# Patient Record
Sex: MALE | Race: WHITE | NOT HISPANIC OR LATINO | Employment: FULL TIME | URBAN - METROPOLITAN AREA
[De-identification: names, ages, dates, MRNs, and addresses within clinical notes are randomized per-mention and may not be internally consistent; named-entity substitution may affect disease eponyms.]

---

## 2019-08-23 ENCOUNTER — TRANSCRIBE ORDERS (OUTPATIENT)
Dept: ADMINISTRATIVE | Facility: HOSPITAL | Age: 39
End: 2019-08-23

## 2019-08-23 ENCOUNTER — APPOINTMENT (OUTPATIENT)
Dept: LAB | Facility: CLINIC | Age: 39
End: 2019-08-23

## 2019-08-23 DIAGNOSIS — Z00.8 HEALTH EXAMINATION IN POPULATION SURVEY: Primary | ICD-10-CM

## 2019-08-23 DIAGNOSIS — Z00.8 HEALTH EXAMINATION IN POPULATION SURVEY: ICD-10-CM

## 2019-08-23 LAB
CHOLEST SERPL-MCNC: 194 MG/DL (ref 50–200)
EST. AVERAGE GLUCOSE BLD GHB EST-MCNC: 120 MG/DL
HBA1C MFR BLD: 5.8 % (ref 4.2–6.3)
HDLC SERPL-MCNC: 41 MG/DL (ref 40–60)
LDLC SERPL CALC-MCNC: 107 MG/DL (ref 0–100)
NONHDLC SERPL-MCNC: 153 MG/DL
TRIGL SERPL-MCNC: 229 MG/DL

## 2019-08-23 PROCEDURE — 83036 HEMOGLOBIN GLYCOSYLATED A1C: CPT

## 2019-08-23 PROCEDURE — 80061 LIPID PANEL: CPT

## 2019-08-23 PROCEDURE — 36415 COLL VENOUS BLD VENIPUNCTURE: CPT

## 2020-06-17 ENCOUNTER — OFFICE VISIT (OUTPATIENT)
Dept: GASTROENTEROLOGY | Facility: MEDICAL CENTER | Age: 40
End: 2020-06-17
Payer: COMMERCIAL

## 2020-06-17 VITALS
HEART RATE: 80 BPM | DIASTOLIC BLOOD PRESSURE: 116 MMHG | SYSTOLIC BLOOD PRESSURE: 151 MMHG | HEIGHT: 74 IN | BODY MASS INDEX: 33.62 KG/M2 | WEIGHT: 262 LBS | TEMPERATURE: 97.9 F

## 2020-06-17 DIAGNOSIS — R13.19 ESOPHAGEAL DYSPHAGIA: ICD-10-CM

## 2020-06-17 DIAGNOSIS — Z11.59 SPECIAL SCREENING EXAMINATION FOR UNSPECIFIED VIRAL DISEASE: Primary | ICD-10-CM

## 2020-06-17 PROCEDURE — 99203 OFFICE O/P NEW LOW 30 MIN: CPT | Performed by: INTERNAL MEDICINE

## 2020-06-23 DIAGNOSIS — Z11.59 SPECIAL SCREENING EXAMINATION FOR UNSPECIFIED VIRAL DISEASE: ICD-10-CM

## 2020-06-23 PROCEDURE — U0003 INFECTIOUS AGENT DETECTION BY NUCLEIC ACID (DNA OR RNA); SEVERE ACUTE RESPIRATORY SYNDROME CORONAVIRUS 2 (SARS-COV-2) (CORONAVIRUS DISEASE [COVID-19]), AMPLIFIED PROBE TECHNIQUE, MAKING USE OF HIGH THROUGHPUT TECHNOLOGIES AS DESCRIBED BY CMS-2020-01-R: HCPCS

## 2020-06-24 LAB — SARS-COV-2 RNA SPEC QL NAA+PROBE: NOT DETECTED

## 2020-06-26 ENCOUNTER — OFFICE VISIT (OUTPATIENT)
Dept: FAMILY MEDICINE CLINIC | Facility: CLINIC | Age: 40
End: 2020-06-26
Payer: COMMERCIAL

## 2020-06-26 VITALS
SYSTOLIC BLOOD PRESSURE: 132 MMHG | BODY MASS INDEX: 35.08 KG/M2 | RESPIRATION RATE: 17 BRPM | DIASTOLIC BLOOD PRESSURE: 90 MMHG | OXYGEN SATURATION: 97 % | WEIGHT: 259 LBS | TEMPERATURE: 97.4 F | HEIGHT: 72 IN | HEART RATE: 94 BPM

## 2020-06-26 DIAGNOSIS — R13.19 ESOPHAGEAL DYSPHAGIA: ICD-10-CM

## 2020-06-26 DIAGNOSIS — E66.9 OBESITY (BMI 30-39.9): ICD-10-CM

## 2020-06-26 DIAGNOSIS — E78.1 HYPERTRIGLYCERIDEMIA: ICD-10-CM

## 2020-06-26 DIAGNOSIS — Z00.00 HEALTH CARE MAINTENANCE: ICD-10-CM

## 2020-06-26 DIAGNOSIS — R73.03 PREDIABETES: Primary | ICD-10-CM

## 2020-06-26 PROCEDURE — 99395 PREV VISIT EST AGE 18-39: CPT | Performed by: FAMILY MEDICINE

## 2020-06-26 PROCEDURE — 3008F BODY MASS INDEX DOCD: CPT | Performed by: INTERNAL MEDICINE

## 2020-06-29 ENCOUNTER — ANESTHESIA EVENT (OUTPATIENT)
Dept: GASTROENTEROLOGY | Facility: MEDICAL CENTER | Age: 40
End: 2020-06-29

## 2020-06-29 RX ORDER — ONDANSETRON 2 MG/ML
4 INJECTION INTRAMUSCULAR; INTRAVENOUS EVERY 6 HOURS PRN
Status: CANCELLED | OUTPATIENT
Start: 2020-06-29

## 2020-06-30 ENCOUNTER — HOSPITAL ENCOUNTER (OUTPATIENT)
Dept: GASTROENTEROLOGY | Facility: MEDICAL CENTER | Age: 40
Setting detail: OUTPATIENT SURGERY
Discharge: HOME | End: 2020-06-30
Attending: INTERNAL MEDICINE | Admitting: INTERNAL MEDICINE
Payer: COMMERCIAL

## 2020-06-30 ENCOUNTER — ANESTHESIA (OUTPATIENT)
Dept: GASTROENTEROLOGY | Facility: MEDICAL CENTER | Age: 40
End: 2020-06-30

## 2020-06-30 VITALS
TEMPERATURE: 97.7 F | HEART RATE: 82 BPM | RESPIRATION RATE: 15 BRPM | OXYGEN SATURATION: 96 % | WEIGHT: 255 LBS | HEIGHT: 72 IN | SYSTOLIC BLOOD PRESSURE: 143 MMHG | DIASTOLIC BLOOD PRESSURE: 96 MMHG | BODY MASS INDEX: 34.54 KG/M2

## 2020-06-30 DIAGNOSIS — R13.19 ESOPHAGEAL DYSPHAGIA: ICD-10-CM

## 2020-06-30 PROCEDURE — C1726 CATH, BAL DIL, NON-VASCULAR: HCPCS

## 2020-06-30 PROCEDURE — 88305 TISSUE EXAM BY PATHOLOGIST: CPT | Performed by: PATHOLOGY

## 2020-06-30 PROCEDURE — 43239 EGD BIOPSY SINGLE/MULTIPLE: CPT | Performed by: INTERNAL MEDICINE

## 2020-06-30 PROCEDURE — 43249 ESOPH EGD DILATION <30 MM: CPT | Performed by: INTERNAL MEDICINE

## 2020-06-30 RX ORDER — SODIUM CHLORIDE 9 MG/ML
125 INJECTION, SOLUTION INTRAVENOUS CONTINUOUS
Status: CANCELLED | OUTPATIENT
Start: 2020-06-30

## 2020-06-30 RX ORDER — LIDOCAINE HYDROCHLORIDE 20 MG/ML
INJECTION, SOLUTION EPIDURAL; INFILTRATION; INTRACAUDAL; PERINEURAL AS NEEDED
Status: DISCONTINUED | OUTPATIENT
Start: 2020-06-30 | End: 2020-06-30 | Stop reason: SURG

## 2020-06-30 RX ORDER — SODIUM CHLORIDE 9 MG/ML
125 INJECTION, SOLUTION INTRAVENOUS CONTINUOUS
Status: DISCONTINUED | OUTPATIENT
Start: 2020-06-30 | End: 2020-06-30

## 2020-06-30 RX ORDER — PROPOFOL 10 MG/ML
INJECTION, EMULSION INTRAVENOUS AS NEEDED
Status: DISCONTINUED | OUTPATIENT
Start: 2020-06-30 | End: 2020-06-30 | Stop reason: SURG

## 2020-06-30 RX ORDER — PROPOFOL 10 MG/ML
INJECTION, EMULSION INTRAVENOUS CONTINUOUS PRN
Status: DISCONTINUED | OUTPATIENT
Start: 2020-06-30 | End: 2020-06-30 | Stop reason: SURG

## 2020-06-30 RX ADMIN — PROPOFOL 150 MG: 10 INJECTION, EMULSION INTRAVENOUS at 07:23

## 2020-06-30 RX ADMIN — SODIUM CHLORIDE 125 ML/HR: 0.9 INJECTION, SOLUTION INTRAVENOUS at 07:04

## 2020-06-30 RX ADMIN — PROPOFOL 180 MCG/KG/MIN: 10 INJECTION, EMULSION INTRAVENOUS at 07:23

## 2020-06-30 RX ADMIN — LIDOCAINE HYDROCHLORIDE 5 ML: 20 INJECTION, SOLUTION EPIDURAL; INFILTRATION; INTRACAUDAL at 07:23

## 2020-06-30 NOTE — DISCHARGE INSTRUCTIONS
Upper Endoscopy   WHAT YOU NEED TO KNOW:   An upper endoscopy is also called an upper gastrointestinal (GI) endoscopy, or an esophagogastroduodenoscopy (EGD)  You may feel bloated, gassy, or have some abdominal discomfort after your procedure  Your throat may be sore for 24 to 36 hours  You may burp or pass gas from air that is still inside your body  DISCHARGE INSTRUCTIONS:   Call 911 if:   · You have sudden chest pain or trouble breathing  Seek care immediately if:   · You feel dizzy or faint  · You have trouble swallowing  · You have severe throat pain  · Your bowel movements are very dark or black  · Your abdomen is hard and firm and you have severe pain  · You vomit blood  Contact your healthcare provider if:   · You feel full or bloated and cannot burp or pass gas  · You have not had a bowel movement for 3 days after your procedure  · You have neck pain  · You have a fever or chills  · You have nausea or are vomiting  · You have a rash or hives  · You have questions or concerns about your endoscopy  Relieve a sore throat:  Suck on throat lozenges or crushed ice  Gargle with a small amount of warm salt water  Mix 1 teaspoon of salt and 1 cup of warm water to make salt water  Relieve gas and discomfort from bloating:  Lie on your right side with a heating pad on your abdomen  Take short walks to help pass gas  Eat small meals until bloating is relieved  Rest after your procedure:  Do not drive or make important decisions until the day after your procedure  Return to your normal activity as directed  You can usually return to work the day after your procedure  Follow up with your healthcare provider as directed:  Write down your questions so you remember to ask them during your visits  © 2017 [employer]  Information is for End User's use only and may not be sold, redistributed or otherwise used for commercial purposes   All illustrations and images included in nodila 605 are the copyrighted property of [employer]  or Mike Lancaster  The above information is an  only  It is not intended as medical advice for individual conditions or treatments  Talk to your doctor, nurse or pharmacist before following any medical regimen to see if it is safe and effective for you  Esophageal Dilation   WHAT YOU NEED TO KNOW:   Esophageal dilation is a procedure to widen a narrow part of your esophagus  Your healthcare provider will use a dilator (inflatable balloon or another tool that expands) to make the area wider  He may also do an endoscopy before or during your esophageal dilation  During an endoscopy, your healthcare provider will use a scope to see inside your esophagus  DISCHARGE INSTRUCTIONS:   Medicines:   · Medicines  may be given to decrease stomach acid that can irritate your esophagus  · Take your medicine as directed  Contact your healthcare provider if you think your medicine is not helping or if you have side effects  Tell him if you are allergic to any medicine  Keep a list of the medicines, vitamins, and herbs you take  Include the amounts, and when and why you take them  Bring the list or the pill bottles to follow-up visits  Carry your medicine list with you in case of an emergency  Follow up with your healthcare provider as directed:  Write down your questions so you remember to ask them during your visits  Nutrition:  You may eat foods you normally eat  Chew your food well  Eat soft foods if you still have problems swallowing  Soft foods include applesauce, bananas, cooked cereal, cottage cheese, eggs, pudding, and yogurt  Ask for more information on what types of food to eat  Contact your healthcare provider if:   · You have a fever  · You feel very full or bloated  · You have more problems swallowing food  · You have nausea or are vomiting      · You have questions or concerns about your condition or care   Seek care immediately or call 911 if:   · You vomit blood  · You are not able to swallow any food  · You have a fast heartbeat, chest pain, or sudden trouble breathing  · Your abdomen suddenly becomes tender and hard  © 2017 [address] [employer] Information is for End User's use only and may not be sold, redistributed or otherwise used for commercial purposes  All illustrations and images included in CareNotes® are the copyrighted property of [employer] , [facility]  or Mike Lancaster  The above information is an  only  It is not intended as medical advice for individual conditions or treatments  Talk to your doctor, nurse or pharmacist before following any medical regimen to see if it is safe and effective for you

## 2020-07-07 DIAGNOSIS — K20.90 ESOPHAGITIS: Primary | ICD-10-CM

## 2020-07-07 RX ORDER — OMEPRAZOLE 20 MG/1
20 CAPSULE, DELAYED RELEASE ORAL AS NEEDED
Qty: 90 CAPSULE | Refills: 1 | Status: SHIPPED | OUTPATIENT
Start: 2020-07-07 | End: 2021-01-02

## 2020-07-07 RX ORDER — FAMOTIDINE 40 MG/1
40 TABLET, FILM COATED ORAL DAILY
Qty: 90 TABLET | Refills: 2 | Status: SHIPPED | OUTPATIENT
Start: 2020-07-07 | End: 2021-04-04

## 2021-01-02 DIAGNOSIS — K20.90 ESOPHAGITIS: ICD-10-CM

## 2021-01-02 RX ORDER — OMEPRAZOLE 20 MG/1
CAPSULE, DELAYED RELEASE ORAL
Qty: 90 CAPSULE | Refills: 9 | Status: SHIPPED | OUTPATIENT
Start: 2021-01-02 | End: 2021-03-01

## 2021-01-28 DIAGNOSIS — Z23 ENCOUNTER FOR IMMUNIZATION: ICD-10-CM

## 2021-02-22 ENCOUNTER — OFFICE VISIT (OUTPATIENT)
Dept: FAMILY MEDICINE CLINIC | Facility: CLINIC | Age: 41
End: 2021-02-22
Payer: COMMERCIAL

## 2021-02-22 ENCOUNTER — TELEPHONE (OUTPATIENT)
Dept: FAMILY MEDICINE CLINIC | Facility: CLINIC | Age: 41
End: 2021-02-22

## 2021-02-22 ENCOUNTER — OFFICE VISIT (OUTPATIENT)
Dept: OBGYN CLINIC | Facility: CLINIC | Age: 41
End: 2021-02-22

## 2021-02-22 ENCOUNTER — HOSPITAL ENCOUNTER (OUTPATIENT)
Dept: RADIOLOGY | Facility: HOSPITAL | Age: 41
Discharge: HOME | End: 2021-02-22
Payer: COMMERCIAL

## 2021-02-22 ENCOUNTER — HOSPITAL ENCOUNTER (OUTPATIENT)
Dept: CT IMAGING | Facility: HOSPITAL | Age: 41
Discharge: HOME | End: 2021-02-22
Payer: COMMERCIAL

## 2021-02-22 VITALS
SYSTOLIC BLOOD PRESSURE: 156 MMHG | BODY MASS INDEX: 34.4 KG/M2 | HEART RATE: 107 BPM | WEIGHT: 254 LBS | OXYGEN SATURATION: 99 % | HEIGHT: 72 IN | DIASTOLIC BLOOD PRESSURE: 104 MMHG | TEMPERATURE: 96.9 F | RESPIRATION RATE: 16 BRPM

## 2021-02-22 VITALS
HEIGHT: 72 IN | WEIGHT: 254 LBS | SYSTOLIC BLOOD PRESSURE: 143 MMHG | DIASTOLIC BLOOD PRESSURE: 82 MMHG | BODY MASS INDEX: 34.4 KG/M2

## 2021-02-22 DIAGNOSIS — M54.2 NECK PAIN: ICD-10-CM

## 2021-02-22 DIAGNOSIS — S06.0X9A CONCUSSION WITH LOSS OF CONSCIOUSNESS, INITIAL ENCOUNTER: ICD-10-CM

## 2021-02-22 DIAGNOSIS — M25.562 ACUTE PAIN OF LEFT KNEE: ICD-10-CM

## 2021-02-22 DIAGNOSIS — M25.462 EFFUSION OF LEFT KNEE: ICD-10-CM

## 2021-02-22 DIAGNOSIS — R07.81 RIB PAIN ON LEFT SIDE: Primary | ICD-10-CM

## 2021-02-22 DIAGNOSIS — R07.81 RIB PAIN ON LEFT SIDE: ICD-10-CM

## 2021-02-22 DIAGNOSIS — S89.92XA INJURY OF LEFT KNEE, INITIAL ENCOUNTER: Primary | ICD-10-CM

## 2021-02-22 DIAGNOSIS — S06.0X0A CONCUSSION WITHOUT LOSS OF CONSCIOUSNESS, INITIAL ENCOUNTER: ICD-10-CM

## 2021-02-22 PROCEDURE — 73564 X-RAY EXAM KNEE 4 OR MORE: CPT

## 2021-02-22 PROCEDURE — G1004 CDSM NDSC: HCPCS

## 2021-02-22 PROCEDURE — 70450 CT HEAD/BRAIN W/O DYE: CPT

## 2021-02-22 PROCEDURE — 99214 OFFICE O/P EST MOD 30 MIN: CPT | Performed by: FAMILY MEDICINE

## 2021-02-22 PROCEDURE — 72040 X-RAY EXAM NECK SPINE 2-3 VW: CPT

## 2021-02-22 PROCEDURE — 99204 OFFICE O/P NEW MOD 45 MIN: CPT | Performed by: FAMILY MEDICINE

## 2021-02-22 PROCEDURE — 71101 X-RAY EXAM UNILAT RIBS/CHEST: CPT

## 2021-02-22 NOTE — PATIENT INSTRUCTIONS
Here for s/p skiing accident and will need xrays and also consult with orthopedics for left knee pain and injury to left chest/ribs and neck pain  Rec STAT CT brain s/p skiing accident and LOC and concussion likely  May use Tylenol or advil prn pain with food  Call if worse  Rest and do not do anything strenuous   Concussion protocol, rec recheck in 1 week for f-up

## 2021-02-22 NOTE — TELEPHONE ENCOUNTER
Rib x-rays in chart for review with immediate findings, left 4th posterolateral slightly displaced rib fracture

## 2021-02-22 NOTE — PROGRESS NOTES
Assessment/Plan:  Chief Complaint   Patient presents with    Rib Injury     Pt c/o L/Rib pain x1day due to recent ski trip  Pt states he does have troubling taking deep breaths   Knee Pain     Pt /Lknee pain x1 day     Patient Instructions   Here for s/p skiing accident and will need xrays and also consult with orthopedics for left knee pain and injury to left chest/ribs and neck pain  Rec STAT CT brain s/p skiing accident and LOC and concussion likely  May use Tylenol or advil prn pain with food  Call if worse  Rest and do not do anything strenuous   Concussion protocol, rec recheck in 1 week for f-up  No problem-specific Assessment & Plan notes found for this encounter  Diagnoses and all orders for this visit:    Rib pain on left side  -     XR ribs left w pa chest min 3 views; Future  -     Ambulatory referral to Orthopedic Surgery; Future    Acute pain of left knee  -     XR knee 4+ vw left injury; Future  -     Ambulatory referral to Orthopedic Surgery; Future    Neck pain  -     XR spine cervical 2 or 3 vw injury; Future  -     Ambulatory referral to Orthopedic Surgery; Future    Concussion with loss of consciousness, initial encounter  -     CT head wo contrast; Future          Subjective:      Patient ID: Preston Espinoza is a 37 y o  male  Rib Injury (Pt c/o L/Rib pain x1day due to recent ski trip  Pt states he does have troubling taking deep breaths  )  Knee Pain (Pt /Lknee pain x1 day)    Skiing accident with left knee pain and heard something cracking left knee and landed on left side and left upper ribs to back and neck area and left cheek area  Patient can walk but certain movements left knee gives out  Patient not sure if he Lost consciousness  Wearing a helmet  Did not go to hospital  No headaches  Denies abdominal pain and urinating without problems         The following portions of the patient's history were reviewed and updated as appropriate: allergies, current medications, past family history, past medical history, past social history, past surgical history and problem list     Review of Systems   Constitutional: Negative  HENT: Negative  Eyes: Negative  Respiratory: Negative  Cardiovascular: Negative  Gastrointestinal: Negative  Endocrine: Negative  Genitourinary: Negative  Musculoskeletal:        Left knee pain and left rib pain and also cervical neck pain and left cheek pain  Skin: Negative  Allergic/Immunologic: Negative  Neurological: Negative  Hematological: Negative  Psychiatric/Behavioral: Negative  Objective:      BP (!) 156/104 (BP Location: Left arm, Patient Position: Sitting, Cuff Size: Large)   Pulse (!) 107   Temp (!) 96 9 °F (36 1 °C) (Temporal)   Resp 16   Ht 6' (1 829 m)   Wt 115 kg (254 lb)   SpO2 99%   BMI 34 45 kg/m²          Physical Exam  Constitutional:       Appearance: He is well-developed  HENT:      Head: Normocephalic and atraumatic  Right Ear: External ear normal       Left Ear: External ear normal       Nose: Nose normal    Eyes:      Conjunctiva/sclera: Conjunctivae normal       Pupils: Pupils are equal, round, and reactive to light  Neck:      Musculoskeletal: Normal range of motion and neck supple  Cardiovascular:      Rate and Rhythm: Normal rate and regular rhythm  Heart sounds: Normal heart sounds  Pulmonary:      Effort: Pulmonary effort is normal       Breath sounds: Normal breath sounds  Musculoskeletal:         General: Tenderness (neck and left rib area of chest as well as left knee pain when walking and pivoting with weaknessand worse with flexion  ) present  Skin:     General: Skin is warm and dry  Neurological:      General: No focal deficit present  Mental Status: He is alert and oriented to person, place, and time  Deep Tendon Reflexes: Reflexes are normal and symmetric     Psychiatric:         Behavior: Behavior normal

## 2021-02-22 NOTE — PROGRESS NOTES
1  Injury of left knee, initial encounter  Ambulatory referral to Orthopedic Surgery    MRI knee left  wo contrast   2  Rib pain on left side  Ambulatory referral to Orthopedic Surgery    Respiratory Therapy Supplies (Spirometer) KIT   3  Neck pain  Ambulatory referral to Orthopedic Surgery   4  Concussion without loss of consciousness, initial encounter     5  Effusion of left knee  MRI knee left  wo contrast    Brace     Orders Placed This Encounter   Procedures    Brace    MRI knee left  wo contrast        Imaging Studies (I personally reviewed images in PACS and report):  X-ray left knee 02/22/2021: No acute osseous abnormality   x-ray cervical vertebra 02/22/2021:  No acute abnormality  X-ray left rib series 02/22/2021:  No acute abnormality   CT head scan 02/22/2021: No acute intracranial abnormality per report    IMPRESSION:  Left knee injury-likely lateral meniscal tear   Left-sided rib contusion versus occult fracture   Concussion      Repeat X-ray next visit:   none      Return for follow-up after mri  Patient Instructions   Explained the patient has had injury appears to be a mild concussion  Does have nystagmus on his examination today but balance is normal   Explained that I would recommend physical therapy only if he does not significantly improve and next few weeks  I have asked him to avoid high risk activity including skiing until his concussion complete resolved  For patient's left rib pain explained to him that he has either an occult rib fracture or contusion  Explained that on his x-ray reviewed today in office he has no evidence of significantly displaced rib fracture  Therefore recommended conservative treatment to include Tylenol as needed as well as anti-inflammatories  I also prescribed him an incentive spirometer to help him clear his lungs to prevent development of pneumonia      For patient's left knee injury I explained that he has pain lateral aspect of his knee with instability concerning for possible lateral meniscal tear that is obstructive  As such I have ordered MRI of the left knee  I have also asked patient to wear hinged knee brace and to avoid weight-bearing on his left knee as much as possible  Educated risks of mixing NSAIDS ( (non-steroidal anti-inflammatory pills including advil, ibuprofen, motrin, meloxicam, celecoxib, aleve, naproxen, and aspirin containing products) with each other or with steroids (such as prednisone, medrol)  Explained risks of mixing these medications including stomach ulcer, severe internal bleeding, and kidney failure  Instructed not to take NSAIDS if have history of stomach ulcers, kidney issues, or uncontrolled hypertension  Instructed patient to use only one brand as prescribed  For naproxen, a maximum of 500 mg per dose every 12 hours and no more than two doses or 1,000mg per day  For Ibuprofen, a maximum of 800 mg per dose every 6 hours but no more than 3 doses or 2,400 mg per day  Never take these medications together  Never take these medications the same day  For severe pain and only if you have no liver problems, you may add Tylenol (also known as acetaminophen) maximum of 1,000  Mg per dose every 6 hours but no more 3 doses or 3,000 mg per day  Patient expressed understanding and agreed to plan  CHIEF COMPLAINT:  Head injury, rib injury, knee pain    HPI:  Sallye Heimlich is a 37 y o  male  who presents for       Visit 2/22/2021 :  Patient's today for evaluation of multiple injuries sustained after skiing accident occurred yesterday approximately 11:00 a m  In the morning  Patient tells me that while skiing he fell a crackling sensation his left knee and adjusted his stance resulting in patient falling tumbling down the mountain  He believes he may have lost consciousness briefly but this was unwitnessed    Patient today if he had some memory issues and that persisted even after having down the mountain  Today, patient tells me that his greatest source of pain is his left mid ribs  He has difficulty taking a deep breath due to pain as well as laughing  He is not short of breath otherwise  He has no chest pain otherwise  His pain does radiate towards his back around his rib cage  Sharp pain similar to patient's previous boxing injury when he was younger  For patient's head injury he tells me that his memory did return to normal within approximately 5-10 minutes  Today, he has no memory issues  He tells me he does have some anterior neck stiffness and tightness today but denies any posterior neck pain  Denies any pain radiating from his neck down his arms into his hands  Denies any numbness or tingling of the hands  He also when showed concussion she denies any concussion symptoms today including no headaches  For patient's left knee, he points to lateral aspect of his knee as source of pain  Pain exacerbated by flexion  Denies any pain at rest   Describes as dull deep-seated pain  Continues to complain of instability his left knee with recurrent episode of subjective instability today  Patient tells me that when he noticed this crepitus in his left knee he could feel threads whole entire body and here it in his ears  He was seen in the primary care office today by his doctor and had CT scan ordered of the head as well as rib x-ray in cervical spine x-ray  Review of Systems   Constitutional: Negative for chills, fatigue, fever and unexpected weight change  HENT: Negative for ear pain, hearing loss, nosebleeds and sore throat  Eyes: Negative for photophobia, pain, redness and visual disturbance  Respiratory: Negative for cough, shortness of breath and wheezing  Cardiovascular: Negative for chest pain, palpitations and leg swelling  Gastrointestinal: Negative for abdominal distention, nausea and vomiting  Endocrine: Negative for polydipsia and polyuria  Genitourinary: Negative for dysuria and hematuria  Musculoskeletal: Negative for neck pain  Skin: Negative for rash and wound  Neurological: Negative for dizziness, numbness and headaches  Psychiatric/Behavioral: Negative for behavioral problems, confusion, decreased concentration, sleep disturbance and suicidal ideas  The patient is not nervous/anxious  Following history reviewed and update:    History reviewed  No pertinent past medical history  Past Surgical History:   Procedure Laterality Date    TIBIA FRACTURE SURGERY       Social History   Social History     Substance and Sexual Activity   Alcohol Use Yes    Frequency: Monthly or less    Comment: socially     Social History     Substance and Sexual Activity   Drug Use Never     Social History     Tobacco Use   Smoking Status Never Smoker   Smokeless Tobacco Never Used     History reviewed  No pertinent family history  No Known Allergies       Physical Exam  /82   Ht 6' (1 829 m)   Wt 115 kg (254 lb)   BMI 34 45 kg/m²     Constitutional:  see vital signs  Gen: well-developed, normocephalic/atraumatic, well-groomed  Eyes: No inflammation or discharge of conjunctiva or lids; sclera clear   Pharynx: no inflammation, lesion, or mass of lips  Neck: supple, no masses, non-distended  MSK: no inflammation, lesion, mass, or clubbing of nails and digits except for other than mentioned below  SKIN: no visible rashes or skin lesions  Pulmonary/Chest: Effort normal  No respiratory distress     NEURO: cranial nerves grossly intact  PSYCH:  Alert and oriented to person, place, and time; recent and remote memory intact; mood normal, no depression, anxiety, or agitation, judgment and insight good and intact     Ortho Exam    Zepeda Sign: none  Raccoon Eyes: none  Ear Drainage: none  Nose Drainage: none  PERRL  EOMI:  Normal without pain  Smooth Pursuits: +left horizontal nystagmus  Two finger Point Saccades (two finger points eye movement): no worsening  One finger Focus with Head Rotation:  No worsening  Near-Point Convergence (<10cm): normal   No doubling  No convergence insufficiency    Unilateral Monocular Accomodation (<12cm): normal  Finger to nose: Normal  No Dysdiadokinesia  Single Leg Stance Eyes Open: normal  Single Leg Stance Eyes Closed: normal  Tandem Gait Eyes Open: normal  Tandem Gait Eyes Closed: normal     Cervical  ROM: intact  Midline spinous process tenderness: None  Muscular Tenderness: None  Sensation UE Bilateral:  C5: normal  C6: normal  C7: normal  C8: normal  T1: normal  Strength UE: 5/5 elbow, wrist, fingers bilateral    LEFT KNEE:  Erythema: no  Swelling: +3  Increased Warmth: no  Tenderness: none  Flexion: intact  Extension: intact  Lachman's: negative  Drawer: negative  Varus laxity: negative  Valgus laxity: negative  [facility]: +latearl    CHEST WALL EXAM:  +tenderness anterior and mid axillary line T10-12  No crepitus    Procedures

## 2021-02-22 NOTE — PATIENT INSTRUCTIONS
Explained the patient has had injury appears to be a mild concussion  Does have nystagmus on his examination today but balance is normal   Explained that I would recommend physical therapy only if he does not significantly improve and next few weeks  I have asked him to avoid high risk activity including skiing until his concussion complete resolved  For patient's left rib pain explained to him that he has either an occult rib fracture or contusion  Explained that on his x-ray reviewed today in office he has no evidence of significantly displaced rib fracture  Therefore recommended conservative treatment to include Tylenol as needed as well as anti-inflammatories  I also prescribed him an incentive spirometer to help him clear his lungs to prevent development of pneumonia  For patient's left knee injury I explained that he has pain lateral aspect of his knee with instability concerning for possible lateral meniscal tear that is obstructive  As such I have ordered MRI of the left knee  I have also asked patient to wear hinged knee brace and to avoid weight-bearing on his left knee as much as possible  Educated risks of mixing NSAIDS ( (non-steroidal anti-inflammatory pills including advil, ibuprofen, motrin, meloxicam, celecoxib, aleve, naproxen, and aspirin containing products) with each other or with steroids (such as prednisone, medrol)  Explained risks of mixing these medications including stomach ulcer, severe internal bleeding, and kidney failure  Instructed not to take NSAIDS if have history of stomach ulcers, kidney issues, or uncontrolled hypertension  Instructed patient to use only one brand as prescribed  For naproxen, a maximum of 500 mg per dose every 12 hours and no more than two doses or 1,000mg per day  For Ibuprofen, a maximum of 800 mg per dose every 6 hours but no more than 3 doses or 2,400 mg per day  Never take these medications together   Never take these medications the same day  For severe pain and only if you have no liver problems, you may add Tylenol (also known as acetaminophen) maximum of 1,000  Mg per dose every 6 hours but no more 3 doses or 3,000 mg per day  Patient expressed understanding and agreed to plan

## 2021-02-23 ENCOUNTER — TELEPHONE (OUTPATIENT)
Dept: FAMILY MEDICINE CLINIC | Facility: CLINIC | Age: 41
End: 2021-02-23

## 2021-02-23 DIAGNOSIS — M62.838 MUSCLE SPASM: Primary | ICD-10-CM

## 2021-02-23 RX ORDER — CYCLOBENZAPRINE HCL 10 MG
10 TABLET ORAL EVERY 12 HOURS PRN
Qty: 30 TABLET | Refills: 0 | Status: SHIPPED | OUTPATIENT
Start: 2021-02-23 | End: 2021-06-25

## 2021-02-23 NOTE — TELEPHONE ENCOUNTER
----- Message from Felisa Jacques DO sent at 2/23/2021  9:20 AM EST -----  Regarding: FW: Prescription Question  Contact: [phone]  Yes, I prescribed flexeril 10 mg every 12 hours prn muscle spasm, no driving if on muscle relaxant    ----- Message -----  From: Mary Huerta  Sent: 2/23/2021   7:16 AM EST  To: Felisa Jacques DO  Subject: FW: Prescription Question                          ----- Message -----  From: Michael Gates  Sent: 2/23/2021   7:01 AM EST  To: [facility]  Subject: Prescription Question                            Good Morning Dr Kirit Felton afternoon into the night my ribs were under constant spasm episodes for ~30mins at a time (shortness of breath/catching breath etc) with migration of spasm into the neck area  Would it be possible to Rx a muscle relaxer for a couple days  I'm scheduled for 7:45am next Monday as a follow up in your office    Thank you,  Amadeo Rodriges

## 2021-02-24 ENCOUNTER — HOSPITAL ENCOUNTER (OUTPATIENT)
Dept: RADIOLOGY | Facility: HOSPITAL | Age: 41
Discharge: HOME | End: 2021-02-24
Attending: FAMILY MEDICINE
Payer: COMMERCIAL

## 2021-02-24 ENCOUNTER — OFFICE VISIT (OUTPATIENT)
Dept: OBGYN CLINIC | Facility: OTHER | Age: 41
End: 2021-02-24
Payer: COMMERCIAL

## 2021-02-24 VITALS
WEIGHT: 254 LBS | HEART RATE: 103 BPM | HEIGHT: 72 IN | BODY MASS INDEX: 34.4 KG/M2 | SYSTOLIC BLOOD PRESSURE: 164 MMHG | DIASTOLIC BLOOD PRESSURE: 124 MMHG

## 2021-02-24 DIAGNOSIS — S89.92XA INJURY OF LEFT KNEE, INITIAL ENCOUNTER: ICD-10-CM

## 2021-02-24 DIAGNOSIS — M25.462 EFFUSION OF LEFT KNEE: ICD-10-CM

## 2021-02-24 DIAGNOSIS — S89.92XA INJURY OF LEFT KNEE, INITIAL ENCOUNTER: Primary | ICD-10-CM

## 2021-02-24 DIAGNOSIS — S83.512A RUPTURE OF ANTERIOR CRUCIATE LIGAMENT OF LEFT KNEE, INITIAL ENCOUNTER: ICD-10-CM

## 2021-02-24 PROCEDURE — 99214 OFFICE O/P EST MOD 30 MIN: CPT | Performed by: ORTHOPAEDIC SURGERY

## 2021-02-24 PROCEDURE — G1004 CDSM NDSC: HCPCS

## 2021-02-24 PROCEDURE — 73721 MRI JNT OF LWR EXTRE W/O DYE: CPT

## 2021-02-24 RX ORDER — CEFAZOLIN SODIUM 2 G/50ML
2000 SOLUTION INTRAVENOUS ONCE
Status: CANCELLED | OUTPATIENT
Start: 2021-03-08 | End: 2021-02-24

## 2021-02-24 RX ORDER — CHLORHEXIDINE GLUCONATE 0.12 MG/ML
15 RINSE ORAL ONCE
Status: CANCELLED | OUTPATIENT
Start: 2021-03-08 | End: 2021-02-24

## 2021-02-24 NOTE — PROGRESS NOTES
Orthopaedic Surgery - Office Note  Kareen Sneed (37 y o  male)   : 1980   MRN: [id number]  Encounter Date: 2021    Chief Complaint   Patient presents with    Left Knee - Pain       Assessment / Plan  L knee ACL tear    · After discussion of risk and benefits due to the patient's activity level he did agree to proceed with a Left knee arthroscopy with ACL reconstruction using patellar tendon autograft  Consent was signed in the office at this time and surgery will be scheduled in the near future  · Patient was provided with a postop knee brace which he will bring with him to surgery  · Patient was also provided with a PT script that should be scheduled to start 1 week postoperatively  He will receive his rehab protocol at his 1st postop visit  · Continue with ice and analgesics as needed at this time  He can continue activity as tolerated at this time and use his hinged knee brace as needed  Return for follow up 4-5 days postoperatively  History of Present Illness  Kareen Sneed is a 37 y o  male who presents for evaluation of his L knee which he injured on 2021 while skiing  His pain initially was felt this a cracking sensation in his knee which forced him to adjust his position causing him to fall and tum[place] the [place]  Originally there was concern for bilateral knee injury due to the pain being specific to that area of patient was sent for an MRI study that showed that he had left knee ACL tear  He currently denies much swelling or pain  He has been using at hinged knee brace since Monday which he feels gives him some support  He states he did have a instability event while getting into a car on Monday other than that though his knee has not felt unstable but he has been favoring it  Review of Systems  Pertinent items are noted in HPI  All other systems were reviewed and are negative      Physical Exam  BP (!) 164/124   Pulse 103   Ht 6' (1 829 m)   Wt 115 kg (254 lb)   BMI 34 45 kg/m²   Cons: Appears well  No apparent distress  Psych: Alert  Oriented x3  Mood and affect normal   Eyes: PERRLA, EOMI  Resp: Normal effort  No audible wheezing or stridor  CV: Palpable pulse  No discernable arrhythmia  No LE edema  Lymph:  No palpable cervical, axillary, or inguinal lymphadenopathy  Skin: Warm  No palpable masses  No visible lesions  Neuro: Normal muscle tone  Normal and symmetric DTR's  Left Knee Exam  Alignment:  Normal resting hip posture  Normal knee alignment  Inspection:    Mild left knee swelling with no notable bruising or deformity  Palpation:  No tenderness  Mild effusion  No warmth  ROM:  Knee Extension 0 degrees  Knee Flexion 120 dgrees  Strength:  5/5 quadriceps and hamstrings  Stability:  (+) Lachman (Grade 2)  (+) Pivot-shift (Grade 2)  Tests:  (-) Andrzej  Patella:  Patella tracks centrally with crepitus  Neurovascular:  Sensation intact in DP/SP/Conley/Sa/T nerve distributions  Sensation intact in all digital nerve distributions  Toes warm and perfused  Gait:  Antalgic  Studies Reviewed  I have personally reviewed pertinent films in PACS  MRI of left knee - From 02/24/2021 shows anterior cruciate ligament tear with pivot shift contusions and impaction fracture of the lateral femoral condyle  Meniscus are intact at this time  There is a small joint effusion noted  Procedures  No procedures today  Medical, Surgical, Family, and Social History  The patient's medical history, family history, and social history, were reviewed and updated as appropriate  History reviewed  No pertinent past medical history  Past Surgical History:   Procedure Laterality Date    TIBIA FRACTURE SURGERY         History reviewed  No pertinent family history  Social History     Occupational History    Not on file   Tobacco Use    Smoking status: Never Smoker    Smokeless tobacco: Never Used   Substance and Sexual Activity    Alcohol use:  Yes Frequency: Monthly or less     Comment: socially    Drug use: Never    Sexual activity: Yes     Partners: Female       No Known Allergies      Current Outpatient Medications:     omeprazole (PriLOSEC) 20 mg delayed release capsule, TAKE 1 CAPSULE BY MOUTH AS NEEDED , Disp: 90 capsule, Rfl: 9    cyclobenzaprine (FLEXERIL) 10 mg tablet, Take 1 tablet (10 mg total) by mouth every 12 (twelve) hours as needed for muscle spasms (Patient not taking: Reported on 2/24/2021), Disp: 30 tablet, Rfl: 0    famotidine (PEPCID) 40 MG tablet, Take 1 tablet (40 mg total) by mouth daily, Disp: 90 tablet, Rfl: 2    Respiratory Therapy Supplies (Spirometer) KIT, Use 4 (four) times a day Incentive Spirometry Use as directed, Disp: 1 kit, Rfl: 0      Social [employer]    Scribe Attestation    I,:   am acting as a scribe while in the presence of the attending physician :       I,:   personally performed the services described in this documentation    as scribed in my presence :

## 2021-02-24 NOTE — H&P (VIEW-ONLY)
Orthopaedic Surgery - Office Note  Lauren Santiago (37 y o  male)   : 1980   MRN: [id number]  Encounter Date: 2021    Chief Complaint   Patient presents with    Left Knee - Pain       Assessment / Plan  L knee ACL tear    · After discussion of risk and benefits due to the patient's activity level he did agree to proceed with a Left knee arthroscopy with ACL reconstruction using patellar tendon autograft  Consent was signed in the office at this time and surgery will be scheduled in the near future  · Patient was provided with a postop knee brace which he will bring with him to surgery  · Patient was also provided with a PT script that should be scheduled to start 1 week postoperatively  He will receive his rehab protocol at his 1st postop visit  · Continue with ice and analgesics as needed at this time  He can continue activity as tolerated at this time and use his hinged knee brace as needed  Return for follow up 4-5 days postoperatively  History of Present Illness  Lauren Santiago is a 37 y o  male who presents for evaluation of his L knee which he injured on 2021 while skiing  His pain initially was felt this a cracking sensation in his knee which forced him to adjust his position causing him to fall and tumbled [place] the [place]  Originally there was concern for bilateral knee injury due to the pain being specific to that area of patient was sent for an MRI study that showed that he had left knee ACL tear  He currently denies much swelling or pain  He has been using at hinged knee brace since Monday which he feels gives him some support  He states he did have a instability event while getting into a car on Monday other than that though his knee has not felt unstable but he has been favoring it  Review of Systems  Pertinent items are noted in HPI  All other systems were reviewed and are negative      Physical Exam  BP (!) 164/124   Pulse 103   Ht 6' (1 829 m)   Wt 115 kg (254 lb)   BMI 34 45 kg/m²   Cons: Appears well  No apparent distress  Psych: Alert  Oriented x3  Mood and affect normal   Eyes: PERRLA, EOMI  Resp: Normal effort  No audible wheezing or stridor  CV: Palpable pulse  No discernable arrhythmia  No LE edema  Lymph:  No palpable cervical, axillary, or inguinal lymphadenopathy  Skin: Warm  No palpable masses  No visible lesions  Neuro: Normal muscle tone  Normal and symmetric DTR's  Left Knee Exam  Alignment:  Normal resting hip posture  Normal knee alignment  Inspection:    Mild left knee swelling with no notable bruising or deformity  Palpation:  No tenderness  Mild effusion  No warmth  ROM:  Knee Extension 0 degrees  Knee Flexion 120 dgrees  Strength:  5/5 quadriceps and hamstrings  Stability:  (+) Lachman (Grade 2)  (+) Pivot-shift (Grade 2)  Tests:  (-) Andrzej  Patella:  Patella tracks centrally with crepitus  Neurovascular:  Sensation intact in DP/SP/Conley/Sa/T nerve distributions  Sensation intact in all digital nerve distributions  Toes warm and perfused  Gait:  Antalgic  Studies Reviewed  I have personally reviewed pertinent films in PACS  MRI of left knee - From 02/24/2021 shows anterior cruciate ligament tear with pivot shift contusions and impaction fracture of the lateral femoral condyle  Meniscus are intact at this time  There is a small joint effusion noted  Procedures  No procedures today  Medical, Surgical, Family, and Social History  The patient's medical history, family history, and social history, were reviewed and updated as appropriate  History reviewed  No pertinent past medical history  Past Surgical History:   Procedure Laterality Date    TIBIA FRACTURE SURGERY         History reviewed  No pertinent family history  Social History     Occupational History    Not on file   Tobacco Use    Smoking status: Never Smoker    Smokeless tobacco: Never Used   Substance and Sexual Activity    Alcohol use:  Yes Frequency: Monthly or less     Comment: socially    Drug use: Never    Sexual activity: Yes     Partners: Female       No Known Allergies      Current Outpatient Medications:     omeprazole (PriLOSEC) 20 mg delayed release capsule, TAKE 1 CAPSULE BY MOUTH AS NEEDED , Disp: 90 capsule, Rfl: 9    cyclobenzaprine (FLEXERIL) 10 mg tablet, Take 1 tablet (10 mg total) by mouth every 12 (twelve) hours as needed for muscle spasms (Patient not taking: Reported on 2/24/2021), Disp: 30 tablet, Rfl: 0    famotidine (PEPCID) 40 MG tablet, Take 1 tablet (40 mg total) by mouth daily, Disp: 90 tablet, Rfl: 2    Respiratory Therapy Supplies (Spirometer) KIT, Use 4 (four) times a day Incentive Spirometry Use as directed, Disp: 1 kit, Rfl: 0      Zan Fernando PA-C    Scribe Attestation    I,:   am acting as a scribe while in the presence of the attending physician :       I,:   personally performed the services described in this documentation    as scribed in my presence :

## 2021-03-01 ENCOUNTER — ANESTHESIA EVENT (OUTPATIENT)
Dept: PERIOP | Facility: HOSPITAL | Age: 41
End: 2021-03-01
Payer: COMMERCIAL

## 2021-03-01 ENCOUNTER — OFFICE VISIT (OUTPATIENT)
Dept: FAMILY MEDICINE CLINIC | Facility: CLINIC | Age: 41
End: 2021-03-01
Payer: COMMERCIAL

## 2021-03-01 VITALS
HEIGHT: 72 IN | TEMPERATURE: 97 F | DIASTOLIC BLOOD PRESSURE: 112 MMHG | BODY MASS INDEX: 35.76 KG/M2 | SYSTOLIC BLOOD PRESSURE: 170 MMHG | OXYGEN SATURATION: 98 % | HEART RATE: 96 BPM | WEIGHT: 264 LBS

## 2021-03-01 DIAGNOSIS — S06.0X9A CONCUSSION WITH LOSS OF CONSCIOUSNESS, INITIAL ENCOUNTER: Primary | ICD-10-CM

## 2021-03-01 DIAGNOSIS — R42 VERTIGO: ICD-10-CM

## 2021-03-01 DIAGNOSIS — Z23 ENCOUNTER FOR IMMUNIZATION: ICD-10-CM

## 2021-03-01 DIAGNOSIS — S83.512A RUPTURE OF ANTERIOR CRUCIATE LIGAMENT OF LEFT KNEE, INITIAL ENCOUNTER: ICD-10-CM

## 2021-03-01 DIAGNOSIS — Z11.4 SCREENING FOR HIV (HUMAN IMMUNODEFICIENCY VIRUS): ICD-10-CM

## 2021-03-01 PROCEDURE — 1036F TOBACCO NON-USER: CPT | Performed by: FAMILY MEDICINE

## 2021-03-01 PROCEDURE — 99214 OFFICE O/P EST MOD 30 MIN: CPT | Performed by: FAMILY MEDICINE

## 2021-03-01 RX ORDER — MULTIVIT-MIN/IRON/FOLIC ACID/K 18-600-40
1 CAPSULE ORAL DAILY
COMMUNITY

## 2021-03-01 RX ORDER — MULTIVITAMIN
1 TABLET ORAL DAILY
COMMUNITY

## 2021-03-01 RX ORDER — ZINC GLUCONATE 50 MG
1 TABLET ORAL DAILY
COMMUNITY
End: 2021-06-25

## 2021-03-01 RX ORDER — IBUPROFEN 600 MG/1
600 TABLET ORAL EVERY 6 HOURS PRN
COMMUNITY
End: 2021-03-22 | Stop reason: HOSPADM

## 2021-03-01 RX ORDER — ACETAMINOPHEN 325 MG/1
650 TABLET ORAL EVERY 6 HOURS PRN
COMMUNITY
End: 2021-06-25

## 2021-03-01 NOTE — PROGRESS NOTES
Assessment/Plan:  Chief Complaint   Patient presents with    Concussion     follow up     Chest Pain     rib pain 08/10     Patient Instructions   S/P ski accident here for concussion recheck and is stable, CT brain wnl, has left acl tear and left rib fracture  F-up with surgery for left acl tear and use tylenol or advil prn pain, take with food and call if any problems  If interested in meclizine prn dizziness, patient will call  No problem-specific Assessment & Plan notes found for this encounter  Diagnoses and all orders for this visit:    Concussion with loss of consciousness, initial encounter    Screening for HIV (human immunodeficiency virus)  -     HIV 1/2 Antigen/Antibody (4th Generation) w Reflex [facility]; Future    Encounter for immunization  -     TDAP VACCINE GREATER THAN OR EQUAL TO 8YO IM    Rupture of anterior cruciate ligament of left knee, initial encounter    Vertigo          Subjective:      Patient ID: Marylin Mcnulty is a 37 y o  male  Here for recheck and doing well, has some dizziness which is better after a few seconds, - under 20 seconds  Has a left ACL tear surgery scheduled 3/8/21  He has surgery scheduled at [facility]  Still with left rib pain  This pain is stable, has not used mm relaxant  Takes OTC tylenol or 600 mg advil prn pain  Chest Pain   Associated symptoms include dizziness (lasts less than 20 seconds after changing position)  The following portions of the patient's history were reviewed and updated as appropriate: allergies, current medications, past family history, past medical history, past social history, past surgical history and problem list     Review of Systems   Constitutional: Negative  HENT: Negative  Eyes: Negative  Respiratory: Negative  Cardiovascular: Positive for chest pain (left rib pain from fracture from skiing accident  )  Gastrointestinal: Negative  Endocrine: Negative  Genitourinary: Negative      Musculoskeletal: Left knee ACL tear  Skin: Negative  Allergic/Immunologic: Negative  Neurological: Positive for dizziness (lasts less than 20 seconds after changing position)  Hematological: Negative  Psychiatric/Behavioral: Negative  Objective:      BP (!) 170/112 (BP Location: Left arm, Patient Position: Sitting, Cuff Size: Adult)   Pulse 96   Temp (!) 97 °F (36 1 °C) (Temporal)   Ht 6' (1 829 m)   Wt 120 kg (264 lb)   SpO2 98%   BMI 35 80 kg/m²          Physical Exam  Constitutional:       Appearance: He is well-developed  HENT:      Head: Normocephalic and atraumatic  Right Ear: External ear normal       Left Ear: External ear normal       Nose: Nose normal    Eyes:      Conjunctiva/sclera: Conjunctivae normal       Pupils: Pupils are equal, round, and reactive to light  Neck:      Musculoskeletal: Normal range of motion and neck supple  Cardiovascular:      Rate and Rhythm: Normal rate and regular rhythm  Heart sounds: Normal heart sounds  Pulmonary:      Effort: Pulmonary effort is normal       Breath sounds: Normal breath sounds  Musculoskeletal:      Comments: Left knee ACL tear and left rib pain from fracture  Skin:     General: Skin is warm and dry  Neurological:      Mental Status: He is alert and oriented to person, place, and time  Deep Tendon Reflexes: Reflexes are normal and symmetric     Psychiatric:         Behavior: Behavior normal

## 2021-03-01 NOTE — PRE-PROCEDURE INSTRUCTIONS
Pre-Surgery Instructions:   Medication Instructions    acetaminophen (TYLENOL) 325 mg tablet Instructed patient per Anesthesia Guidelines   Ascorbic Acid (Vitamin C) 500 MG CAPS Instructed patient per Anesthesia Guidelines   cyclobenzaprine (FLEXERIL) 10 mg tablet Instructed patient per Anesthesia Guidelines   famotidine (PEPCID) 40 MG tablet Instructed patient per Anesthesia Guidelines   ibuprofen (MOTRIN) 600 mg tablet Instructed patient per Anesthesia Guidelines   Multiple Vitamin (multivitamin) tablet Instructed patient per Anesthesia Guidelines   Zinc 50 MG TABS Instructed patient per Anesthesia Guidelines  Instructed to take Famotidine and may take Tylenol with sip of water the morning of surgery  No NSAIDs 3 days before surgery  No aspirin , vitamins, or supplements 1 week before surgery

## 2021-03-01 NOTE — PATIENT INSTRUCTIONS
S/P ski accident here for concussion recheck and is stable, CT brain wnl, has left acl tear and left rib fracture  F-up with surgery for left acl tear and use tylenol or advil prn pain, take with food and call if any problems  If interested in meclizine prn dizziness, patient will call

## 2021-03-05 ENCOUNTER — TELEPHONE (OUTPATIENT)
Dept: FAMILY MEDICINE CLINIC | Facility: CLINIC | Age: 41
End: 2021-03-05

## 2021-03-05 NOTE — TELEPHONE ENCOUNTER
Patient came in today- he dropped off [place] paperwork regarding his concussion he saw you earlier in the week for  He is asking if you can fill this out  Please contact patient once done  Paperwork is in your bin  Thanks!

## 2021-03-08 ENCOUNTER — ANESTHESIA (OUTPATIENT)
Dept: PERIOP | Facility: HOSPITAL | Age: 41
End: 2021-03-08
Payer: COMMERCIAL

## 2021-03-08 ENCOUNTER — HOSPITAL ENCOUNTER (OUTPATIENT)
Facility: HOSPITAL | Age: 41
Setting detail: OUTPATIENT SURGERY
Discharge: HOME | End: 2021-03-08
Attending: ORTHOPAEDIC SURGERY | Admitting: ORTHOPAEDIC SURGERY
Payer: COMMERCIAL

## 2021-03-08 VITALS
HEART RATE: 87 BPM | HEIGHT: 72 IN | DIASTOLIC BLOOD PRESSURE: 72 MMHG | TEMPERATURE: 97.6 F | BODY MASS INDEX: 34.27 KG/M2 | OXYGEN SATURATION: 96 % | SYSTOLIC BLOOD PRESSURE: 120 MMHG | WEIGHT: 253 LBS | RESPIRATION RATE: 18 BRPM

## 2021-03-08 DIAGNOSIS — S83.512A RUPTURE OF ANTERIOR CRUCIATE LIGAMENT OF LEFT KNEE, INITIAL ENCOUNTER: Primary | ICD-10-CM

## 2021-03-08 PROCEDURE — C1713 ANCHOR/SCREW BN/BN,TIS/BN: HCPCS | Performed by: ORTHOPAEDIC SURGERY

## 2021-03-08 PROCEDURE — NC001 PR NO CHARGE: Performed by: PHYSICIAN ASSISTANT

## 2021-03-08 PROCEDURE — 29882 ARTHRS KNE SRG MNISC RPR M/L: CPT | Performed by: ORTHOPAEDIC SURGERY

## 2021-03-08 PROCEDURE — 29888 ARTHRS AID ACL RPR/AGMNTJ: CPT | Performed by: ORTHOPAEDIC SURGERY

## 2021-03-08 DEVICE — Ø8X 30MM BC IF SCRW, VENTED
Type: IMPLANTABLE DEVICE | Site: KNEE | Status: FUNCTIONAL
Brand: ARTHREX®

## 2021-03-08 DEVICE — FIBERSTITCH IMPLANT CRV: Type: IMPLANTABLE DEVICE | Site: KNEE | Status: FUNCTIONAL

## 2021-03-08 DEVICE — GRAFT BONE CANCELLOUS CHIPS 15ML: Type: IMPLANTABLE DEVICE | Site: KNEE | Status: FUNCTIONAL

## 2021-03-08 DEVICE — Ø8X 20MM BC IF SCRW, VENTED
Type: IMPLANTABLE DEVICE | Site: KNEE | Status: FUNCTIONAL
Brand: ARTHREX®

## 2021-03-08 RX ORDER — FENTANYL CITRATE 50 UG/ML
INJECTION, SOLUTION INTRAMUSCULAR; INTRAVENOUS AS NEEDED
Status: DISCONTINUED | OUTPATIENT
Start: 2021-03-08 | End: 2021-03-08

## 2021-03-08 RX ORDER — CHLORHEXIDINE GLUCONATE 0.12 MG/ML
15 RINSE ORAL ONCE
Status: COMPLETED | OUTPATIENT
Start: 2021-03-08 | End: 2021-03-08

## 2021-03-08 RX ORDER — OXYCODONE HYDROCHLORIDE 5 MG/1
10 TABLET ORAL EVERY 4 HOURS PRN
Status: DISCONTINUED | OUTPATIENT
Start: 2021-03-08 | End: 2021-03-22 | Stop reason: HOSPADM

## 2021-03-08 RX ORDER — OXYCODONE HYDROCHLORIDE 5 MG/1
5 TABLET ORAL EVERY 4 HOURS PRN
Status: DISCONTINUED | OUTPATIENT
Start: 2021-03-08 | End: 2021-03-22 | Stop reason: HOSPADM

## 2021-03-08 RX ORDER — NAPROXEN 500 MG/1
500 TABLET ORAL 2 TIMES DAILY WITH MEALS
Qty: 60 TABLET | Refills: 0 | Status: SHIPPED | OUTPATIENT
Start: 2021-03-08 | End: 2021-07-23

## 2021-03-08 RX ORDER — DEXAMETHASONE SODIUM PHOSPHATE 4 MG/ML
INJECTION, SOLUTION INTRA-ARTICULAR; INTRALESIONAL; INTRAMUSCULAR; INTRAVENOUS; SOFT TISSUE AS NEEDED
Status: DISCONTINUED | OUTPATIENT
Start: 2021-03-08 | End: 2021-03-08

## 2021-03-08 RX ORDER — ONDANSETRON 2 MG/ML
4 INJECTION INTRAMUSCULAR; INTRAVENOUS ONCE AS NEEDED
Status: DISCONTINUED | OUTPATIENT
Start: 2021-03-08 | End: 2021-03-08 | Stop reason: HOSPADM

## 2021-03-08 RX ORDER — SODIUM CHLORIDE 9 MG/ML
125 INJECTION, SOLUTION INTRAVENOUS CONTINUOUS
Status: DISCONTINUED | OUTPATIENT
Start: 2021-03-08 | End: 2021-03-22 | Stop reason: HOSPADM

## 2021-03-08 RX ORDER — OXYCODONE HYDROCHLORIDE 5 MG/1
5 TABLET ORAL EVERY 4 HOURS PRN
Qty: 45 TABLET | Refills: 0 | Status: SHIPPED | OUTPATIENT
Start: 2021-03-08 | End: 2021-03-18

## 2021-03-08 RX ORDER — LIDOCAINE HYDROCHLORIDE 10 MG/ML
INJECTION, SOLUTION EPIDURAL; INFILTRATION; INTRACAUDAL; PERINEURAL AS NEEDED
Status: DISCONTINUED | OUTPATIENT
Start: 2021-03-08 | End: 2021-03-08

## 2021-03-08 RX ORDER — PROPOFOL 10 MG/ML
INJECTION, EMULSION INTRAVENOUS AS NEEDED
Status: DISCONTINUED | OUTPATIENT
Start: 2021-03-08 | End: 2021-03-08

## 2021-03-08 RX ORDER — MIDAZOLAM HYDROCHLORIDE 2 MG/2ML
INJECTION, SOLUTION INTRAMUSCULAR; INTRAVENOUS AS NEEDED
Status: DISCONTINUED | OUTPATIENT
Start: 2021-03-08 | End: 2021-03-08

## 2021-03-08 RX ORDER — ONDANSETRON 4 MG/1
4 TABLET, ORALLY DISINTEGRATING ORAL EVERY 8 HOURS PRN
Qty: 15 TABLET | Refills: 0 | Status: SHIPPED | OUTPATIENT
Start: 2021-03-08 | End: 2021-06-25

## 2021-03-08 RX ORDER — ONDANSETRON 2 MG/ML
INJECTION INTRAMUSCULAR; INTRAVENOUS AS NEEDED
Status: DISCONTINUED | OUTPATIENT
Start: 2021-03-08 | End: 2021-03-08

## 2021-03-08 RX ORDER — ROPIVACAINE HYDROCHLORIDE 5 MG/ML
INJECTION, SOLUTION EPIDURAL; INFILTRATION; PERINEURAL AS NEEDED
Status: DISCONTINUED | OUTPATIENT
Start: 2021-03-08 | End: 2021-03-08

## 2021-03-08 RX ORDER — FENTANYL CITRATE/PF 50 MCG/ML
50 SYRINGE (ML) INJECTION
Status: DISCONTINUED | OUTPATIENT
Start: 2021-03-08 | End: 2021-03-08 | Stop reason: HOSPADM

## 2021-03-08 RX ORDER — CEFAZOLIN SODIUM 2 G/50ML
2000 SOLUTION INTRAVENOUS ONCE
Status: COMPLETED | OUTPATIENT
Start: 2021-03-08 | End: 2021-03-08

## 2021-03-08 RX ADMIN — CHLORHEXIDINE GLUCONATE 0.12% ORAL RINSE 15 ML: 1.2 LIQUID ORAL at 06:23

## 2021-03-08 RX ADMIN — ROPIVACAINE HYDROCHLORIDE 20 ML: 5 INJECTION, SOLUTION EPIDURAL; INFILTRATION; PERINEURAL at 07:09

## 2021-03-08 RX ADMIN — SODIUM CHLORIDE 125 ML/HR: 0.9 INJECTION, SOLUTION INTRAVENOUS at 06:32

## 2021-03-08 RX ADMIN — PROPOFOL 300 MG: 10 INJECTION, EMULSION INTRAVENOUS at 07:39

## 2021-03-08 RX ADMIN — LIDOCAINE HYDROCHLORIDE 100 MG: 10 INJECTION, SOLUTION EPIDURAL; INFILTRATION; INTRACAUDAL; PERINEURAL at 07:39

## 2021-03-08 RX ADMIN — FENTANYL CITRATE 100 MCG: 50 INJECTION, SOLUTION INTRAMUSCULAR; INTRAVENOUS at 07:02

## 2021-03-08 RX ADMIN — CEFAZOLIN SODIUM 2000 MG: 2 SOLUTION INTRAVENOUS at 07:13

## 2021-03-08 RX ADMIN — OXYCODONE HYDROCHLORIDE 5 MG: 5 TABLET ORAL at 11:07

## 2021-03-08 RX ADMIN — DEXAMETHASONE SODIUM PHOSPHATE 4 MG: 4 INJECTION INTRA-ARTICULAR; INTRALESIONAL; INTRAMUSCULAR; INTRAVENOUS; SOFT TISSUE at 07:43

## 2021-03-08 RX ADMIN — SODIUM CHLORIDE: 0.9 INJECTION, SOLUTION INTRAVENOUS at 08:58

## 2021-03-08 RX ADMIN — ONDANSETRON 4 MG: 2 INJECTION INTRAMUSCULAR; INTRAVENOUS at 07:43

## 2021-03-08 RX ADMIN — MIDAZOLAM 4 MG: 1 INJECTION INTRAMUSCULAR; INTRAVENOUS at 07:02

## 2021-03-08 RX ADMIN — ROPIVACAINE HYDROCHLORIDE 20 ML: 5 INJECTION, SOLUTION EPIDURAL; INFILTRATION; PERINEURAL at 07:11

## 2021-03-08 NOTE — INTERVAL H&P NOTE
H&P reviewed  After examining the patient I find no changes in the patients condition since the H&P had been written      Vitals:    03/08/21 0716   BP: 139/88   Pulse: 89   Resp: 20   Temp:    SpO2: 98%

## 2021-03-08 NOTE — ANESTHESIA PROCEDURE NOTES
Peripheral Block    Patient location during procedure: holding area  Start time: 3/8/2021 7:02 AM  Reason for block: at surgeon's request and post-op pain management  Staffing  Anesthesiologist: Betsy Calvo DO  Performed: anesthesiologist   Preanesthetic Checklist  Completed: patient identified, site marked, surgical consent, pre-op evaluation, timeout performed, IV checked, risks and benefits discussed and monitors and equipment checked  Peripheral Block  Patient position: supine  Prep: ChloraPrep  Patient monitoring: frequent blood pressure checks and continuous pulse ox  Block type: femoral (sciatic)  Laterality: left  Injection technique: single-shot  Procedures: ultrasound guided, Ultrasound guidance required for the procedure to increase accuracy and safety of medication placement and decrease risk of complications   and nerve stimulator  Ultrasound permanent image saved  Needle  Needle type: Stimuplex   Needle gauge: 21 G  Needle length: 10 cm  Needle localization: anatomical landmarks, nerve stimulator and ultrasound guidance  Test dose: negative  Assessment  Injection assessment: incremental injection, local visualized surrounding nerve on ultrasound, negative aspiration for heme and no paresthesia on injection  Paresthesia pain: none  Heart rate change: no  Slow fractionated injection: yes  Post-procedure:  site cleaned

## 2021-03-08 NOTE — DISCHARGE INSTRUCTIONS
POSTOPERATIVE INSTRUCTIONS following KNEE SURGERY    MEDICATIONS:  · Resume all home medications unless otherwise instructed by your surgeon  · Pain Medication:  Oxycodone 5 mg, 1-3 tablets every 3 hours as needed  · If you were given a regional anesthetic (nerve block), please begin taking the pain medication as soon as you get home, even if you have minimal or no pain  DO NOT WAIT FOR THE NERVE BLOCK TO WEAR OFF  · Possible side effects include nausea, constipation, and urinary retention  If you experience these side effects, please call our office for assistance  · Pain med refills are authorized only during office hours (8am-4pm, Mon-Fri)  · Anti-Inflammatory:  Naproxen 500 mg, 1 tablet every 12 hours for 4 weeks  · Take with food  Stop if you experience nausea, reflux, or stomach pain  · Nausea Medication:  ZOfran ODT 4 mg, 1 tablet every 6 hours as needed for nausea  · Fill prescription ONLY if you expericnce severe nausea  · Blood Clot Prevention:  Not Necessary  · Pump your foot up and down 20 times per hour while you are less mobile  WOUND CARE:  · Keep the dressing clean and dry  Light drainage may occur the first 2 days postop  · You may remove the dressings and get the incision wet in the shower 72 hours after surgery  DO NOT remove steri-strips or sutures  DO NOT immerse the incision under water  Carefully pat the incision dry  If there is wound drainage, re-apply a fresh dry gauze dressing  · Please call our office ([phone]) if you experience either of the following:  · Sudden increase in swelling, redness, or warmth at the surgical site  · Excessive incisional drainage that persists beyond the 3rd day after surgery  · Oral temperature greater than 101 degrees, not relieved with Tylenol  · Shortness of breath, chest pain, nausea, or any other concerning symptoms    SWELLING CONTROL:  · Cold Therapy:   The cold therapy device may be used either continuously or only as needed, according to your preference  Do not let the pad directly touch your skin  Alternatively, apply ice (20 min on, 20 min off) as often as you feel is necessary  · Elevation:  Elevate the entire leg above heart level  Place pillows under your ankle to keep your knee straight  · Compression:  Apply ACE wraps or a thigh-length compression stocking as needed  RANGE OF MOTION:  · You are NOT ALLOWED RANGE OF MOTION until you are given permission from your surgeon  IMMOBILIZATION:  · Your knee brace should be WORN AND LOCKED AT ALL TIMES, including sleep  You may remove the brace only for showering  ACTIVITY:   · BEAR 50% PARTIAL WEIGHT on your operative leg  Always use crutches to assist   · Using Crutches on Stairs:  Going up, lead with your "good" (nonoperative) leg  Going down, lead with your "bad" (operative) leg  Use a hand rail when available  · Knee Extension:  Place a rolled towel or pillow under your ankle for 20-30 minutes 3-5 times per day  This will help to maintain full knee extension  · Quad Sets:  Sit or lie with your knee straight  Tighten your quadriceps (front thigh) muscle  Hold for 3 seconds, then relax  Repeat 20 times per hour while awake  PHYSICAL THERAPY:  · Begin therapy 5 TO 7 DAYS AFTER SURGERY  You were given a prescription for therapy at your preoperative office visit  If you do not have physical therapy scheduled yet, please call our office for assistance  FOLLOW-UP APPOINTMENT:  · 4-5 days after surgery with:    Dr Veta Kawasaki Toy Felt, [address] [facility]  [address] [place] [address], [address] [place] [address], [facility], [facility] [address]   [phone] ([facility])  [phone] (After-Hours)

## 2021-03-08 NOTE — OP NOTE
OPERATIVE REPORT    PATIENT NAME: Brennon Nixon   :  1980  MRN: [id number]  Pt Location: [facility] OR ROOM 02    SURGERY DATE: 3/8/2021    SURGEON(S) and ROLE:  Primary: Elvis Ch MD  Assisting: Torey Tilley MD; Valentino Castillo PA-C    NOTE:  I was present for the entire procedure and performed all essential portions of the surgery  PREOPERATIVE DIAGNOSES:  Left Knee  ACL Tear  Medial Meniscus Tear    POSTOPERATIVE DIAGNOSES:  Left Knee  ACL Tear  Medial Meniscus Tear    PROCEDURES:  Left Knee Arthroscopy with:  ACL Reconstruction  Medial Meniscus Repair      ANESTHESIA TYPE:  General LMA and Femoral block    ANESTHESIA STAFF:   Anesthesiologist: Juan Carlos Brito DO  CRNA: Vanessa Bob CRNA    ESTIMATED BLOOD LOSS:  25 mL    TOURNIQUET TIME:  Not used    PERIOPERATIVE ANTIBIOTICS:  cefazolin, 2 grams    IMPLANTS: ACL RECONSTRUCTION:    Graft:  Patellar tendon autograft with 10 mm femoral bone plug and 10 mm tibial bone plug    Femoral tunnel:  10 mm    Tibial tunnel:  10 mm    Femoral fixation: 8x20 mm Arthrex Biocomposite Interference screw    Tibial fixation: 8x30 mm Arthrex Biocomposite Interference screw    Implant Name Type Inv  Item Serial No   Lot No  LRB No  Used Action   FIBERSTITCH IMPLANT CRV - [id number]  FIBERSTITCH IMPLANT CRV  [employer] [id number] Left 1 Implanted   SCREW INTRFC 8 X 30MM FASTTHREAD BC - [id number]  SCREW INTRFC 8 X 30MM FASTTHREAD BC  [employer] [id number] Left 1 Implanted   SCREW INTRFC 8 X 20MM FASTTHREAD BC - [id number]  SCREW INTRFC 8 X 20MM FASTTHREAD BC  [employer] [id number] Left 1 Implanted   [id number]  GRAFT BONE CANCELLOUS CHIPS 15ML [id number] MUSCULOSKELETAL TRANSPLAN  Left 1 Implanted       SPECIMENS:  * No specimens in log *    DRAINS:  None    OPERATIVE INDICATIONS:  The patient is a 37 y o  male with left knee pain and instability with ACL tear and medial meniscus tear    Surgical treatment was indicated due to persistent symptoms despite non-surgical treatment and instability  After a thorough discussion of the potential risks, benefits, and alternative treatments, the patient agreed to proceed with surgery  The patient understands that the risks of surgery include, but are not limited to: failure of repair, infection, neurovascular injury, wound healing complications, venous thromboembolism, persistent pain, stiffness, instability, recurrence of symptoms, potential need for additional surgeries, and loss of limb or life  Oral and written consent for surgery was obtained from the patient preoperatively  EXAM UNDER ANESTHESIA:  Neutral alignment  ROM:  0-135 degrees  Grade 2B Lachman  Grade 2 pivot-shift  Stable to varus stress, valgus stress, and posterior drawer  Patella tracking normal  without crepitus  PROCEDURE AND TECHNIQUE:  On the day of surgery, the patient was identified in the preoperative holding area  The operative site was marked by the surgeon  The patient was taken into the operating room  A time-out was conducted to confirm the patient's identity, the operative site, and the proposed procedure  The patient was anesthetized, and perioperative antibiotics were administered  The patient was positioned supine on the OR table  All bony prominences were padded  A tourniquet was not used  The operative site was prepped and draped using standard sterile technique  Patellar tendon autograft was harvested through an anterior incision  The central 10 mm of patellar tendon was dissected  10 x 25 mm tibial and patellar bone plugs were harvested with a handheld saw and osteotomes  The bone plugs were prepared to the appropriate size, and shuttle sutures were placed  The graft was placed in moist gauze on the back table  An anterolateral portal was established, and the arthroscope was inserted into the knee joint    An anteromedial portal was established under direct visualization, and diagnostic arthroscopy was performed  The joint demonstrated mild synovitis  In the patellofemoral compartment, the trochlea demonstrated pristine articular cartilage  The patella demonstrated pristine articular cartilage of the entire patella  The patella tracking was normal        In the medial compartment, the medial femoral condyle demonstrated diffuse grade 2 chondral wear which required no treatment   The medial tibial plateau demonstrated pristine articular cartilage  The medial meniscus had a longitudinal undersurface tear involving the posterior horn  The tear involved the vascularized red-zone and was amenable to repair  The meniscus was prepared with a rasp and shaver, and repaired with one FiberStitch all-inside suture(s)  In the lateral compartment, the lateral femoral condyle demonstrated pristine articular cartilage  The lateral tibial plateau demonstrated pristine articular cartilage  The lateral meniscus was intact       In the intercondylar notch, the PCL was intact  The ACL was partially torn, with less than 10% of the fibers remaining intact  The ACL remnant was removed and the femoral and tibial ACL footprints were identified  The femoral tunnel was created antegrade through an accessory anteromedial portal   The tibial tunnel was created antegrade with the ACL tibial guide  The graft was passed into the tunnels using shuttle sutures  The graft was fixed on the femoral side and cycled to remove creep  The knee was placed at 20-30 degrees of flexion, a posterior drawer was applied, and the graft was fixed on the tibial side  After graft fixation, knee exam demonstrated a Grade 1A Lachman and a negative pivot-shift  At the conclusion of the procedure, the instruments were withdrawn  The portals and incisions were closed with absorbable sutures and steri-strips  A sterile dressing was applied  The surgical drapes were removed    A locked knee brace was applied to the operative knee   The patient was awakened from anesthesia and transported to the PACU in stable condition        COMPLICATIONS:  None    PATIENT DISPOSITION:  PACU       SIGNATURE:  Patricia Urias MD  DATE:  March 8, 2021  TIME:  9:13 AM

## 2021-03-08 NOTE — ANESTHESIA POSTPROCEDURE EVALUATION
Post-Op Assessment Note    CV Status:  Stable    Pain management: adequate     Mental Status:  Alert   PONV Controlled:  None   Airway Patency:  Patent      Post Op Vitals Reviewed: Yes      Staff: Anesthesiologist     Blood pressure 120/72, pulse 87, temperature 97 6 °F (36 4 °C), temperature source Tympanic, resp  rate 18, height 6' (1 829 m), weight 115 kg (253 lb), SpO2 96 %  No complications documented      BP      Temp      Pulse     Resp      SpO2

## 2021-03-08 NOTE — ANESTHESIA PREPROCEDURE EVALUATION
Procedure:  ARTHROSCOPIC RECONSTRUCTION ANTERIOR CRUCIATE LIGAMENT (ACL) WITH AUTOGRAFT (patellar tendon) (Left Knee)    Relevant Problems   GI/HEPATIC   (+) Esophageal dysphagia      Other   (+) Left ACL tear        Physical Exam    Airway    Mallampati score: II  TM Distance: >3 FB  Neck ROM: full     Dental   No notable dental hx     Cardiovascular  Rhythm: regular, Rate: normal, Cardiovascular exam normal    Pulmonary  Pulmonary exam normal Breath sounds clear to auscultation,     Other Findings        Anesthesia Plan  ASA Score- 2     Anesthesia Type- general and regional with ASA Monitors  Additional Monitors:   Airway Plan: LMA  Comment: Discussed adductor canal block  Plan Factors-Exercise tolerance (METS): >4 METS  Chart reviewed  Patient summary reviewed  Patient is not a current smoker  Patient did not smoke on day of surgery  Induction- intravenous  Postoperative Plan-   Planned trial extubation    Informed Consent- Anesthetic plan and risks discussed with patient

## 2021-03-12 ENCOUNTER — OFFICE VISIT (OUTPATIENT)
Dept: OBGYN CLINIC | Facility: MEDICAL CENTER | Age: 41
End: 2021-03-12

## 2021-03-12 VITALS
WEIGHT: 254 LBS | SYSTOLIC BLOOD PRESSURE: 156 MMHG | HEART RATE: 105 BPM | HEIGHT: 72 IN | DIASTOLIC BLOOD PRESSURE: 103 MMHG | BODY MASS INDEX: 34.4 KG/M2

## 2021-03-12 DIAGNOSIS — S83.512D RUPTURE OF ANTERIOR CRUCIATE LIGAMENT OF LEFT KNEE, SUBSEQUENT ENCOUNTER: Primary | ICD-10-CM

## 2021-03-12 PROCEDURE — 99024 POSTOP FOLLOW-UP VISIT: CPT | Performed by: ORTHOPAEDIC SURGERY

## 2021-03-12 PROCEDURE — 3008F BODY MASS INDEX DOCD: CPT | Performed by: FAMILY MEDICINE

## 2021-03-12 NOTE — PROGRESS NOTES
Orthopaedic Surgery - Office Note  Barak Browning (37 y o  male)   : 1980   MRN: [id number]  Encounter Date: 3/12/2021    Chief Complaint   Patient presents with    Left Knee - Post-op       Assessment / Plan  S/p left knee arthroscopy with ACL reconstruction using patella tendon autograft and medial meniscus repair on 3/08/21     · Begin outpatient PT for left knee ACL reconstruction  Protocol given today in office   · Continue to use naproxen for pain management and oxycodone PRN for serve pain  · Begin quad sets, demonstrated today in office   · Continue to use ice for pain and swelling management   Return in about 1 month (around 2021)  History of Present Illness  Barak Browning is a 37 y o  male who presents for follow up S/p left knee arthroscopy with ACL reconstruction using patella tendon autograft and medial meniscus repair on 3/08/21  He states that his pain is well controlled with naproxen and oxycodone  He has been wearing the locked brace and using crutches PWB  He is scheduled to begin PT on 3/15/2021  He denies any numbness, tingling, fever or chills  He offers no complaints at this time  Review of Systems  Pertinent items are noted in HPI  All other systems were reviewed and are negative  Physical Exam  BP (!) 156/103   Pulse 105   Ht 6' (1 829 m)   Wt 115 kg (254 lb)   BMI 34 45 kg/m²   Cons: Appears well  No apparent distress  Psych: Alert  Oriented x3  Mood and affect normal   Eyes: PERRLA, EOMI  Resp: Normal effort  No audible wheezing or stridor  CV: Palpable pulse  No discernable arrhythmia  No LE edema  Lymph:  No palpable cervical, axillary, or inguinal lymphadenopathy  Skin: Warm  No palpable masses  No visible lesions  Neuro: Normal muscle tone  Normal and symmetric DTR's  Left Knee Exam  Alignment:  Normal knee alignment  Inspection:  mild swelling  No ecchymosis  Incision clean and dry  Palpation:  mild incision site tenderness   No warmth  ROM:  Normal ankle ROM  Strength:  Not tested  Stability:  Not tested  Tests:  Not tested  Patella:  Not tested  Neurovascular:  Sensation intact in DP/SP/Conley/Sa/T nerve distributions  2+ DP & PT pulses  Gait:  steady on crutches    Studies Reviewed  No studies to review    Procedures  No procedures today  Medical, Surgical, Family, and Social History  The patient's medical history, family history, and social history, were reviewed and updated as appropriate  Past Medical History:   Diagnosis Date    Concussion     unsure of LOC- skiing accident 2/21/21    Wears contact lenses        Past Surgical History:   Procedure Laterality Date    EGD      ORIF TIBIA & FIBULA FRACTURES Right     right ankle    MI KNEE SCOPE,AID ANT CRUCIATE REPAIR Left 3/8/2021    Procedure: ARTHROSCOPIC RECONSTRUCTION ANTERIOR CRUCIATE LIGAMENT (ACL) WITH AUTOGRAFT (patellar tendon); MENISCUS REPAIR;  Surgeon: Louie Jones MD;  Location: [facility];  Service: Orthopedics    TIBIA FRACTURE SURGERY         History reviewed  No pertinent family history  Social History     Occupational History    Not on file   Tobacco Use    Smoking status: Never Smoker    Smokeless tobacco: Never Used   Substance and Sexual Activity    Alcohol use: Yes     Frequency: Monthly or less     Comment: socially    Drug use: Never    Sexual activity: Yes     Partners: Female       No Known Allergies    No current facility-administered medications for this visit       Current Outpatient Medications:     naproxen (NAPROSYN) 500 mg tablet, Take 1 tablet (500 mg total) by mouth 2 (two) times a day with meals, Disp: 60 tablet, Rfl: 0    ondansetron (ZOFRAN-ODT) 4 mg disintegrating tablet, Take 1 tablet (4 mg total) by mouth every 8 (eight) hours as needed for nausea or vomiting, Disp: 15 tablet, Rfl: 0    oxyCODONE (ROXICODONE) 5 mg immediate release tablet, Take 1 tablet (5 mg total) by mouth every 4 (four) hours as needed for moderate pain for up to 10 daysMax Daily Amount: 30 mg, Disp: 45 tablet, Rfl: 0    Facility-Administered Medications Ordered in Other Visits:     morphine injection 2 mg, 2 mg, Intravenous, Q4H PRN, Rebeca Malin PA-C    oxyCODONE (ROXICODONE) IR tablet 10 mg, 10 mg, Oral, Q4H PRN, Rebeca Dea PA-DEISI    oxyCODONE (ROXICODONE) IR tablet 5 mg, 5 mg, Oral, Q4H PRN, CONI Luna-DESII, 5 mg at 03/08/21 1107    sodium chloride 0 9 % infusion, 125 mL/hr, Intravenous, Continuous, Taylor Garcia DO, Stopped at 03/08/21 [address]    I,:  Jerry Jalloh am acting as a scribe while in the presence of the attending physician :       I,:  Humble Soliman MD personally performed the services described in this documentation    as scribed in my presence :

## 2021-03-15 ENCOUNTER — EVALUATION (OUTPATIENT)
Dept: PHYSICAL THERAPY | Facility: CLINIC | Age: 41
End: 2021-03-15
Payer: COMMERCIAL

## 2021-03-15 DIAGNOSIS — S89.92XA INJURY OF LEFT KNEE, INITIAL ENCOUNTER: ICD-10-CM

## 2021-03-15 PROCEDURE — 97110 THERAPEUTIC EXERCISES: CPT | Performed by: PHYSICAL THERAPIST

## 2021-03-15 PROCEDURE — 97162 PT EVAL MOD COMPLEX 30 MIN: CPT | Performed by: PHYSICAL THERAPIST

## 2021-03-19 ENCOUNTER — OFFICE VISIT (OUTPATIENT)
Dept: PHYSICAL THERAPY | Facility: CLINIC | Age: 41
End: 2021-03-19
Payer: COMMERCIAL

## 2021-03-19 DIAGNOSIS — S89.92XA INJURY OF LEFT KNEE, INITIAL ENCOUNTER: Primary | ICD-10-CM

## 2021-03-19 PROCEDURE — 97110 THERAPEUTIC EXERCISES: CPT

## 2021-03-19 NOTE — PROGRESS NOTES
Daily Note     Today's date: 3/19/2021  Patient name: Brett Mosley  : 1980  MRN: [id number]  Referring provider: Lui Rodriguez MD  Dx:   Encounter Diagnosis     ICD-10-CM    1  Injury of left knee, initial encounter  S89  92XA                   Subjective: Pt reports pain level is 3-4/10  Objective: See treatment diary below      Assessment: Reviewed/performed HEP  Required assist for SLR flex  Added 4 way ankle TB exercises, w/o issue  Comfortable during PROM to L patella  Tolerated treatment well  Issued blue TB and written HEP instructions, reviewed same w/pt  Patient would benefit from continued PT      Plan: Continue per plan of care        Precautions: ACL 3/8 PTB autograft, meniscus repair; limit flexion to 90 1st 3 wks      Manuals 3/15 3/19           Patella mobs mb mo PROM                                                  Neuro Re-Ed             Quad set IP Review  3"x30           NMES prn                                                                              Ther Ex             SLR in brace IP AA flex,  abd,ext 2x10  ea           Heel slides IP 3"x30           Ankle tband  Blue  2x10ea           Standing ham curl             Heel raises                                                    Ther Activity                                       Gait Training                                       Modalities

## 2021-03-22 NOTE — DISCHARGE SUMMARY
Orthopaedic Surgery - Discharge Summary  Karey Wong (64 y o  male)   : 1980   MRN: [id number]  Encounter: [id number]   Unit/Bed#: OR POOL    Admission Date: 3/8/2021    Discharge Date: 21    Discharge Diagnosis: Rupture of anterior cruciate ligament of left knee, initial encounter [S83 512A]    Procedures Performed: Procedure(s) (LRB):  ARTHROSCOPIC RECONSTRUCTION ANTERIOR CRUCIATE LIGAMENT (ACL) WITH AUTOGRAFT (patellar tendon); MENISCUS REPAIR (Left)    Hospital Course: Karey Wong is a 37 y o  male brought to surgery on 3/8/2021  With the diagnosis of a ruptured ACL of the left knee  Following surgery would stable and was discharged home after short stay in the recovery area  At the time of discharge on 3/8/2021, the patient was tolerating PO diet, voiding, and pain was well-controlled on oral medications  Significant Findings, Care, Treatment and Services Provided: None    Complications: None    Condition at Discharge: good     Discharge instructions:   See After Visit Summary for discharge instructions  Follow-Up Care:  See After Visit Summary for information related to follow-up care  Disposition: Home    Planned Readmission: No    Discharge Statement   I spent 20 minutes discharging the patient  This time was spent on the day of discharge  I had direct contact with the patient on the day of discharge  Discharge Medications:  See after visit summary for reconciled discharge medications provided to patient and family      Alber Peterson PA-C

## 2021-03-24 ENCOUNTER — OFFICE VISIT (OUTPATIENT)
Dept: PHYSICAL THERAPY | Facility: CLINIC | Age: 41
End: 2021-03-24
Payer: COMMERCIAL

## 2021-03-24 DIAGNOSIS — S83.512D RUPTURE OF ANTERIOR CRUCIATE LIGAMENT OF LEFT KNEE, SUBSEQUENT ENCOUNTER: Primary | ICD-10-CM

## 2021-03-24 PROCEDURE — 97112 NEUROMUSCULAR REEDUCATION: CPT | Performed by: PHYSICAL THERAPIST

## 2021-03-24 PROCEDURE — 97110 THERAPEUTIC EXERCISES: CPT | Performed by: PHYSICAL THERAPIST

## 2021-03-24 PROCEDURE — 97014 ELECTRIC STIMULATION THERAPY: CPT | Performed by: PHYSICAL THERAPIST

## 2021-03-24 NOTE — PROGRESS NOTES
Daily Note     Today's date: 3/24/2021  Patient name: Emilia Eastman  : 1980  MRN: [id number]  Referring provider: Leslee Chan MD  Dx:   Encounter Diagnosis     ICD-10-CM    1  Rupture of anterior cruciate ligament of left knee, subsequent encounter  S83 512D                   Subjective: feeling much better      Objective: See treatment diary below      Assessment: Tolerated treatment well  Patient demonstrated fatigue post treatment      Plan: Continue per plan of care        Precautions: ACL 3/8 PTB autograft, meniscus repair; limit flexion to 90 1st 3 wks      Manuals 3/15 3/19 3/24          Patella mobs mb mo PROM mb                                                 Neuro Re-Ed             Quad set IP Review  3"x30 30          Russian stim 10/30   10'                                                                           Ther Ex             SLR in brace IP AA flex,  abd,ext 2x10  ea Flx, abd, ext 30 ea          Heel slides IP 3"x30           Ankle tband  Blue  2x10ea           Standing ham curl   3x10          Heel raises   3x10                                                 Ther Activity                                       Gait Training             Step over cone  12' x6 12'x6          Gait w 1 crutch  20'x4 20'x4                       Modalities

## 2021-03-26 ENCOUNTER — OFFICE VISIT (OUTPATIENT)
Dept: PHYSICAL THERAPY | Facility: CLINIC | Age: 41
End: 2021-03-26
Payer: COMMERCIAL

## 2021-03-26 DIAGNOSIS — S89.92XA INJURY OF LEFT KNEE, INITIAL ENCOUNTER: ICD-10-CM

## 2021-03-26 DIAGNOSIS — S83.512D RUPTURE OF ANTERIOR CRUCIATE LIGAMENT OF LEFT KNEE, SUBSEQUENT ENCOUNTER: Primary | ICD-10-CM

## 2021-03-26 PROCEDURE — 97110 THERAPEUTIC EXERCISES: CPT

## 2021-03-26 PROCEDURE — 97112 NEUROMUSCULAR REEDUCATION: CPT

## 2021-03-26 PROCEDURE — 97014 ELECTRIC STIMULATION THERAPY: CPT

## 2021-03-26 NOTE — PROGRESS NOTES
Daily Note     Today's date: 3/26/2021  Patient name: Mona Richard  : 1980  MRN: [id number]  Referring provider: Alexandria Mehta MD  Dx:   Encounter Diagnosis     ICD-10-CM    1  Rupture of anterior cruciate ligament of left knee, subsequent encounter  S83 512D    2  Injury of left knee, initial encounter  S89  92XA                   Subjective: Pt reports no L knee pain, only taking Naproxen  Objective: See treatment diary below      Assessment: Improved quad contraction w/stim  Performed exercise progression w/o complaint  Unlocked brace 0-60*  Tolerated treatment well  Issued black TB to progress ankle exercises as april  Will monitor  Patient would benefit from continued PT      Plan: Continue per plan of care        Precautions: ACL 3/8 PTB autograft, meniscus repair; limit flexion to 90 1st 3 wks      Manuals 3/15 3/19 3/24 3/26         Patella mobs mb mo PROM mb mo  PROM                                                Neuro Re-Ed             Quad set IP Review  3"x30 30 30         Russian stim 10/30   10' 10'                                                                          Ther Ex             SLR in brace IP AA flex,  abd,ext 2x10  ea Flx, abd, ext 30 ea Flx, abd, ext  30ea         Heel slides IP 3"x30           Ankle tband  Blue  2x10ea  Issued  Black         Standing ham curl   3x10 3x10         Heel raises   3x10 3x10                                                Ther Activity                                       Gait Training             Step over cone  12' x6 12'x6 12'x6         Gait w 1 crutch  20'x4 20'x4 20'x6                      Modalities

## 2021-03-30 ENCOUNTER — OFFICE VISIT (OUTPATIENT)
Dept: PHYSICAL THERAPY | Facility: CLINIC | Age: 41
End: 2021-03-30
Payer: COMMERCIAL

## 2021-03-30 DIAGNOSIS — S89.92XA INJURY OF LEFT KNEE, INITIAL ENCOUNTER: ICD-10-CM

## 2021-03-30 DIAGNOSIS — S83.512D RUPTURE OF ANTERIOR CRUCIATE LIGAMENT OF LEFT KNEE, SUBSEQUENT ENCOUNTER: Primary | ICD-10-CM

## 2021-03-30 PROCEDURE — 97014 ELECTRIC STIMULATION THERAPY: CPT | Performed by: PHYSICAL THERAPIST

## 2021-03-30 NOTE — PROGRESS NOTES
Daily Note     Today's date: 3/30/2021  Patient name: Hugh Castillo  : 1980  MRN: [id number]  Referring provider: Matt Caputo MD  Dx:   Encounter Diagnosis     ICD-10-CM    1  Rupture of anterior cruciate ligament of left knee, subsequent encounter  S83 512D    2  Injury of left knee, initial encounter  S89  92XA                   Subjective: knee is feeling pretty good      Objective: See treatment diary below      Assessment: Tolerated treatment well  Patient would benefit from continued PT      Plan: Continue per plan of care        Precautions: ACL 3/8 PTB autograft, meniscus repair; limit flexion to 90 1st 3 wks      Manuals 3/15 3/19 3/24 3/26 3/29        Patella mobs mb mo PROM mb mo  PROM mb                                               Neuro Re-Ed             Quad set IP Review  3"x30 30 30         Russian stim 10/30   10' 10' 10'        biodex catch     4' L12                                                            Ther Ex             SLR in brace IP AA flex,  abd,ext 2x10  ea Flx, abd, ext 30 ea Flx, abd, ext  30ea No brace x30        Heel slides IP 3"x30           Ankle tband  Blue  2x10ea  Issued  Black         Standing ham curl   3x10 3x10 30        Heel raises   3x10 3x10 30        squats     30 on biodex        Ham curls     2 pl x30        TKE     sony 2x30        Ther Activity                                       Gait Training             Step over cone   x 12'x6 'xx8        Gait w 1 crutch  20'x4 20'x4 20'x6         Side step             Modalities

## 2021-04-02 ENCOUNTER — OFFICE VISIT (OUTPATIENT)
Dept: PHYSICAL THERAPY | Facility: CLINIC | Age: 41
End: 2021-04-02
Payer: COMMERCIAL

## 2021-04-02 DIAGNOSIS — S83.512D RUPTURE OF ANTERIOR CRUCIATE LIGAMENT OF LEFT KNEE, SUBSEQUENT ENCOUNTER: Primary | ICD-10-CM

## 2021-04-02 DIAGNOSIS — S89.92XA INJURY OF LEFT KNEE, INITIAL ENCOUNTER: ICD-10-CM

## 2021-04-02 PROCEDURE — 97110 THERAPEUTIC EXERCISES: CPT | Performed by: PHYSICAL THERAPIST

## 2021-04-02 PROCEDURE — 97112 NEUROMUSCULAR REEDUCATION: CPT | Performed by: PHYSICAL THERAPIST

## 2021-04-02 NOTE — PROGRESS NOTES
Daily Note     Today's date: 2021  Patient name: Gauri Loredo  : 1980  MRN: [id number]  Referring provider: Gertrude Oliveros MD  Dx:   Encounter Diagnosis     ICD-10-CM    1  Rupture of anterior cruciate ligament of left knee, subsequent encounter  S83 512D    2  Injury of left knee, initial encounter  S89  92XA                   Subjective: reports continued progress      Objective: See treatment diary below      Assessment: Tolerated treatment well  Patient demonstrated fatigue post treatment      Plan: Continue per plan of care        Precautions: ACL 3/8 PTB autograft, meniscus repair; limit flexion to 90 1st 3 wks      Manuals 3/15 3/19 3/24 3/26 3/29 4/2       Patella mobs mb mo PROM mb mo  PROM mb                                               Neuro Re-Ed             Quad set IP Review  3"x30 30 30         Russian stim 10/30   10' 10' 10'        biodex catch     4' L12 4' L12 med                                              Ther Ex             bike      10' seat 10       SLR in brace IP AA flex,  abd,ext 2x10  ea Flx, abd, ext 30 ea Flx, abd, ext  30ea No brace x30        Leg press      1 pl 2x30       Step ups      6" x30       Ankle tband  Blue  2x10ea  Issued  Black         Standing ham curl   3x10 3x10 30 30       Heel raises   3x10 3x10 30 30       squats     30 on biodex 30 biodex       Ham curls     2 pl x30 2pl 3x10       TKE     sony 2x30 soyn 2x30       Ther Activity                                       Gait Training             Step over cone  12' x6 12'x6 12'x6 'x8 Frwd, side 3' ea       Gait w 1 crutch  20'x4 20'x4 20'x6         stairs      x3       Side step             Modalities

## 2021-04-04 DIAGNOSIS — K20.90 ESOPHAGITIS: ICD-10-CM

## 2021-04-04 RX ORDER — FAMOTIDINE 40 MG/1
TABLET, FILM COATED ORAL
Qty: 90 TABLET | Refills: 2 | Status: SHIPPED | OUTPATIENT
Start: 2021-04-04 | End: 2021-12-04

## 2021-04-05 ENCOUNTER — OFFICE VISIT (OUTPATIENT)
Dept: PHYSICAL THERAPY | Facility: CLINIC | Age: 41
End: 2021-04-05
Payer: COMMERCIAL

## 2021-04-05 DIAGNOSIS — S83.512D RUPTURE OF ANTERIOR CRUCIATE LIGAMENT OF LEFT KNEE, SUBSEQUENT ENCOUNTER: Primary | ICD-10-CM

## 2021-04-05 DIAGNOSIS — S89.92XA INJURY OF LEFT KNEE, INITIAL ENCOUNTER: ICD-10-CM

## 2021-04-05 PROCEDURE — 97110 THERAPEUTIC EXERCISES: CPT | Performed by: PHYSICAL THERAPIST

## 2021-04-05 PROCEDURE — 97530 THERAPEUTIC ACTIVITIES: CPT | Performed by: PHYSICAL THERAPIST

## 2021-04-05 PROCEDURE — 97112 NEUROMUSCULAR REEDUCATION: CPT | Performed by: PHYSICAL THERAPIST

## 2021-04-05 PROCEDURE — 97014 ELECTRIC STIMULATION THERAPY: CPT | Performed by: PHYSICAL THERAPIST

## 2021-04-05 NOTE — PROGRESS NOTES
Daily Note     Today's date: 2021  Patient name: Gauri Loredo  : 1980  MRN: [id number]  Referring provider: Gertrude Oliveros MD  Dx:   Encounter Diagnosis     ICD-10-CM    1  Rupture of anterior cruciate ligament of left knee, subsequent encounter  S83 512D    2  Injury of left knee, initial encounter  S89  92XA                   Subjective: c/o discomfort around patella - mostly lateral      Objective: See treatment diary below      Assessment: Tolerated treatment well  Patient demonstrated fatigue post treatment      Plan: Continue per plan of care        Precautions: ACL 3/8 PTB autograft, meniscus repair; limit flexion to 90 1st 3 wks      Manuals 3/15 3/19 3/24 3/26 3/29 4/2 4/5      Patella mobs mb mo PROM mb mo  PROM mb  mb                                             Neuro Re-Ed             Quad set IP Review  3"x30 30 30         Russian stim 10/30   10' 10' 10'  W/slr 10''      biodex catch     4' L12 4' L12 med 4' L12 med                                             Ther Ex             bike      10' seat 10 10' seat 9      SLR in brace IP AA flex,  abd,ext 2x10  ea Flx, abd, ext 30 ea Flx, abd, ext  30ea No brace x30  E-stim 10'      Leg press      1 pl 2x30 1 pl 2x30 2 pl     Step ups      6" x30       Ankle tband  Blue  2x10ea  Issued  Black         Standing ham curl   3x10 3x10 30 30       Heel raises   3x10 3x10 30 30 nv      squats     30 on biodex 30 biodex 30 biodex L11      Ham curls     2 pl x30 2pl 3x10 2 pl 3x10      TKE     sony 2x30 sony 2x30 sony 2x30      Ther Activity                                       Gait Training             Step over cone  12' x6 12'x6 12'x6 x8 Frwd, side 3' ea       Gait w 1 crutch  20'x4 20'x4 20'x6         stairs      x3 x4      Side step       nv      TM retro       5'      Modalities

## 2021-04-07 ENCOUNTER — OFFICE VISIT (OUTPATIENT)
Dept: PHYSICAL THERAPY | Facility: CLINIC | Age: 41
End: 2021-04-07
Payer: COMMERCIAL

## 2021-04-07 DIAGNOSIS — S83.512D RUPTURE OF ANTERIOR CRUCIATE LIGAMENT OF LEFT KNEE, SUBSEQUENT ENCOUNTER: Primary | ICD-10-CM

## 2021-04-07 DIAGNOSIS — S89.92XA INJURY OF LEFT KNEE, INITIAL ENCOUNTER: ICD-10-CM

## 2021-04-07 PROCEDURE — 97530 THERAPEUTIC ACTIVITIES: CPT | Performed by: PHYSICAL THERAPIST

## 2021-04-07 PROCEDURE — 97112 NEUROMUSCULAR REEDUCATION: CPT | Performed by: PHYSICAL THERAPIST

## 2021-04-07 PROCEDURE — 97110 THERAPEUTIC EXERCISES: CPT | Performed by: PHYSICAL THERAPIST

## 2021-04-07 NOTE — PROGRESS NOTES
Daily Note     Today's date: 2021  Patient name: William Carbajal  : 1980  MRN: [id number]  Referring provider: Jayden Smith MD  Dx:   Encounter Diagnosis     ICD-10-CM    1  Rupture of anterior cruciate ligament of left knee, subsequent encounter  S83 512D    2  Injury of left knee, initial encounter  S89  92XA                   Subjective: reports some soreness, thinks he may have "overdone it" yesterday with activity      Objective: See treatment diary below      Assessment: Tolerated treatment well  Patient demonstrated fatigue post treatment      Plan: Continue per plan of care        Precautions: ACL 3/8 PTB autograft, meniscus repair; limit flexion to 90 1st 3 wks      Manuals 3/15 3/19 3/24 3/26 3/29 4/2 4/5 4/7     Patella mobs mb mo PROM mb mo  PROM mb  mb mb                                            Neuro Re-Ed             Quad set IP Review  3"x30 30 30         Russian stim 10/30   10' 10' 10'  W/slr 10'' 4s 12'     biodex catch     4' L12 4' L12 med 4' L12 med 4' static     SLB        3'                               Ther Ex             bike      10' seat 10 10' seat 9 10'     SLR in brace IP AA flex,  abd,ext 2x10  ea Flx, abd, ext 30 ea Flx, abd, ext  30ea No brace x30  E-stim 10' estim 12'     Leg press      1 pl 2x30 1 pl 2x30 2 pl 2x30     Step ups      6" x30  6"x30     Ankle tband  Blue  2x10ea  Issued  Black         Standing ham curl   3x10 3x10 30 30       Heel raises   3x10 3x10 30 30 nv 30     squats     30 on biodex 30 biodex 30 biodex L11      Ham curls     2 pl x30 2pl 3x10 2 pl 3x10 3 pl x30     TKE     sony 2x30 sony 2x30 sony 2x30 Blue 2x30     s             Ther Activity                                       Gait Training             Step over cone  12' x6 12'x6 12'x6 x8 Frwd, side 3' ea       Gait w 1 crutch  20'x4 20'x4 20'x6         stairs      x3 x4      Side step       nv      TM retro       5'      Modalities

## 2021-04-09 ENCOUNTER — OFFICE VISIT (OUTPATIENT)
Dept: OBGYN CLINIC | Facility: MEDICAL CENTER | Age: 41
End: 2021-04-09

## 2021-04-09 VITALS
HEART RATE: 105 BPM | DIASTOLIC BLOOD PRESSURE: 99 MMHG | SYSTOLIC BLOOD PRESSURE: 156 MMHG | HEIGHT: 72 IN | BODY MASS INDEX: 35.08 KG/M2 | WEIGHT: 259 LBS

## 2021-04-09 DIAGNOSIS — S83.512D RUPTURE OF ANTERIOR CRUCIATE LIGAMENT OF LEFT KNEE, SUBSEQUENT ENCOUNTER: Primary | ICD-10-CM

## 2021-04-09 PROCEDURE — 3008F BODY MASS INDEX DOCD: CPT | Performed by: FAMILY MEDICINE

## 2021-04-09 PROCEDURE — 99024 POSTOP FOLLOW-UP VISIT: CPT | Performed by: ORTHOPAEDIC SURGERY

## 2021-04-09 NOTE — PROGRESS NOTES
Orthopaedic Surgery - Office Note  Maris Galindo (37 y o  male)   : 1980   MRN: [id number]  Encounter Date: 2021    Chief Complaint   Patient presents with    Left Knee - Follow-up       Assessment / Plan  S/p left knee arthroscopy with ACL reconstruction using patella tendon autograft and medial meniscus repair on 3/08/21     · Continue with outpatient physical therapy following ACL reconstruction protocol  · Continue to keep 2 feet on the ground, continue working on strength  No running, jogging or jumping  · He can swim but avoid kicking at this time, feet on the ground  · Continue with compression and ice PRN for swelling  Return in about 1 month (around 2021)  History of Present Illness  Maris Galindo is a 37 y o  male who presents for follow up S/p left knee arthroscopy with ACL reconstruction using patella tendon autograft and medial meniscus repair on 3/08/21  He states that PT is going well and he d/c the brace on Wednesday  He states that he feels stable without the brace but does notice his gait is still not normal  He denies any recent pain or the use of pain medication  Overall, he continues to be happy with his progress and denies any radiating symptoms  Review of Systems  Pertinent items are noted in HPI  All other systems were reviewed and are negative  Physical Exam  /99   Pulse 105   Ht 6' (1 829 m)   Wt 117 kg (259 lb)   BMI 35 13 kg/m²   Cons: Appears well  No apparent distress  Psych: Alert  Oriented x3  Mood and affect normal   Eyes: PERRLA, EOMI  Resp: Normal effort  No audible wheezing or stridor  CV: Palpable pulse  No discernable arrhythmia  No LE edema  Lymph:  No palpable cervical, axillary, or inguinal lymphadenopathy  Skin: Warm  No palpable masses  No visible lesions  Neuro: Normal muscle tone  Normal and symmetric DTR's  Left Knee Exam  Alignment:  Normal knee alignment  Inspection:  mild swelling  No edema  No erythema   No ecchymosis  Incision clean and dry  Palpation:  No tenderness  No effusion  ROM:  Knee Extension 0  Knee Flexion 120  Strength:  Able to SLR without lag  Able to actively extend knee against gravity  Stability:  No objective knee instability  Stable Varus / Valgus stress, Lachman, and Posterior drawer  Tests:  No pertinent positive or negative tests  Patella:  Normal patellar mobility  Neurovascular:  Sensation intact in DP/SP/Conley/Sa/T nerve distributions  2+ DP & PT pulses  Gait:  Heel-to-toe  Studies Reviewed  No studies to review    Procedures  No procedures today  Medical, Surgical, Family, and Social History  The patient's medical history, family history, and social history, were reviewed and updated as appropriate  Past Medical History:   Diagnosis Date    Concussion     unsure of LOC- skiing accident 2/21/21    Wears contact lenses        Past Surgical History:   Procedure Laterality Date    EGD      ORIF TIBIA & FIBULA FRACTURES Right     right ankle    UT KNEE SCOPE,AID ANT CRUCIATE REPAIR Left 3/8/2021    Procedure: ARTHROSCOPIC RECONSTRUCTION ANTERIOR CRUCIATE LIGAMENT (ACL) WITH AUTOGRAFT (patellar tendon); MENISCUS REPAIR;  Surgeon: Vipin Abdi MD;  Location: [facility] OR;  Service: Orthopedics    TIBIA FRACTURE SURGERY         History reviewed  No pertinent family history      Social History     Occupational History    Not on file   Tobacco Use    Smoking status: Never Smoker    Smokeless tobacco: Never Used   Substance and Sexual Activity    Alcohol use: Yes     Frequency: Monthly or less     Comment: socially    Drug use: Never    Sexual activity: Yes     Partners: Female       No Known Allergies      Current Outpatient Medications:     Ascorbic Acid (Vitamin C) 500 MG CAPS, Take 1 capsule by mouth daily, Disp: , Rfl:     famotidine (PEPCID) 40 MG tablet, TAKE 1 TABLET BY MOUTH EVERY DAY, Disp: 90 tablet, Rfl: 2    Multiple Vitamin (multivitamin) tablet, Take 1 tablet by mouth daily, Disp: , Rfl:     naproxen (NAPROSYN) 500 mg tablet, Take 1 tablet (500 mg total) by mouth 2 (two) times a day with meals, Disp: 60 tablet, Rfl: 0    ondansetron (ZOFRAN-ODT) 4 mg disintegrating tablet, Take 1 tablet (4 mg total) by mouth every 8 (eight) hours as needed for nausea or vomiting, Disp: 15 tablet, Rfl: 0    Zinc 50 MG TABS, Take 1 tablet by mouth daily, Disp: , Rfl:     acetaminophen (TYLENOL) 325 mg tablet, Take 650 mg by mouth every 6 (six) hours as needed for mild pain, Disp: , Rfl:     cyclobenzaprine (FLEXERIL) 10 mg tablet, Take 1 tablet (10 mg total) by mouth every 12 (twelve) hours as needed for muscle spasms, Disp: 30 tablet, Rfl: 0      [employer]    I,:  Liam Robles am acting as a scribe while in the presence of the attending physician :       I,:  Sisi Serrato MD personally performed the services described in this documentation    as scribed in my presence :

## 2021-04-12 ENCOUNTER — OFFICE VISIT (OUTPATIENT)
Dept: PHYSICAL THERAPY | Facility: CLINIC | Age: 41
End: 2021-04-12
Payer: COMMERCIAL

## 2021-04-12 DIAGNOSIS — S83.512D RUPTURE OF ANTERIOR CRUCIATE LIGAMENT OF LEFT KNEE, SUBSEQUENT ENCOUNTER: Primary | ICD-10-CM

## 2021-04-12 DIAGNOSIS — S89.92XA INJURY OF LEFT KNEE, INITIAL ENCOUNTER: ICD-10-CM

## 2021-04-12 PROCEDURE — 97110 THERAPEUTIC EXERCISES: CPT | Performed by: PHYSICAL THERAPIST

## 2021-04-12 PROCEDURE — 97112 NEUROMUSCULAR REEDUCATION: CPT | Performed by: PHYSICAL THERAPIST

## 2021-04-12 PROCEDURE — 97530 THERAPEUTIC ACTIVITIES: CPT | Performed by: PHYSICAL THERAPIST

## 2021-04-12 PROCEDURE — 97014 ELECTRIC STIMULATION THERAPY: CPT | Performed by: PHYSICAL THERAPIST

## 2021-04-12 NOTE — PROGRESS NOTES
Daily Note     Today's date: 2021  Patient name: Andrew Rodriguez  : 1980  MRN: [id number]  Referring provider: Jeff Raymond MD  Dx:   Encounter Diagnosis     ICD-10-CM    1  Rupture of anterior cruciate ligament of left knee, subsequent encounter  S83 512D    2  Injury of left knee, initial encounter  S89  92XA                   Subjective: has been doing well without the brace      Objective: See treatment diary below      Assessment: Tolerated treatment well  Patient demonstrated fatigue post treatment      Plan: Continue per plan of care        Precautions: ACL 3/8 PTB autograft, meniscus repair; limit flexion to 90 1st 3 wks      Manuals 3/15 3/19 3/24 3/26 3/29 4/2 4/5 4/7 4/12    Patella mobs mb mo PROM mb mo  PROM mb  mb mb                                            Neuro Re-Ed             Quad set IP Review  3"x30 30 30         Russian stim 10/30   10' 10' 10'  W/slr 10'' 4/12s 12'  10' SLR    biodex catch     4' L12 4' L12 med 4' L12 med 4' static 4' L12    SLB        3'                               Ther Ex             bike      10' seat 10 10' seat 9 10' 10'    SLR in brace IP AA flex,  abd,ext 2x10  ea Flx, abd, ext 30 ea Flx, abd, ext  30ea No brace x30  E-stim 10' estim 12' estim    Leg press      1 pl 2x30 1 pl 2x30 2 pl 2x30 3 pl 2x30    Step ups      6" x30  6"x30     Ankle tband  Blue  2x10ea  Issued  Black         Standing ham curl   3x10 3x10 30 30       Heel raises   3x10 3x10 30 30 nv 30 30    squats     30 on biodex 30 biodex 30 biodex L11  nv    Ham curls     2 pl x30 2pl 3x10 2 pl 3x10 3 pl x30 3 pl x30    TKE     sony 2x30 sony 2x30 sony 2x30 Blue 2x30     bridges         10s 2x10    Ther Activity             bike         bike    stepper         5'    Gait Training             Step over cone  12' x6 12'x6 12'x6 'x8 Frwd, side 3' ea       Gait w 1 crutch  20'x4 20'x4 20'x6         stairs      x3 x4      Side step       nv      TM retro       5' 5' 5'    Modalities

## 2021-04-14 ENCOUNTER — OFFICE VISIT (OUTPATIENT)
Dept: PHYSICAL THERAPY | Facility: CLINIC | Age: 41
End: 2021-04-14
Payer: COMMERCIAL

## 2021-04-14 DIAGNOSIS — S83.512D RUPTURE OF ANTERIOR CRUCIATE LIGAMENT OF LEFT KNEE, SUBSEQUENT ENCOUNTER: Primary | ICD-10-CM

## 2021-04-14 DIAGNOSIS — S89.92XA INJURY OF LEFT KNEE, INITIAL ENCOUNTER: ICD-10-CM

## 2021-04-14 PROCEDURE — 97530 THERAPEUTIC ACTIVITIES: CPT | Performed by: PHYSICAL THERAPIST

## 2021-04-14 PROCEDURE — 97140 MANUAL THERAPY 1/> REGIONS: CPT | Performed by: PHYSICAL THERAPIST

## 2021-04-14 PROCEDURE — 97110 THERAPEUTIC EXERCISES: CPT | Performed by: PHYSICAL THERAPIST

## 2021-04-14 PROCEDURE — 97112 NEUROMUSCULAR REEDUCATION: CPT | Performed by: PHYSICAL THERAPIST

## 2021-04-14 NOTE — PROGRESS NOTES
Daily Note     Today's date: 2021  Patient name: Aiyana Warner  : 1980  MRN: [id number]  Referring provider: James Reynolds MD  Dx:   Encounter Diagnosis     ICD-10-CM    1  Rupture of anterior cruciate ligament of left knee, subsequent encounter  S83 512D    2  Injury of left knee, initial encounter  S89  92XA                   Subjective: reports continued improvement      Objective: See treatment diary below      Assessment: Tolerated treatment well  Patient demonstrated fatigue post treatment      Plan: Continue per plan of care        Precautions: ACL 3/8 PTB autograft, meniscus repair; limit flexion to 90 1st 3 wks      Manuals 3/15 3/19 3/24 3/26 3/29 4/2 4/5 4/7 4/12 4/14   Patella mobs mb mo PROM mb mo  PROM mb  mb mb  mb                                          Neuro Re-Ed             Quad set IP Review  3"x30 30 30         Russian stim 10/30   10' 10' 10'  W/slr 10'' 412s 12'  10' SLR  10' slr   biodex catch     4' L12 4' L12 med 4' L12 med 4' static 4' L12 4' L12   SLB        3'                               Ther Ex             SLR in brace IP AA flex,  abd,ext 2x10  ea Flx, abd, ext 30 ea Flx, abd, ext  30ea No brace x30  E-stim 10' estim 12' estim    Leg press      1 pl 2x30 1 pl 2x30 2 pl 2x30 3 pl 3x10 3p 3x10   Step ups      6" x30  6"x30  6"x30   Ankle tband  Blue  2x10ea  Issued  Black         Standing ham curl   3x10 3x10 30 30       Heel raises   3x10 3x10 30 30 nv 30 30 Single 3x10   squats     30 on biodex 30 biodex 30 biodex L11  nv 30   Ham curls     2 pl x30 2pl 3x10 2 pl 3x10 3 pl x30 3 pl x30 3 pl x30   TKE     sony 2x30 sony 2x30 sony 2x30 Blue 2x30     bridges         10s 2x10 10s 2x10   Ther Activity             bike         bike 10'   stepper         5' 5'   Gait Training             Step over cone  12' x6 12'x6 12x6 x8 Frwd, side 3' ea       stairs      x3 x4  x1    Side step       nv  nv    TM retro       5' 5' 5'    Modalities

## 2021-04-16 ENCOUNTER — OFFICE VISIT (OUTPATIENT)
Dept: PHYSICAL THERAPY | Facility: CLINIC | Age: 41
End: 2021-04-16
Payer: COMMERCIAL

## 2021-04-16 DIAGNOSIS — S89.92XA INJURY OF LEFT KNEE, INITIAL ENCOUNTER: ICD-10-CM

## 2021-04-16 DIAGNOSIS — S83.512D RUPTURE OF ANTERIOR CRUCIATE LIGAMENT OF LEFT KNEE, SUBSEQUENT ENCOUNTER: Primary | ICD-10-CM

## 2021-04-16 PROCEDURE — 97112 NEUROMUSCULAR REEDUCATION: CPT | Performed by: PHYSICAL THERAPIST

## 2021-04-16 PROCEDURE — 97530 THERAPEUTIC ACTIVITIES: CPT | Performed by: PHYSICAL THERAPIST

## 2021-04-16 PROCEDURE — 97110 THERAPEUTIC EXERCISES: CPT | Performed by: PHYSICAL THERAPIST

## 2021-04-16 NOTE — PROGRESS NOTES
Daily Note     Today's date: 2021  Patient name: Yane Florence  : 1980  MRN: [id number]  Referring provider: Caroline Renee MD  Dx:   Encounter Diagnosis     ICD-10-CM    1  Rupture of anterior cruciate ligament of left knee, subsequent encounter  S83 512D    2  Injury of left knee, initial encounter  S89  92XA                   Subjective: no new complaints      Objective: See treatment diary below      Assessment: Tolerated treatment well  Patient demonstrated fatigue post treatment      Plan: Continue per plan of care        Precautions: ACL 3/8 PTB autograft, meniscus repair; limit flexion to 90 1st 3 wks      Manuals 3/15 3/19 3/24 3/26 3/29 4/2 4/5 4/7 4/12 4/14 4/16   Patella mobs mb mo PROM mb mo  PROM mb  mb mb  mb nv                                             Neuro Re-Ed              Quad set IP Review  3"x30 30 30          Russian stim 10/30   10' 10' 10'  W/slr 10'' s 12'  10' SLR  10' slr  10' slr   biodex catch     4' L12 4' L12 med 4' L12 med 4' static 4' L12 4' L12 4' L12   SLB        3'                                  Ther Ex              SLR in brace IP AA flex,  abd,ext 2x10  ea Flx, abd, ext 30 ea Flx, abd, ext  30ea No brace x30  E-stim 10' estim 12' estim     Leg press      1 pl 2x30 1 pl 2x30 2 pl 2x30 3 pl 3x10 3p 3x10 3 pl 3x10   Step ups      6" x30  6"x30  6"x30 6"x30   Ankle tband  Blue  2x10ea  Issued  Black          Standing ham curl   3x10 3x10 30 30        Heel raises   3x10 3x10 30 30 nv 30 30 Single 3x10 Single 3x10   squats     30 on biodex 30 biodex 30 biodex L11  nv 30 30   Ham curls     2 pl x30 2pl 3x10 2 pl 3x10 3 pl x30 3 pl x30 3 pl x30 3 pl x30   TKE     sony 2x30 sony 2x30 sony 2x30 Blue 2x30      bridges         10s 2x10 10s 2x10    Ther Activity              bike         bike 10' 10'   stepper         5' 5' 5'   Gait Training              Step over cone  12' x6 12'x6 12'x'x8 Frwd, side 3' ea        stairs      x3 x4  x1  x1   Side step       nv  nv     TM retro       5' 5' 5'  5'   Modalities

## 2021-04-19 ENCOUNTER — OFFICE VISIT (OUTPATIENT)
Dept: PHYSICAL THERAPY | Facility: CLINIC | Age: 41
End: 2021-04-19
Payer: COMMERCIAL

## 2021-04-19 DIAGNOSIS — S89.92XA INJURY OF LEFT KNEE, INITIAL ENCOUNTER: ICD-10-CM

## 2021-04-19 DIAGNOSIS — S83.512D RUPTURE OF ANTERIOR CRUCIATE LIGAMENT OF LEFT KNEE, SUBSEQUENT ENCOUNTER: Primary | ICD-10-CM

## 2021-04-19 PROCEDURE — 97530 THERAPEUTIC ACTIVITIES: CPT | Performed by: PHYSICAL THERAPIST

## 2021-04-19 PROCEDURE — 97112 NEUROMUSCULAR REEDUCATION: CPT | Performed by: PHYSICAL THERAPIST

## 2021-04-19 PROCEDURE — 97110 THERAPEUTIC EXERCISES: CPT | Performed by: PHYSICAL THERAPIST

## 2021-04-19 NOTE — PROGRESS NOTES
Daily Note     Today's date: 2021  Patient name: Magali Coleman  : 1980  MRN: [id number]  Referring provider: Jm Montero MD  Dx:   Encounter Diagnosis     ICD-10-CM    1  Rupture of anterior cruciate ligament of left knee, subsequent encounter  S83 512D    2  Injury of left knee, initial encounter  S89  92XA                   Subjective: pt reports that he backed off of exs this weekend and feels better as a result      Objective: See treatment diary below      Assessment: Tolerated treatment well  Patient demonstrated fatigue post treatment      Plan: Continue per plan of care        Precautions: ACL 3/8 PTB autograft, meniscus repair; limit flexion to 90 1st 3 wks      Manuals    Patella mobjeb warren          nv                                             Neuro Re-Ed              [place] stim 10/30 4/12 w slr           10' slr   biodex catch 4' L12          4' L12   SLB                                          Ther Ex              Leg press 3 pl 3x10          3 pl 3x10   Step ups 6"x30          6"x30   Ankle tband              Standing ham curl              Heel raises sinle 3x10          Single 3x10   squats 30          30   Ham curls 3 pl x30          3 pl x30   Dead lifts 30             bridges              Ther Activity              bike 10'          10'   stepper 10'          5'   Gait Training              Step over cone              stairs           x1   Side step Red 3'             TM retro           5'   Modalities

## 2021-04-20 ENCOUNTER — OFFICE VISIT (OUTPATIENT)
Dept: PHYSICAL THERAPY | Facility: CLINIC | Age: 41
End: 2021-04-20
Payer: COMMERCIAL

## 2021-04-20 DIAGNOSIS — H81.12 BPPV (BENIGN PAROXYSMAL POSITIONAL VERTIGO), LEFT: Primary | ICD-10-CM

## 2021-04-20 PROCEDURE — 95992 CANALITH REPOSITIONING PROC: CPT | Performed by: PHYSICAL THERAPIST

## 2021-04-20 PROCEDURE — 97161 PT EVAL LOW COMPLEX 20 MIN: CPT | Performed by: PHYSICAL THERAPIST

## 2021-04-20 NOTE — PROGRESS NOTES
PT Evaluation     Today's date: 2021  Patient name: Shawn Beverly  : 1980  MRN: [id number]  Referring provider: Claudell Back, PT  Dx:   Encounter Diagnosis     ICD-10-CM    1  BPPV (benign paroxysmal positional vertigo), left  H81 12                   Assessment  Assessment details: Pt is a 38 yo male with complaint of vertigo presenting with signs and symptoms of left posterior canalithiasis  He was treated with canalith repositioning maneuver at the time of initial evaluation with symptoms largely resolved by second trial  A follow up appointment in one week was scheduled with pt instructed to cancel if he was feeling 100% by then  He also demonstrates slight convergence insufficiency, however, does not report difficulty with reading or other close work  Instructed pt in two convergence exercises for home exercises and to follow up if he starts to notice any problems with reading or close work  Functional limitations: slow with functional transfers  Goals  STG - 1 session  1  Test negative for left Alexia Bhavesh to indicate cleared L BPPV  2  Comply with after session instructions for BPPV  LTG - 3 sessions  1  Tolerate all functional transfers without onset of dizziness to allow resuming PLOF  2  Tolerate C/S AROM all planes without onset of dizziness to allow resuming PLOF  Plan  Plan details: May require up to 4 visits  Provided with and reviewed initial written HEP  Reviewed physical exam findings and plan of care  All questions answered to patient's satisfaction      Patient would benefit from: skilled physical therapy  Planned therapy interventions: canalith repositioning, patient education and home exercise program  Duration in visits: 4  Treatment plan discussed with: patient        Subjective Evaluation    History of Present Illness  Date of onset: 2021  Mechanism of injury: Pt reports a ski accident in which he sustained a concussion with a loss of consciousness for 5-10 minutes and a brief window of amnesia  He complains of vertigo with quick movements lasting seconds  He arrives today via direct access for OPPT evaluation  He is currently being treated by another PT at this location for his knee       PMH significant for pre-hypertension  Pain  No pain reported    Social Support    Employment status: working ()    Diagnostic Tests  CT scan: normal  Treatments  No previous or current treatments  Patient Goals  Patient goal: eliminate vertigo        Objective         Dysequilibrium: No  Lightheadedness: No  Vertigo: Yes  Rocking or Swaying: No         Oscillopsia: No  Diplopia: Yes (blurred)  Motion sickness: No  Floating, Swimming, Disconnected: No    Exacerbation Factors:  Bending over: No  Turning Head: No  Rolling in bed: No  Walking: No  Looking up: No  Supine to/from sitting: Yes  Optokinetic movement: No  Walking in busy environment: No    Duration of Symptoms: seconds     Concurrent Complaints:  Tinnitus:Yes intermittent lasting 2-3 hours; pre-existing  Aural Fullness:No  Known hearing loss:No  Nausea, Vomiting: No  Altered Vision: Yes intermittent blurred   Poor Concentration: No  Memory Loss: No  Peripheral Neuropathy:No  Cervical Pain: No   Headache: No      PHYSICAL FINDINGS:  Oculomotor ROM : WFL  Resting nystagmus: No  Gaze holding nystagmus No   Smooth pursuit Normal    Vertical Saccades:Normal  Horizontal Saccades:Normal  Convergence: Abnormal 15cm, 10cm, 12cm    Cover/Uncover/Crosscover Test: Abnormal slight right exophoria    Head thrust (room light): Normal      Positional testing: Right Left   Joelle [place] pike normal upbeating nystagmus & vertigo   Roll test: NT NT                    Precautions: s/p ACL repair      Manuals 4/20            L Epley + education x2                                                   Neuro Re-Ed             [employer] demo            Smile card demo Ther Ex                                                                                                                     Ther Activity                                       Gait Training                                       Modalities

## 2021-04-21 ENCOUNTER — OFFICE VISIT (OUTPATIENT)
Dept: PHYSICAL THERAPY | Facility: CLINIC | Age: 41
End: 2021-04-21
Payer: COMMERCIAL

## 2021-04-21 DIAGNOSIS — S83.512D RUPTURE OF ANTERIOR CRUCIATE LIGAMENT OF LEFT KNEE, SUBSEQUENT ENCOUNTER: Primary | ICD-10-CM

## 2021-04-21 DIAGNOSIS — S89.92XA INJURY OF LEFT KNEE, INITIAL ENCOUNTER: ICD-10-CM

## 2021-04-21 PROCEDURE — 97110 THERAPEUTIC EXERCISES: CPT | Performed by: PHYSICAL THERAPIST

## 2021-04-21 PROCEDURE — 97112 NEUROMUSCULAR REEDUCATION: CPT | Performed by: PHYSICAL THERAPIST

## 2021-04-21 PROCEDURE — 97530 THERAPEUTIC ACTIVITIES: CPT | Performed by: PHYSICAL THERAPIST

## 2021-04-21 NOTE — PROGRESS NOTES
Daily Note     Today's date: 2021  Patient name: Jeremy Lindquist  : 1980  MRN: [id number]  Referring provider: Amrik Chase MD  Dx:   Encounter Diagnosis     ICD-10-CM    1  Rupture of anterior cruciate ligament of left knee, subsequent encounter  S83 512D    2  Injury of left knee, initial encounter  S89  92XA                   Subjective: knee is feeling ok    Objective: See treatment diary below      Assessment: Tolerated treatment well  Patient demonstrated fatigue post treatment      Plan: Continue per plan of care        Precautions: ACL 3/8 PTB autograft, meniscus repair; limit flexion to 90 1st 3 wks      Manuals    Patella mobs mb mb         nv                                             Neuro Re-Ed              [place] stim 10/30 4/12 w slr 10' slr          10' slr   biodex catch 4' L12          4' L12   SLB  Ball toss x30            Foam tandem                            Ther Ex              Leg press 3 pl 3x10 4 pl 3x10         3 pl 3x10   Step ups 6"x30 6"x30         6"x30   Ankle tband                            Heel raises single 3x10 Single 3x10         Single 3x10   squats 30 10s  x20         30   Ham curls 3 pl x30 4 pl 3x10         3 pl x30   Dead lifts 30 30            bridges              Ther Activity              bike 10' 10         10'   stepper 10' 10         5'                 Step over cone              stairs           x1   Side step Red 3' Blue 3'            TM retro           5'   planks              Modalities

## 2021-04-23 ENCOUNTER — OFFICE VISIT (OUTPATIENT)
Dept: PHYSICAL THERAPY | Facility: CLINIC | Age: 41
End: 2021-04-23
Payer: COMMERCIAL

## 2021-04-23 DIAGNOSIS — S83.512D RUPTURE OF ANTERIOR CRUCIATE LIGAMENT OF LEFT KNEE, SUBSEQUENT ENCOUNTER: Primary | ICD-10-CM

## 2021-04-23 DIAGNOSIS — S89.92XA INJURY OF LEFT KNEE, INITIAL ENCOUNTER: ICD-10-CM

## 2021-04-23 PROCEDURE — 97530 THERAPEUTIC ACTIVITIES: CPT | Performed by: PHYSICAL THERAPIST

## 2021-04-23 PROCEDURE — 97110 THERAPEUTIC EXERCISES: CPT | Performed by: PHYSICAL THERAPIST

## 2021-04-23 PROCEDURE — 97112 NEUROMUSCULAR REEDUCATION: CPT | Performed by: PHYSICAL THERAPIST

## 2021-04-23 NOTE — PROGRESS NOTES
Daily Note     Today's date: 2021  Patient name: Magali Coleman  : 1980  MRN: [id number]  Referring provider: Jm Montero MD  Dx:   Encounter Diagnosis     ICD-10-CM    1  Rupture of anterior cruciate ligament of left knee, subsequent encounter  S83 512D    2  Injury of left knee, initial encounter  S89  92XA                   Subjective: continued progress      Objective: See treatment diary below      Assessment: Tolerated treatment well  Patient demonstrated fatigue post treatment      Plan: Continue per plan of care        Precautions: ACL 3/8 PTB autograft, meniscus repair; limit flexion to 90 1st 3 wks      Manuals    Patella mobs mb mb         nv                                             Neuro Re-Ed              [place] stim 10/30 4/12 w slr 10' slr 10' slr         10' slr   biodex catch 4' L12          4' L12   SLB  Ball toss x30 Ball toss x30           Foam tandem   x6                         Ther Ex              Leg press 3 pl 3x10 4 pl 3x10 4 pl 3x10        3 pl 3x10   Step ups 6"x30 6"x30         6"x30   Ankle tband                            Heel raises single 3x10 Single 3x10 Single 3x10        Single 3x10   squats 30 10s  x20 10sx20        30   Ham curls 3 pl x30 4 pl 3x10 4 pl 3x10        3 pl x30   Dead lifts 30 30 10# dbls           bridges              Ther Activity              bike 10' 10 10        10'   stepper 10' 10 10        5'                 Step over cone              stairs           x1   Side step Red 3' Blue 3' Blue 3'           TM retro           5'   planks              Modalities

## 2021-04-26 ENCOUNTER — OFFICE VISIT (OUTPATIENT)
Dept: PHYSICAL THERAPY | Facility: CLINIC | Age: 41
End: 2021-04-26
Payer: COMMERCIAL

## 2021-04-26 DIAGNOSIS — S89.92XA INJURY OF LEFT KNEE, INITIAL ENCOUNTER: ICD-10-CM

## 2021-04-26 DIAGNOSIS — H81.12 BPPV (BENIGN PAROXYSMAL POSITIONAL VERTIGO), LEFT: ICD-10-CM

## 2021-04-26 DIAGNOSIS — S83.512D RUPTURE OF ANTERIOR CRUCIATE LIGAMENT OF LEFT KNEE, SUBSEQUENT ENCOUNTER: Primary | ICD-10-CM

## 2021-04-26 PROCEDURE — 95992 CANALITH REPOSITIONING PROC: CPT | Performed by: PHYSICAL THERAPIST

## 2021-04-26 PROCEDURE — 97110 THERAPEUTIC EXERCISES: CPT | Performed by: PHYSICAL THERAPIST

## 2021-04-26 PROCEDURE — 97140 MANUAL THERAPY 1/> REGIONS: CPT | Performed by: PHYSICAL THERAPIST

## 2021-04-26 PROCEDURE — 97112 NEUROMUSCULAR REEDUCATION: CPT | Performed by: PHYSICAL THERAPIST

## 2021-04-26 NOTE — PROGRESS NOTES
Daily Note     Today's date: 2021  Patient name: Mila Stone  : 1980  MRN: [id number]  Referring provider: Chaya Kanner, MD  Dx:   Encounter Diagnosis     ICD-10-CM    1  Rupture of anterior cruciate ligament of left knee, subsequent encounter  S83 512D    2  Injury of left knee, initial encounter  S89  92XA                   Subjective: reports continued improvement      Objective: See treatment diary below      Assessment: Tolerated treatment well  Patient demonstrated fatigue post treatment      Plan: Continue per plan of care        Precautions: ACL 3/8 PTB autograft, meniscus repair; limit flexion to 90 1st 3 wks      Manuals    Patella mobs mb mb  mb       nv                 BPPV recheck    KT          Convergence recheck    KT          Neuro Re-Ed              [place] stim 10/30 4/12 w slr 10' slr 10' slr         10' slr   biodex catch 4' L12          4' L12   SLB - ball toss  Ball toss x30 Ball toss x30 30          Foam tandem   x6 x6                        Ther Ex              Leg press 3 pl 3x10 4 pl 3x10 4 pl 3x10 4 pl 3x10       3 pl 3x10   Step ups 6"x30 6"x30  6"x30       6"x30   Ankle tband                            Heel raises single 3x10 Single 3x10 Single 3x10 Single 3x10       Single 3x10   squats 30 10s  x20 10sx20 Single 30       30   Ham curls 3 pl x30 4 pl 3x10 4 pl 3x10 4 pl 3x10       3 pl x30   Dead lifts 30 30 10# dbls 10# dbls 30          /          lunges              Ther Activity              bike 10' 10 10 10       10'   stepper 10' 10 10 10       5'                 Step over cone              stairs    x3       x1   Side step Red 3' Blue 3' Blue 3' sony 3'          TM retro           5'   planks    x10          Modalities

## 2021-04-26 NOTE — PROGRESS NOTES
Daily Note     Today's date: 2021  Patient name: Ronda Valverde  : 1980  MRN: [id number]  Referring provider: Martha Moy, PT  Dx:   Encounter Diagnosis     ICD-10-CM    1  BPPV (benign paroxysmal positional vertigo), left  H81 12                   Subjective: Pt reports feeling a little dizzy and woozy for two days after BPPV treatment but has been normal since then  He does also describe a feeling of fatigue when performing the pencil pushups  Objective: See treatment diary below      Assessment: Tolerated treatment well  Patient assessed as follows:  THE [facility] = normal bilaterally  Horizontal Roll Test = normal bilaterally  Convergence = 7cm (normal)    Reviewed post BPPV expectations  Reviewed pencil pushup and daisha string exercises  Recommended evaluation by vision therapist if convergence difficulties persist      Plan: Follow up if vertigo returns       Precautions: ACL 3/8 PTB autograft, meniscus repair; limit flexion to 90 1st 3 wks      Manuals    Patella mobs mb mb         nv                 BPPV recheck    KT          Convergence recheck    KT          Neuro Re-Ed              Russian stim 10/30 4/12 w slr 10' slr 10' slr         10' slr   biodex catch 4' L12          4' L12   SLB  Ball toss x30 Ball toss x30           Foam tandem   x6                         Ther Ex              Leg press 3 pl 3x10 4 pl 3x10 4 pl 3x10        3 pl 3x10   Step ups 6"x30 6"x30         6"x30   Ankle tband                            Heel raises single 3x10 Single 3x10 Single 3x10        Single 3x10   squats 30 10s  x20 10sx20        30   Ham curls 3 pl x30 4 pl 3x10 4 pl 3x10        3 pl x30   Dead lifts 30 30 10# dbls           bridges              Ther Activity              bike 10' 10 10        10'   stepper 10' 10 10        5'                 Step over cone              stairs           x1   Side step Red 3' Blue 3' Blue 3'           TM retro           5' planks              Modalities

## 2021-04-28 ENCOUNTER — OFFICE VISIT (OUTPATIENT)
Dept: PHYSICAL THERAPY | Facility: CLINIC | Age: 41
End: 2021-04-28
Payer: COMMERCIAL

## 2021-04-28 DIAGNOSIS — S89.92XA INJURY OF LEFT KNEE, INITIAL ENCOUNTER: ICD-10-CM

## 2021-04-28 DIAGNOSIS — S83.512D RUPTURE OF ANTERIOR CRUCIATE LIGAMENT OF LEFT KNEE, SUBSEQUENT ENCOUNTER: Primary | ICD-10-CM

## 2021-04-28 PROCEDURE — 97112 NEUROMUSCULAR REEDUCATION: CPT | Performed by: PHYSICAL THERAPIST

## 2021-04-28 PROCEDURE — 97110 THERAPEUTIC EXERCISES: CPT | Performed by: PHYSICAL THERAPIST

## 2021-04-28 NOTE — PROGRESS NOTES
Daily Note     Today's date: 2021  Patient name: Neftali Anderson  : 1980  MRN: [id number]  Referring provider: Danae Sutherland MD  Dx:   Encounter Diagnosis     ICD-10-CM    1  Rupture of anterior cruciate ligament of left knee, subsequent encounter  S83 512D    2  Injury of left knee, initial encounter  S89  92XA                   Subjective: knee is feeling ok      Objective: See treatment diary below      Assessment: Tolerated treatment well  Patient demonstrated fatigue post treatment      Plan: Continue per plan of care        Precautions: ACL 3/8 PTB autograft, meniscus repair; limit flexion to 90 1st 3 wks      Manuals    Patella mobs mb mb  mb       nv                 BPPV recheck    KT          Convergence recheck    KT          Neuro Re-Ed              Russian stim 10/30 4/12 w slr 10' slr 10' slr         10' slr   biodex catch 4' L12          4' L12   SLB - ball toss  Ball toss x30 Ball toss x30 30 30         Foam tandem   x6 x6 x8                       Ther Ex              Leg press 3 pl 3x10 4 pl 3x10 4 pl 3x10 4 pl 3x10 5 pl  3x10      3 pl 3x10   Step ups 6"x30 6"x30  6"x30 8"      6"x30   Ankle tband                            Heel raises single 3x10 Single 3x10 Single 3x10 Single 3x10       Single 3x10   squats 30 10s  x20 10sx20 Single 30       30   Ham curls 3 pl x30 4 pl 3x10 4 pl 3x10 4 pl 3x10 5 pl 3x10      3 pl x30   Dead lifts 30 30 10# dbls 10# dbls 30 15 dbls         bridges    /          Lunges; frwd, lat     10 ea         Ther Activity              bike 10' 10 10 10 10      10'   stepper 10' 10 10 10 10      5'                 Step over cone              stairs    x3       x1   Side step Red 3' Blue 3' Blue 3' sony 3' Blue 3'         TM retro           5'   planks    x10          Modalities

## 2021-04-30 ENCOUNTER — OFFICE VISIT (OUTPATIENT)
Dept: PHYSICAL THERAPY | Facility: CLINIC | Age: 41
End: 2021-04-30
Payer: COMMERCIAL

## 2021-04-30 DIAGNOSIS — S83.512D RUPTURE OF ANTERIOR CRUCIATE LIGAMENT OF LEFT KNEE, SUBSEQUENT ENCOUNTER: Primary | ICD-10-CM

## 2021-04-30 DIAGNOSIS — S89.92XA INJURY OF LEFT KNEE, INITIAL ENCOUNTER: ICD-10-CM

## 2021-04-30 PROCEDURE — 97112 NEUROMUSCULAR REEDUCATION: CPT | Performed by: PHYSICAL THERAPIST

## 2021-04-30 PROCEDURE — 97110 THERAPEUTIC EXERCISES: CPT | Performed by: PHYSICAL THERAPIST

## 2021-04-30 NOTE — PROGRESS NOTES
Daily Note     Today's date: 2021  Patient name: Yane Florence  : 1980  MRN: [id number]  Referring provider: Caroline Renee MD  Dx:   Encounter Diagnosis     ICD-10-CM    1  Rupture of anterior cruciate ligament of left knee, subsequent encounter  S83 512D    2  Injury of left knee, initial encounter  S89  92XA                   Subjective: knee is feeling good      Objective: See treatment diary below      Assessment: Tolerated treatment well  Patient demonstrated fatigue post treatment      Plan: Continue per plan of care        Precautions: ACL 3/8 PTB autograft, meniscus repair; limit flexion to 90 1st 3 wks      Manuals    Patella mobs mb mb  mb       nv                 BPPV recheck    KT          Convergence recheck    KT          Neuro Re-Ed              Russian stim 10/30 4/12 w slr 10' slr 10' slr         10' slr   biodex catch 4' L12          4' L12   SLB - ball toss  Ball toss x30 Ball toss x30 30 30 30        Foam tandem   x6 x6 x8 x8                      Ther Ex              Leg press 3 pl 3x10 4 pl 3x10 4 pl 3x10 4 pl 3x10 5 pl  3x10 5 pl 3x10     3 pl 3x10   Step ups 6"x30 6"x30  6"x30 8" 8"x30     6"x30   Ankle tband                            Heel raises single 3x10 Single 3x10 Single 3x10 Single 3x10  Single 3x10     Single 3x10   squats 30 10s  x20 10sx20 Single 30  30     30   Ham curls 3 pl x30 4 pl 3x10 4 pl 3x10 4 pl 3x10 5 pl 3x10 5 pl 3x10     3 pl x30   Dead lifts 30 30 10# dbls 10# dbls 30 15 dbls 15 # dbls 30        bridges    /  30        Lunges; frwd, lat     10 ea 10 ea        Ther Activity              bike 10' 10 10 10 10 10     10'   stepper 10' 10 10 10 10 10     5'                 Step over cone              stairs    x3       x1   Side step Red 3' Blue 3' Blue 3' devin 3' Blue 3' Devin 3'        TM retro           5'   planks    x10 10 10        Modalities

## 2021-05-03 ENCOUNTER — APPOINTMENT (OUTPATIENT)
Dept: PHYSICAL THERAPY | Facility: CLINIC | Age: 41
End: 2021-05-03
Payer: COMMERCIAL

## 2021-05-05 ENCOUNTER — OFFICE VISIT (OUTPATIENT)
Dept: PHYSICAL THERAPY | Facility: CLINIC | Age: 41
End: 2021-05-05
Payer: COMMERCIAL

## 2021-05-05 DIAGNOSIS — S83.512D RUPTURE OF ANTERIOR CRUCIATE LIGAMENT OF LEFT KNEE, SUBSEQUENT ENCOUNTER: Primary | ICD-10-CM

## 2021-05-05 DIAGNOSIS — S89.92XA INJURY OF LEFT KNEE, INITIAL ENCOUNTER: ICD-10-CM

## 2021-05-05 PROCEDURE — 97112 NEUROMUSCULAR REEDUCATION: CPT | Performed by: PHYSICAL THERAPIST

## 2021-05-05 PROCEDURE — 97110 THERAPEUTIC EXERCISES: CPT | Performed by: PHYSICAL THERAPIST

## 2021-05-05 NOTE — PROGRESS NOTES
Daily Note     Today's date: 2021  Patient name: Suly Lei  : 1980  MRN: [id number]  Referring provider: Bryn Gates MD  Dx:   Encounter Diagnosis     ICD-10-CM    1  Rupture of anterior cruciate ligament of left knee, subsequent encounter  S83 512D    2  Injury of left knee, initial encounter  S89  92XA                   Subjective: knee is feeling pretty good      Objective: See treatment diary below      Assessment: Tolerated treatment well  Patient demonstrated fatigue post treatment      Plan: Continue per plan of care        Precautions: ACL 3/8 PTB autograft, meniscus repair; limit flexion to 90 1st 3 wks      Manuals    Patella mobs mb mb  mb       nv                 BPPV recheck    KT          Convergence recheck    KT          Neuro Re-Ed              Russian stim 10/30 4/12 w slr 10' slr 10' slr         10' slr   biodex catch 4' L12          4' L12   SLB - ball toss  Ball toss x30 Ball toss x30 30 30 30 30       Foam tandem   x6 x6 x8 x8 x8                     Ther Ex              Leg press 3 pl 3x10 4 pl 3x10 4 pl 3x10 4 pl 3x10 5 pl  3x10 5 pl 3x10 5 pl 3x10    3 pl 3x10   Step ups 6"x30 6"x30  6"x30 8" 8"x30 8"x30    6"x30   Ankle tband                            Heel raises single 3x10 Single 3x10 Single 3x10 Single 3x10  Single 3x10 Single 3x10    Single 3x10   squats 30 10s  x20 10sx20 Single 30  30 Rocker board    30   Ham curls 3 pl x30 4 pl 3x10 4 pl 3x10 4 pl 3x10 5 pl 3x10 5 pl 3x10 5 pl 3x10    3 pl x30   Dead lifts 30 30 10# dbls 10# dbls 30 15 dbls 15 # dbls 30 15# dbls 30       /  30 30       Lunges; frwd, lat     10 ea 10 ea 10       Ther Activity              bike 10' 10 10 10 10 10 10'    10'   stepper 10' 10 10 10 10 10 10'    5'                 Step over cone              stairs    x3       x1   Side step Red 3' Blue 3' Blue 3' sony 3' Blue 3' Sony 3' sony 3'       TM retro           5'   planks    x10 10 10 10       Modalities

## 2021-05-07 ENCOUNTER — OFFICE VISIT (OUTPATIENT)
Dept: PHYSICAL THERAPY | Facility: CLINIC | Age: 41
End: 2021-05-07
Payer: COMMERCIAL

## 2021-05-07 DIAGNOSIS — S89.92XA INJURY OF LEFT KNEE, INITIAL ENCOUNTER: ICD-10-CM

## 2021-05-07 DIAGNOSIS — S83.512D RUPTURE OF ANTERIOR CRUCIATE LIGAMENT OF LEFT KNEE, SUBSEQUENT ENCOUNTER: Primary | ICD-10-CM

## 2021-05-07 PROCEDURE — 97112 NEUROMUSCULAR REEDUCATION: CPT | Performed by: PHYSICAL THERAPIST

## 2021-05-07 PROCEDURE — 97110 THERAPEUTIC EXERCISES: CPT | Performed by: PHYSICAL THERAPIST

## 2021-05-07 NOTE — PROGRESS NOTES
Daily Note     Today's date: 2021  Patient name: Ramo Penny  : 1980  MRN: [id number]  Referring provider: Samantha Kidd MD  Dx:   Encounter Diagnosis     ICD-10-CM    1  Rupture of anterior cruciate ligament of left knee, subsequent encounter  S83 512D    2  Injury of left knee, initial encounter  S89  92XA                   Subjective: reports that knee is feeling pretty good, only mild discomfort      Objective: See treatment diary below      Assessment: Tolerated treatment well  Patient demonstrated fatigue post treatment      Plan: Continue per plan of care        Precautions: ACL 3/8 PTB autograft, meniscus repair; limit flexion to 90 1st 3 wks      Manuals       Patella mobs mb mb  mb                        BPPV recheck    KT          Convergence recheck    KT          Neuro Re-Ed              Russian stim 10/30 4/12 w slr 10' slr 10' slr           biodex catch 4' L12             SLB - ball toss  Ball toss x30 Ball toss x30 30 30 30 30 30      Foam tandem   x6 x6 x8 x8 x8 x8                    Ther Ex              Knee ext 90-45        nv      Leg press 3 pl 3x10 4 pl 3x10 4 pl 3x10 4 pl 3x10 5 pl  3x10 5 pl 3x10 5 pl 3x10 5pl x30      Step ups 6"x30 6"x30  6"x30 8" 8"x30 8"x30 8"x30      Ankle tband                            Heel  Raises single single 3x10 Single 3x10 Single 3x10 Single 3x10  Single 3x10 Single 3x10 3x10      squats 30 10s  x20 10sx20 Single 30  30 Rocker board rocker x30      Ham curls 3 pl x30 4 pl 3x10 4 pl 3x10 4 pl 3x10 5 pl 3x10 5 pl 3x10 5 pl 3x10 5plx20, 6 pl x10      Dead lifts 30 30 10# dbls 10# dbls 30 15 dbls 15 # dbls 30 15# dbls 30 15# dmbls 30      bridges    /  30 30 30      Lunges; frwd, lat     10 ea 10 ea 10 30 ea      Ther Activity              bike 10' 10 10 10 10 10 10' 10      stepper 10' 10 10 10 10 10 10' 5                    Step over cone              stairs    x3          Side step Red 3' Blue 3' Blue 3' devin 3' Blue 3' Devin 3' devin 3' Blue 3'      TM retro              planks    x10 10 10 10 10      Modalities

## 2021-05-10 ENCOUNTER — APPOINTMENT (OUTPATIENT)
Dept: PHYSICAL THERAPY | Facility: CLINIC | Age: 41
End: 2021-05-10
Payer: COMMERCIAL

## 2021-05-12 ENCOUNTER — APPOINTMENT (OUTPATIENT)
Dept: PHYSICAL THERAPY | Facility: CLINIC | Age: 41
End: 2021-05-12
Payer: COMMERCIAL

## 2021-05-14 ENCOUNTER — OFFICE VISIT (OUTPATIENT)
Dept: OBGYN CLINIC | Facility: MEDICAL CENTER | Age: 41
End: 2021-05-14

## 2021-05-14 VITALS
HEIGHT: 72 IN | BODY MASS INDEX: 35.11 KG/M2 | WEIGHT: 259.2 LBS | HEART RATE: 80 BPM | SYSTOLIC BLOOD PRESSURE: 149 MMHG | DIASTOLIC BLOOD PRESSURE: 101 MMHG

## 2021-05-14 DIAGNOSIS — S83.512A RUPTURE OF ANTERIOR CRUCIATE LIGAMENT OF LEFT KNEE, INITIAL ENCOUNTER: Primary | ICD-10-CM

## 2021-05-14 PROCEDURE — 99024 POSTOP FOLLOW-UP VISIT: CPT | Performed by: ORTHOPAEDIC SURGERY

## 2021-05-14 NOTE — PROGRESS NOTES
Orthopaedic Surgery - Office Note  Kori Green (37 y o  male)   : 1980   MRN: [id number]  Encounter Date: 2021    Chief Complaint   Patient presents with    Left Knee - Follow-up       Assessment / Plan  S/p left knee arthroscopy with ACL reconstruction using patella tendon autograft and medial meniscus repair on 3/08/21     · Continue with outpatient physical therapy following ACL reconstruction protocol with patella tendon autograph modification  · Continue with ice and analgesics as needed  · He can swim but avoid kicking at this time, feet on the ground  Return in about 2 months (around 2021)  History of Present Illness  Kori Green is a 37 y o  male who presents for follow up S/p left knee arthroscopy with ACL reconstruction using patella tendon autograft and medial meniscus repair on 3/08/21  He doesn't have any swelling and only some anterior knee soreness around the patella tendon  He denies any recent pain or the use of pain medication  Overall, he continues to be happy with his progress and denies any radiating symptoms  Review of Systems  Pertinent items are noted in HPI  All other systems were reviewed and are negative  Physical Exam  BP (!) 149/101   Pulse 80   Ht 6' (1 829 m)   Wt 118 kg (259 lb 3 2 oz)   BMI 35 15 kg/m²   Cons: Appears well  No apparent distress  Psych: Alert  Oriented x3  Mood and affect normal   Eyes: PERRLA, EOMI  Resp: Normal effort  No audible wheezing or stridor  CV: Palpable pulse  No discernable arrhythmia  No LE edema  Lymph:  No palpable cervical, axillary, or inguinal lymphadenopathy  Skin: Warm  No palpable masses  No visible lesions  Neuro: Normal muscle tone  Normal and symmetric DTR's  Left Knee Exam  Alignment:  Normal knee alignment  Inspection:  mild swelling  No edema  No erythema  No ecchymosis  Incision clean and dry  Palpation:  No tenderness  No effusion  ROM:  Knee Extension 0   Knee Flexion 120   Strength:  Able to SLR without lag  Able to actively extend knee against gravity  Stability:  No objective knee instability  Stable Varus / Valgus stress, Lachman, and Posterior drawer  Tests:  No pertinent positive or negative tests  Patella:  Normal patellar mobility  Neurovascular:  Sensation intact in DP/SP/Conley/Sa/T nerve distributions  2+ DP & PT pulses  Gait:  Normal     Studies Reviewed  No studies to review    Procedures  No procedures today  Medical, Surgical, Family, and Social History  The patient's medical history, family history, and social history, were reviewed and updated as appropriate  Past Medical History:   Diagnosis Date    Concussion     unsure of LOC- skiing accident 2/21/21    Wears contact lenses        Past Surgical History:   Procedure Laterality Date    EGD      ORIF TIBIA & FIBULA FRACTURES Right     right ankle    FL KNEE SCOPE,AID ANT CRUCIATE REPAIR Left 3/8/2021    Procedure: ARTHROSCOPIC RECONSTRUCTION ANTERIOR CRUCIATE LIGAMENT (ACL) WITH AUTOGRAFT (patellar tendon); MENISCUS REPAIR;  Surgeon: Sisi Serrato MD;  [facility];  Service: Orthopedics    TIBIA FRACTURE SURGERY         History reviewed  No pertinent family history      Social History     Occupational History    Not on file   Tobacco Use    Smoking status: Never Smoker    Smokeless tobacco: Never Used   Substance and Sexual Activity    Alcohol use: Yes     Frequency: Monthly or less     Comment: socially    Drug use: Never    Sexual activity: Yes     Partners: Female       No Known Allergies      Current Outpatient Medications:     acetaminophen (TYLENOL) 325 mg tablet, Take 650 mg by mouth every 6 (six) hours as needed for mild pain, Disp: , Rfl:     Ascorbic Acid (Vitamin C) 500 MG CAPS, Take 1 capsule by mouth daily, Disp: , Rfl:     cyclobenzaprine (FLEXERIL) 10 mg tablet, Take 1 tablet (10 mg total) by mouth every 12 (twelve) hours as needed for muscle spasms, Disp: 30 tablet, Rfl: 0    famotidine (PEPCID) 40 MG tablet, TAKE 1 TABLET BY MOUTH EVERY DAY (Patient not taking: Reported on 5/14/2021), Disp: 90 tablet, Rfl: 2    Multiple Vitamin (multivitamin) tablet, Take 1 tablet by mouth daily, Disp: , Rfl:     naproxen (NAPROSYN) 500 mg tablet, Take 1 tablet (500 mg total) by mouth 2 (two) times a day with meals (Patient not taking: Reported on 5/14/2021), Disp: 60 tablet, Rfl: 0    ondansetron (ZOFRAN-ODT) 4 mg disintegrating tablet, Take 1 tablet (4 mg total) by mouth every 8 (eight) hours as needed for nausea or vomiting, Disp: 15 tablet, Rfl: 0    Zinc 50 MG TABS, Take 1 tablet by mouth daily, Disp: , Rfl:       [facility]    Scribe Attestation    I,:   am acting as a scribe while in the presence of the attending physician :       I,:   personally performed the services described in this documentation    as scribed in my presence :

## 2021-05-17 ENCOUNTER — OFFICE VISIT (OUTPATIENT)
Dept: PHYSICAL THERAPY | Facility: CLINIC | Age: 41
End: 2021-05-17
Payer: COMMERCIAL

## 2021-05-17 DIAGNOSIS — S89.92XA INJURY OF LEFT KNEE, INITIAL ENCOUNTER: ICD-10-CM

## 2021-05-17 DIAGNOSIS — S83.512D RUPTURE OF ANTERIOR CRUCIATE LIGAMENT OF LEFT KNEE, SUBSEQUENT ENCOUNTER: Primary | ICD-10-CM

## 2021-05-17 PROCEDURE — 97112 NEUROMUSCULAR REEDUCATION: CPT | Performed by: PHYSICAL THERAPIST

## 2021-05-17 PROCEDURE — 97110 THERAPEUTIC EXERCISES: CPT | Performed by: PHYSICAL THERAPIST

## 2021-05-17 NOTE — PROGRESS NOTES
Daily Note     Today's date: 2021  Patient name: Gillian Franks  : 1980  MRN: [id number]  Referring provider: Haylee Sigala MD  Dx:   Encounter Diagnosis     ICD-10-CM    1  Rupture of anterior cruciate ligament of left knee, subsequent encounter  S83 512D    2  Injury of left knee, initial encounter  S89  92XA                   Subjective: knee is feeling good, good report from surgeon  Objective: See treatment diary below      Assessment: Tolerated treatment well  Patient demonstrated fatigue post treatment      Plan: Continue per plan of care        Precautions: ACL 3/8 PTB autograft, meniscus repair; limit flexion to 90 1st 3 wks      Manuals      Patella mobs kelvin warren  mb                        BPPV recheck    KT          Convergence recheck    KT          Neuro Re-Ed              Russian stim 10/30 4/12 w slr 10' slr 10' slr           biodex catch 4' L12             SLB - ball toss  Ball toss x30 Ball toss x30 30 30 30 30 30 30     Foam tandem   x6 x6 x8 x8 x8 x8 x8                   Ther Ex              Knee ext 90-45        nv      Leg press 3 pl 3x10 4 pl 3x10 4 pl 3x10 4 pl 3x10 5 pl  3x10 5 pl 3x10 5 pl 3x10 5pl x30 6pl x30     Step ups 6"x30 6"x30  6"x30 8" 8"x30 8"x30 8"x30 8"x30     Ankle tband                            Heel  Raises single single 3x10 Single 3x10 Single 3x10 Single 3x10  Single 3x10 Single 3x10 3x10 3x10     squats 30 10s  x20 10sx20 Single 30  30 Rocker board rocker x30 Rocker x30     Ham curls 3 pl x30 4 pl 3x10 4 pl 3x10 4 pl 3x10 5 pl 3x10 5 pl 3x10 5 pl 3x10 5plx20, 6 pl x10 6 pl 3x10     Dead lifts 30 30 10# dbls 10# dbls 30 15 dbls 15 # dbls 30 15# dbls 30 15# dmbls 30 15# dmbls 30     bridges    W/march 20  30 30 30 W/ march 5s     Lunges; frwd, lat     10 ea 10 ea 10 30 ea 30 ea     Ther Activity              Quad stretch         Prone 30s     bike 10' 10 10 10 10 10 10' 10 10     stepper 10' 10 10 10 10 10 10' 5 6' Step over cone              stairs    x3          Side step Red 3' Blue 3' Blue 3' devin 3' Blue 3' Devin 3' devin 3' Blue 3' devin 3'     TM retro              planks    x10 10 10 10 10 10     Modalities

## 2021-05-19 ENCOUNTER — OFFICE VISIT (OUTPATIENT)
Dept: PHYSICAL THERAPY | Facility: CLINIC | Age: 41
End: 2021-05-19
Payer: COMMERCIAL

## 2021-05-19 DIAGNOSIS — S89.92XA INJURY OF LEFT KNEE, INITIAL ENCOUNTER: ICD-10-CM

## 2021-05-19 DIAGNOSIS — S83.512D RUPTURE OF ANTERIOR CRUCIATE LIGAMENT OF LEFT KNEE, SUBSEQUENT ENCOUNTER: Primary | ICD-10-CM

## 2021-05-19 PROCEDURE — 97530 THERAPEUTIC ACTIVITIES: CPT | Performed by: PHYSICAL THERAPIST

## 2021-05-19 PROCEDURE — 97112 NEUROMUSCULAR REEDUCATION: CPT | Performed by: PHYSICAL THERAPIST

## 2021-05-19 PROCEDURE — 97110 THERAPEUTIC EXERCISES: CPT | Performed by: PHYSICAL THERAPIST

## 2021-05-19 NOTE — PROGRESS NOTES
Daily Note     Today's date: 2021  Patient name: Ezequiel Leiva  : 1980  MRN: [id number]  Referring provider: Rosita Christine MD  Dx:   Encounter Diagnosis     ICD-10-CM    1  Rupture of anterior cruciate ligament of left knee, subsequent encounter  S83 512D    2  Injury of left knee, initial encounter  S89  92XA                   Subjective: knee is feeling pretty good      Objective: See treatment diary below      Assessment: Tolerated treatment well  Patient demonstrated fatigue post treatment      Plan: Continue per plan of care        Precautions: ACL 3/8 PTB autograft, meniscus repair; limit flexion to 90 1st 3 wks      Manuals     Patella mobs kelvin warren                  BPPV recheck    KT          Convergence recheck    KT          Neuro Re-Ed              Russian stim 10/30 4/12 w slr 10' slr 10' slr           biodex catch 4' L12             SLB - ball toss  Ball toss x30 Ball toss x30 30 30 30 30 30 30 30    Foam tandem   x6 x6 x8 x8 x8 x8 x8 x8                  Ther Ex              Knee ext 90-45        nv      Leg press 3 pl 3x10 4 pl 3x10 4 pl 3x10 4 pl 3x10 5 pl  3x10 5 pl 3x10 5 pl 3x10 5pl x30 6pl x30 6pl 30    Step ups 6"x30 6"x30  6"x30 8" 8"x30 8"x30 8"x30 8"x30                                 Heel  Raises single single 3x10 Single 3x10 Single 3x10 Single 3x10  Single 3x10 Single 3x10 3x10 3x10 3x10    squats 30 10s  x20 10sx20 Single 30  30 Rocker board rocker x30 Rocker x30 Rocker x30    Ham curls 3 pl x30 4 pl 3x10 4 pl 3x10 4 pl 3x10 5 pl 3x10 5 pl 3x10 5 pl 3x10 5plx20, 6 pl x10 6 pl 3x10 6 p  3x10l    Dead lifts 30 30 10# dbls 10# dbls 30 15 dbls 15 # dbls 30 15# dbls 30 15# dmbls 30 15# dmbls 30 15# dmbls 30    bridges    W/march 20  30 30 30 W/ march 5s Swiss ball curls 20    Lunges; frwd, lat     10 ea 10 ea 10 30 ea 30 ea 20 ea    Ther Activity              Quad stretch         Prone 30s 30s    bike 10' 10 10 10 10 10 10' 10 10 10'    stepper 10' 10 10 10 10 10 10' 5 6' 6'                  Step over cone              stairs    x3          Side step Red 3' Blue 3' Blue 3' devin 3' Blue 3' Devin 3' devin 3' Blue 3' devin 3' devin 3'    TM retro              planks    x10 10 10 10 10 10 10    Modalities

## 2021-05-21 ENCOUNTER — OFFICE VISIT (OUTPATIENT)
Dept: PHYSICAL THERAPY | Facility: CLINIC | Age: 41
End: 2021-05-21
Payer: COMMERCIAL

## 2021-05-21 DIAGNOSIS — S83.512D RUPTURE OF ANTERIOR CRUCIATE LIGAMENT OF LEFT KNEE, SUBSEQUENT ENCOUNTER: Primary | ICD-10-CM

## 2021-05-21 DIAGNOSIS — S89.92XA INJURY OF LEFT KNEE, INITIAL ENCOUNTER: ICD-10-CM

## 2021-05-21 PROCEDURE — 97112 NEUROMUSCULAR REEDUCATION: CPT | Performed by: PHYSICAL THERAPIST

## 2021-05-21 PROCEDURE — 97110 THERAPEUTIC EXERCISES: CPT | Performed by: PHYSICAL THERAPIST

## 2021-05-21 NOTE — PROGRESS NOTES
Daily Note     Today's date: 2021  Patient name: Ml Freeman  : 1980  MRN: [id number]  Referring provider: Mendel Jacobson MD  Dx:   Encounter Diagnosis     ICD-10-CM    1  Rupture of anterior cruciate ligament of left knee, subsequent encounter  S83 512D    2  Injury of left knee, initial encounter  S89  92XA                   Subjective: felt a popping sensation in lateral portal site f/b mild swelling yesterday but denies pain today      Objective: See treatment diary below      Assessment: Tolerated treatment well  Patient demonstrated fatigue post treatment      Plan: Continue per plan of care        Precautions: ACL 3/8 PTB autograft, meniscus repair; limit flexion to 90 1st 3 wks      Manuals    Patella mobs mb mb  mb      mb                  BPPV recheck    KT          Convergence recheck    KT          Neuro Re-Ed              Russian stim 10/30 4/12 w slr 10' slr 10' slr           biodex catch 4' L12             SLB - ball toss  Ball toss x30 Ball toss x30 30 30 30 30 30 30 30 30   Foam tandem   x6 x6 x8 x8 x8 x8 x8 x8 x8                 Ther Ex              Knee ext 90-45        nv      Leg press 3 pl 3x10 4 pl 3x10 4 pl 3x10 4 pl 3x10 5 pl  3x10 5 pl 3x10 5 pl 3x10 5pl x30 6pl x30 6pl 30 6pl x30   Step ups 6"x30 6"x30  6"x30 8" 8"x30 8"x30 8"x30 8"x30  10"x30                               Heel  Raises single single 3x10 Single 3x10 Single 3x10 Single 3x10  Single 3x10 Single 3x10 3x10 3x10 3x10 3x10   squats 30 10s  x20 10sx20 Single 30  30 Rocker board rocker x30 Rocker x30 Rocker x30 rocker x30   Ham curls 3 pl x30 4 pl 3x10 4 pl 3x10 4 pl 3x10 5 pl 3x10 5 pl 3x10 5 pl 3x10 5plx20, 6 pl x10 6 pl 3x10 6 p  3x10l 6 pl 3x10   Dead lifts 30 30 10# dbls 10# dbls 30 15 dbls 15 # dbls 30 15# dbls 30 15# dmbls 30 15# dmbls 30 15# dmbls 30 20# 30   bridges    W/march 20  30 30 30 W/ march 5s Swiss ball curls 20 Swiss 20   Lunges; frwd, lat     10 ea 10 ea 10 30 ea 30 ea 20 ea 20 ea   Chair lunge          2x10 2x10   Ther Activity              Quad stretch         Prone 30s 30s    bike 10' 10 10 10 10 10 10' 10 10 10'    stepper 10' 10 10 10 10 10 10' 5 6' 6' 6'                 Step over cone              stairs    x3          Side step Red 3' Blue 3' Blue 3' devin 3' Blue 3' Devin 3' devin 3' Blue 3' devin 3' devin 3' devin 3'   TM retro              planks    x10 10 10 10 10 10 10 10   Modalities

## 2021-05-24 ENCOUNTER — OFFICE VISIT (OUTPATIENT)
Dept: PHYSICAL THERAPY | Facility: CLINIC | Age: 41
End: 2021-05-24

## 2021-05-24 ENCOUNTER — EVALUATION (OUTPATIENT)
Dept: PHYSICAL THERAPY | Facility: CLINIC | Age: 41
End: 2021-05-24
Payer: COMMERCIAL

## 2021-05-24 DIAGNOSIS — S83.512D RUPTURE OF ANTERIOR CRUCIATE LIGAMENT OF LEFT KNEE, SUBSEQUENT ENCOUNTER: Primary | ICD-10-CM

## 2021-05-24 DIAGNOSIS — S89.92XA INJURY OF LEFT KNEE, INITIAL ENCOUNTER: ICD-10-CM

## 2021-05-24 PROCEDURE — 97760 ORTHOTIC MGMT&TRAING 1ST ENC: CPT | Performed by: PHYSICAL THERAPIST

## 2021-05-24 PROCEDURE — 97110 THERAPEUTIC EXERCISES: CPT | Performed by: PHYSICAL THERAPIST

## 2021-05-24 NOTE — PROGRESS NOTES
Daily Note   ORTHOTICS    Today's date: 2021  Patient name: Neftali Anderson  : 1980  MRN: [id number]  Referring provider: Danae Sutherland MD  Dx:   Encounter Diagnosis     ICD-10-CM    1  Rupture of anterior cruciate ligament of left knee, subsequent encounter  S83 512D    2  Injury of left knee, initial encounter  S89  92XA                   Subjective: Pt presents for custom orthotics due to chronic R ankle pain and   S/p L ACL reconstruction  C/o soreness in achilles and pain in dorsum of R ankle  Ankle pain is 4/10    Objective: See treatment diary below  Mild pes planus L  LMI R 0 deg, L 4 deg ev    Gait: early heel off R  Mod STJ pronation L    General hypomobility R ankle and STJ; L wfl  Hallux DF PROM R 62 deg, L 75 deg    Casted for custom orthoitcs    Did not complete all exs due to time constraints    Assessment: Tolerated treatment well  Patient would benefit from continued PT and custom orthotics      Plan: Continue per plan of care        Precautions: ACL 3/8 PTB autograft, meniscus repair; limit flexion to 90 1st 3 wks      Manuals  5   Patella mobs mb mb  mb      mb                    BPPV recheck    KT           Convergence recheck    KT           Neuro Re-Ed               Russian stim 10/30 4/12 w slr 10' slr 10' slr            biodex catch 4' L12              SLB - ball toss  Ball toss x30 Ball toss x30 30 30 30 30 30 30 30 30    Foam tandem   x6 x6 x8 x8 x8 x8 x8 x8 x8                   Ther Ex               Knee ext 90-45        nv       Leg press 3 pl 3x10 4 pl 3x10 4 pl 3x10 4 pl 3x10 5 pl  3x10 5 pl 3x10 5 pl 3x10 5pl x30 6pl x30 6pl 30 6pl x30 6 pl x30   Step ups 6"x30 6"x30  6"x30 8" 8"x30 8"x30 8"x30 8"x30  10"x30                                  Heel  Raises single single 3x10 Single 3x10 Single 3x10 Single 3x10  Single 3x10 Single 3x10 3x10 3x10 3x10 3x10 3x10   squats 30 10s  x20 10sx20 Single 30  30 Rocker board rocker x30 Rocker x30 Rocker x30 rocker x30    Ham curls 3 pl x30 4 pl 3x10 4 pl 3x10 4 pl 3x10 5 pl 3x10 5 pl 3x10 5 pl 3x10 5plx20, 6 pl x10 6 pl 3x10 6 p  3x10l 6 pl 3x10 6 pl 3x10   Dead lifts 30 30 10# dbls 10# dbls 30 15 dbls 15 # dbls 30 15# dbls 30 15# dmbls 30 15# dmbls 30 15# dmbls 30 20# 30 20# x30   bridges    W/march 20  30 30 30 W/ march 5s Swiss ball curls 20 Swiss 20    Lunges; frwd, lat     10 ea 10 ea 10 30 ea 30 ea 20 ea 20 ea 20 ea   Chair lunge          2x10 2x10    Ther Activity               Quad stretch         Prone 30s 30s     bike 10' 10 10 10 10 10 10' 10 10 10'     stepper 10' 10 10 10 10 10 10' 5 6' 6' 6'                   Step over cone               stairs    x3           Side step Red 3' Blue 3' Blue 3' dvein 3' Blue 3' Devin 3' devin 3' Blue 3' devin 3' devin 3' devin 3' devin 3'   TM retro               planks    x10 10 10 10 10 10 10 10    Modalities

## 2021-05-25 ENCOUNTER — APPOINTMENT (OUTPATIENT)
Dept: RADIOLOGY | Facility: AMBULARY SURGERY CENTER | Age: 41
End: 2021-05-25
Payer: COMMERCIAL

## 2021-05-25 ENCOUNTER — OFFICE VISIT (OUTPATIENT)
Dept: OBGYN CLINIC | Facility: CLINIC | Age: 41
End: 2021-05-25
Payer: COMMERCIAL

## 2021-05-25 VITALS
DIASTOLIC BLOOD PRESSURE: 96 MMHG | BODY MASS INDEX: 35.08 KG/M2 | WEIGHT: 259 LBS | HEART RATE: 93 BPM | SYSTOLIC BLOOD PRESSURE: 149 MMHG | HEIGHT: 72 IN

## 2021-05-25 DIAGNOSIS — M25.571 PAIN, JOINT, ANKLE AND FOOT, RIGHT: ICD-10-CM

## 2021-05-25 DIAGNOSIS — M19.171 POST-TRAUMATIC ARTHRITIS OF RIGHT ANKLE: Primary | ICD-10-CM

## 2021-05-25 PROCEDURE — 3008F BODY MASS INDEX DOCD: CPT | Performed by: ORTHOPAEDIC SURGERY

## 2021-05-25 PROCEDURE — 73610 X-RAY EXAM OF ANKLE: CPT

## 2021-05-25 PROCEDURE — 99214 OFFICE O/P EST MOD 30 MIN: CPT | Performed by: ORTHOPAEDIC SURGERY

## 2021-05-25 PROCEDURE — 20605 DRAIN/INJ JOINT/BURSA W/O US: CPT | Performed by: ORTHOPAEDIC SURGERY

## 2021-05-25 PROCEDURE — 1036F TOBACCO NON-USER: CPT | Performed by: ORTHOPAEDIC SURGERY

## 2021-05-25 RX ORDER — TRIAMCINOLONE ACETONIDE 40 MG/ML
40 INJECTION, SUSPENSION INTRA-ARTICULAR; INTRAMUSCULAR
Status: COMPLETED | OUTPATIENT
Start: 2021-05-25 | End: 2021-05-25

## 2021-05-25 RX ADMIN — TRIAMCINOLONE ACETONIDE 40 MG: 40 INJECTION, SUSPENSION INTRA-ARTICULAR; INTRAMUSCULAR at 08:55

## 2021-05-25 NOTE — PROGRESS NOTES
HERMILO Li  Attending, Orthopaedic Surgery  Foot and [address] [address] [facility]        ORTHOPAEDIC FOOT AND ANKLE CLINIC VISIT     Assessment:     Encounter Diagnoses   Name Primary?  Pain, joint, ankle and foot, right     Post-traumatic arthritis of right ankle Yes              Plan:   · The patient verbalized understanding of exam findings and treatment plan  We engaged in the shared decision-making process and treatment options were discussed at length with the patient  Surgical and conservative management discussed today along with risks and benefits  · Jewell Fitzgerald has severe post-traumatic arthritis of the right ankle  · Discussed conservative non-operative treatment options  · Offered him an corticosteroid injection into the right ankle, see procedure note below  · Recommend NSAIDs and/or tylenol as needed for pain  · Ordered an [place] brace today  Brace info given  An over the counter non-custom brace is not sufficient for his degree of ankle arthritis  · If he fails conservative treatments, he would be a candidate for TAA vs ankle arthrodesis   Return in about 3 months (around 8/25/2021)  History of Present Illness:   Chief Complaint:   Chief Complaint   Patient presents with    Right Ankle - Pain     Yane Florence is a 37 y o  male who is being seen for right ankle pain  Patient reports in 2004, he had a tib/fib fracture ORIF in [place], [place]  Pain is localized at anterior ankle joint with minimal radiating and described as sharp and severe  Patient denies numbness, tingling or radicular pain  Denies history of neuropathy  Patient does not smoke, does not have diabetes and does not take blood thinners  Patient denies family history of anesthesia complications and has not had any complications with anesthesia       Pain/symptom timing:  Worse during the day when active  Pain/symptom context:  Worse with activites and work  Pain/symptom modifying factors:  Rest makes better, activities make worse  Pain/symptom associated signs/symptoms: none    Prior treatment   · NSAIDsYes    · Injections No   · Bracing/Orthotics Yes - ASO brace  · Physical Therapy No     Orthopedic Surgical History:   Multiple surgeries for right ankle fracture/dislocation in 2004 in [place], [place]  He required an external fixator initially  Patient reports the hardware was removed from the medial malleolus but he has remaining hardware in the fibula and posterior malleolus  Past Medical, Surgical and Social History:  Past Medical History:  has a past medical history of Concussion and Wears contact lenses  Problem List: does not have any pertinent problems on file  Past Surgical History:  has a past surgical history that includes EGD; Tibia fracture surgery; ORIF tibia & fibula fractures (Right); and pr knee scope,aid ant cruciate repair (Left, 3/8/2021)  Family History: family history is not on file  Social History:  reports that he has never smoked  He has never used smokeless tobacco  He reports current alcohol use  He reports that he does not use drugs  Current Medications: has a current medication list which includes the following prescription(s): acetaminophen, vitamin c, cyclobenzaprine, famotidine, multivitamin, naproxen, ondansetron, and zinc   Allergies: has No Known Allergies       Review of Systems:  General- denies fever/chills  HEENT- denies hearing loss or sore throat  Eyes- denies eye pain or visual disturbances, denies red eyes  Respiratory- denies cough or SOB  Cardio- denies chest pain or palpitations  GI- denies abdominal pain  Endocrine- denies urinary frequency  Urinary- denies pain with urination  Musculoskeletal- Negative except noted above  Skin- denies rashes or wounds  Neurological- denies dizziness or headache  Psychiatric- denies anxiety or difficulty concentrating    Physical Exam:   /96   Pulse 93   Ht 6' (1 829 m)   Wt 117 kg (259 lb)   BMI 35 13 kg/m² General/Constitutional: No apparent distress: well-nourished and well developed  Eyes: normal ocular motion  Cardio: RRR, Normal S1S2, No m/r/g  Lymphatic: No appreciable lymphadenopathy  Respiratory: Non-labored breathing, CTA b/l no w/c/r  Vascular: No edema, swelling or tenderness, except as noted in detailed exam   Integumentary: No impressive skin lesions present, except as noted in detailed exam   Neuro: No ataxia or tremors noted  Psych: Normal mood and affect, oriented to person, place and time  Appropriate affect  Musculoskeletal: Normal, except as noted in detailed exam and in HPI  Examination    Right    Gait Antalgic   Musculoskeletal Tender to palpation at anterior ankle    Skin Normal   Well healed incisions over the anteromedial and lateral ankle  Nails Normal    Range of Motion  5 degrees dorsiflexion, 30 degrees plantarflexion  Subtalar motion: normal    Stability Stable    Muscle Strength 5/5 tibialis anterior  5/5 gastrocnemius-soleus  5/5 posterior tibialis  5/5 peroneal/eversion strength  5/5 EHL  5/5 FHL    Neurologic Normal    Sensation  Intact to light touch throughout sural, saphenous, superficial peroneal, deep peroneal and medial/lateral plantar nerve distributions  [place] 5 07 filament (10g) testing  deferred  Cardiovascular Brisk capillary refill < 2 seconds,intact DP and PT pulses    Special Tests None      Imaging Studies:   6 views of the right ankle were taken, reviewed and interpreted independently that demonstrate severe ankle arthritis  Post-operative changes with well healed fractures of the distal fibula, posterior malleolus, and medial malleolus  Hardware in place in the distal fibula and posterior tibia  Reviewed by me personally  Medium joint arthrocentesis: R ankle  Universal Protocol:  Consent: Verbal consent obtained    Risks and benefits: risks, benefits and alternatives were discussed  Consent given by: patient  Patient understanding: patient states understanding of the procedure being performed  Radiology Images displayed and confirmed  If images not available, report reviewed: imaging studies available  Patient identity confirmed: verbally with patient    Supporting Documentation  Indications: pain   Procedure Details  Location: ankle - R ankle  Preparation: Patient was prepped and draped in the usual sterile fashion  Needle size: 22 G  Ultrasound guidance: no  Approach: anteromedial  Medications administered: 40 mg triamcinolone acetonide 40 mg/mL (4 mL 1% lidocaine without epi)    Patient tolerance: patient tolerated the procedure well with no immediate complications  Dressing:  Sterile dressing applied              Scribe Attestation    I,:  Kenzie Estrada PA-C am acting as a scribe while in the presence of the attending physician :       I,:  Shubham Seaman MD personally performed the services described in this documentation    as scribed in my presence :             Dory Cross Lachman, MD  Foot & Ankle Surgery   Department [place] [address]      I personally performed the service  Dory Cross Lachman, MD

## 2021-05-25 NOTE — PATIENT INSTRUCTIONS
Today, We recommended a brace/prosthesis for you  [facility] certified pedorthotists make orthotics but not braces or prosthesis  Below are the companies in the area that make these, Please call ahead for an appointment and to ensure the company accepts your insurance  Make sure to bring the prescription given to you in the office today to the company for the brace/prosthesis  [address] [facility]   · [address] [facility]  [address] [facility]   [address] Phone: [phone]  [facility] Fax: [phone]  ·   [address]  [address]  [place], [address]  (By Appointment Only)  Directions  [facility], [address] [place] [address] Dr Miriam NEWBERRY Phone: [phone]  [place] Fax: 210 [facility] Location  [address]  [address] [facility]  [phone] (fax)    [facility]  [address] [facility], [address] [facility],[address]  [place], [facility]  [phone] (fax)    [facility]  [address] [facility], [address]  [phone] (fax)    [facility] Location  [address] [facility] [address]  [address] [facility]  [phone] (fax)    [facility]  [address] , [address], [place] [phone] (fax)      During your appointment today, we injected your right ankle with a pain medication and steroids  It is common to immediately feel pain relief after the injection  This is temporary as the pain medication in the injection can wear off in 4-8 hours  Often, the pain returns later in the day and this is normal   The steroid can take 24-48 hours to kick in  Common complaints;  1  Pain at the injection site (like a bee sting)- use ice for 20 minutes at a time, taking 40 minutes break between ice sessions, to alleviate this discomfort  2   Skin discoloration- this is a common side-effect of the steroid and can be a permanent skin color change  3  Blood sugar elevation- occasionally, steroid injections can effect blood sugar in diabetic patients  We recommend monitoring your blood sugar over the next 3 days if you are diabetic  If the injection works, even if only for 20 minutes, it means that we have isolated the pain generator (i e  The injection was diagnositic)  The hope is that the injection provides prolonged pain relief (i e  The injection was therapeutic)  If you get better but the pain returns, it is a good sign that a surgery to correct your problem is very likely to eliminate the pain permanently

## 2021-05-26 ENCOUNTER — OFFICE VISIT (OUTPATIENT)
Dept: PHYSICAL THERAPY | Facility: CLINIC | Age: 41
End: 2021-05-26
Payer: COMMERCIAL

## 2021-05-26 DIAGNOSIS — S83.512D RUPTURE OF ANTERIOR CRUCIATE LIGAMENT OF LEFT KNEE, SUBSEQUENT ENCOUNTER: Primary | ICD-10-CM

## 2021-05-26 DIAGNOSIS — S89.92XA INJURY OF LEFT KNEE, INITIAL ENCOUNTER: ICD-10-CM

## 2021-05-26 PROCEDURE — 97110 THERAPEUTIC EXERCISES: CPT | Performed by: PHYSICAL THERAPIST

## 2021-05-26 PROCEDURE — 97530 THERAPEUTIC ACTIVITIES: CPT | Performed by: PHYSICAL THERAPIST

## 2021-05-26 PROCEDURE — 97112 NEUROMUSCULAR REEDUCATION: CPT | Performed by: PHYSICAL THERAPIST

## 2021-05-26 NOTE — PROGRESS NOTES
Daily Note     Today's date: 2021  Patient name: Airn Gutiérrez  : 1980  MRN: [id number]  Referring provider: Gurdeep Smiley MD  Dx:   Encounter Diagnosis     ICD-10-CM    1  Rupture of anterior cruciate ligament of left knee, subsequent encounter  S83 512D    2  Injury of left knee, initial encounter  S89  92XA                   Subjective: recv'd injection to R ankle, is feeling much better      Objective: See treatment diary below      Assessment: Tolerated treatment well  Patient demonstrated fatigue post treatment      Plan: Continue per plan of care        Precautions: ACL 3/8 PTB autograft, meniscus repair; limit flexion to 90 1st 3 wks      Manuals    Patella mobs mb                         Neuro Re-Ed             biodex catch             SLB - ball toss 30            Foam tandem                          Ther Ex             Knee ext 90-45 nv            Leg press 6 pl x30         6 pl x30   Step ups                                       Heel  Raises single 3x10         3x10   squats             Ham curls 6 pl 3x10         6 pl 3x10   Dead lifts 20# x30         20# x30   bridges             Lunges; frwd, lat 20ea         20 ea   Chair lunge nv            Ther Activity             Quad stretch             bike 10            stepper 5                         Cone touch nv                         Side step          sony 3'   TM retro             planks             Modalities

## 2021-05-28 ENCOUNTER — APPOINTMENT (OUTPATIENT)
Dept: PHYSICAL THERAPY | Facility: CLINIC | Age: 41
End: 2021-05-28
Payer: COMMERCIAL

## 2021-06-04 ENCOUNTER — OFFICE VISIT (OUTPATIENT)
Dept: PHYSICAL THERAPY | Facility: CLINIC | Age: 41
End: 2021-06-04
Payer: COMMERCIAL

## 2021-06-04 DIAGNOSIS — S89.92XA INJURY OF LEFT KNEE, INITIAL ENCOUNTER: ICD-10-CM

## 2021-06-04 DIAGNOSIS — S83.512D RUPTURE OF ANTERIOR CRUCIATE LIGAMENT OF LEFT KNEE, SUBSEQUENT ENCOUNTER: Primary | ICD-10-CM

## 2021-06-04 PROCEDURE — 97110 THERAPEUTIC EXERCISES: CPT | Performed by: PHYSICAL THERAPIST

## 2021-06-04 PROCEDURE — 97530 THERAPEUTIC ACTIVITIES: CPT | Performed by: PHYSICAL THERAPIST

## 2021-06-04 PROCEDURE — 97112 NEUROMUSCULAR REEDUCATION: CPT | Performed by: PHYSICAL THERAPIST

## 2021-06-04 NOTE — PROGRESS NOTES
Daily Note     Today's date: 2021  Patient name: Yane Florence  : 1980  MRN: [id number]  Referring provider: Caroline Renee MD  Dx:   Encounter Diagnosis     ICD-10-CM    1  Rupture of anterior cruciate ligament of left knee, subsequent encounter  S83 512D    2  Injury of left knee, initial encounter  S89  92XA                   Subjective: reports that knee feels pretty good, also, no R ankle pain  Wearing ASO on R ankle      Objective: See treatment diary below      Assessment: Tolerated treatment well  Patient demonstrated fatigue post treatment      Plan: Continue per plan of care        Precautions: ACL 3/8 PTB autograft, meniscus repair; limit flexion to 90 1st 3 wks      Manuals    Patella mobjeb warren mb                        Neuro Re-Ed             biodex catch             SLB - ball toss 30 foam           Foam tandem  8' x6                        Ther Ex             Knee ext 90-45 nv IP tband           Leg press 6 pl x30 6pl x30 7 pl       6 pl x30   Step ups - lateral  6" x30           Wall sits: 10s  Single x10           Step ups - frwd w  Arm drive  10" U92           Heel  Raises single 3x10         3x10   squats             Ham curls 6 pl 3x10 Bridge on ball 20        6 pl 3x10   Dead lifts 20# x30 nv        20# x30   bridges  / ea           Lunges; frwd, lat 20ea 20 ea        20 ea   Chair lunge nv nv           Ther Activity             Quad stretch  30s x2           bike 10 10           stepper 5 5                        Cone touch nv 2x5                        Side step          sony 3'   TM retro             planks  10           Modalities

## 2021-06-07 ENCOUNTER — OFFICE VISIT (OUTPATIENT)
Dept: PHYSICAL THERAPY | Facility: CLINIC | Age: 41
End: 2021-06-07
Payer: COMMERCIAL

## 2021-06-07 DIAGNOSIS — S89.92XA INJURY OF LEFT KNEE, INITIAL ENCOUNTER: Primary | ICD-10-CM

## 2021-06-07 PROCEDURE — 97110 THERAPEUTIC EXERCISES: CPT | Performed by: PHYSICAL THERAPIST

## 2021-06-07 PROCEDURE — 97112 NEUROMUSCULAR REEDUCATION: CPT | Performed by: PHYSICAL THERAPIST

## 2021-06-07 PROCEDURE — 97530 THERAPEUTIC ACTIVITIES: CPT | Performed by: PHYSICAL THERAPIST

## 2021-06-07 NOTE — PROGRESS NOTES
Daily Note     Today's date: 2021  Patient name: Aiyana Warner  : 1980  MRN: [id number]  Referring provider: James Reynolds MD  Dx:   Encounter Diagnosis     ICD-10-CM    1  Injury of left knee, initial encounter  S89  92XA                   Subjective: knee is feeling pretty good      Objective: See treatment diary below      Assessment: Tolerated treatment well  Patient demonstrated fatigue post treatment      Plan: Continue per plan of care        Precautions: ACL 3/8 PTB autograft, meniscus repair; limit flexion to 90 1st 3 wks      Manuals    Patella nathan warren mb                        Neuro Re-Ed             biodex catch             SLB - ball toss 30 foam Foam 30          Foam tandem  8' x6                        Ther Ex             Knee ext 90-45 nv IP tband           Leg press 6 pl x30 6pl x30 7 pl 3x10       6 pl x30   Step ups - lateral  6" x30 8"x30          Wall sits: 10s  Single x10 Single x10          Step ups - frwd w  Arm drive  10" C04 35"G94          Heel  Raises single 3x10  Single 3x10       3x10   squats             Ham curls 6 pl 3x10 Bridge on ball 20 Ball x10       6 pl 3x10   Dead lifts 20# x30 nv 20# x30       20# x30   bridges  / ea 30          Lunges; frwd, lat 20ea 20 ea 20 ea       20 ea   Chair lunge nv nv reviewed          Ther Activity             Quad stretch  30s x2 30sx2          bike 10 10 10          stepper 5 5 5                       Cone touch nv 2x5 2x5                       Side step   sony 3'       sony 3'   TM retro             planks  10 10          Modalities

## 2021-06-14 ENCOUNTER — OFFICE VISIT (OUTPATIENT)
Dept: PHYSICAL THERAPY | Facility: CLINIC | Age: 41
End: 2021-06-14
Payer: COMMERCIAL

## 2021-06-14 DIAGNOSIS — S83.512D RUPTURE OF ANTERIOR CRUCIATE LIGAMENT OF LEFT KNEE, SUBSEQUENT ENCOUNTER: ICD-10-CM

## 2021-06-14 DIAGNOSIS — S89.92XA INJURY OF LEFT KNEE, INITIAL ENCOUNTER: Primary | ICD-10-CM

## 2021-06-14 PROCEDURE — 97112 NEUROMUSCULAR REEDUCATION: CPT | Performed by: PHYSICAL THERAPIST

## 2021-06-14 PROCEDURE — 97530 THERAPEUTIC ACTIVITIES: CPT | Performed by: PHYSICAL THERAPIST

## 2021-06-14 PROCEDURE — 97110 THERAPEUTIC EXERCISES: CPT | Performed by: PHYSICAL THERAPIST

## 2021-06-14 NOTE — PROGRESS NOTES
Daily Note     Today's date: 2021  Patient name: Terra Kilpatrick  : 1980  MRN: [id number]  Referring provider: Antoine Torres MD  Dx:   Encounter Diagnosis     ICD-10-CM    1  Injury of left knee, initial encounter  S89  92XA    2  Rupture of anterior cruciate ligament of left knee, subsequent encounter  S83 512D                   Subjective: knee is feeling pretty good      Objective: See treatment diary below      Assessment: Tolerated treatment well  Patient would benefit from continued PT      Plan: Continue per plan of care        Precautions: ACL 3/8 PTB autograft, meniscus repair; limit flexion to 90 1st 3 wks      Manuals          Patella mobs                    Neuro Re-Ed          biodex catch          SLB - ball toss Foam 30         Foam tandem                    Ther Ex          Knee ext 90-45          Leg press 7 pl 3x10         Step ups - lateral 10"x30         Cross over step up 6"x30         Wall sits: 10s Single x10         Step ups - frwd w  Arm drive 41"E26         Heel  Raises single Single 3x10         squats          Ham curls Ball x20         Dead lifts 20# dbls x30         Single leg bent over row 20#2x10         bridges 30         Lunges; frwd, lat 20 ea         Chair lunge reviewed         Ther Activity          Quad stretch 30sx2         bike 10         stepper 5                   Cone touch 2x5                   Side step sony 3'         TM retro          planks 10         Modalities

## 2021-06-21 ENCOUNTER — OFFICE VISIT (OUTPATIENT)
Dept: PHYSICAL THERAPY | Facility: CLINIC | Age: 41
End: 2021-06-21
Payer: COMMERCIAL

## 2021-06-21 DIAGNOSIS — S89.92XA INJURY OF LEFT KNEE, INITIAL ENCOUNTER: Primary | ICD-10-CM

## 2021-06-21 DIAGNOSIS — S83.512D RUPTURE OF ANTERIOR CRUCIATE LIGAMENT OF LEFT KNEE, SUBSEQUENT ENCOUNTER: ICD-10-CM

## 2021-06-21 PROCEDURE — 97110 THERAPEUTIC EXERCISES: CPT

## 2021-06-21 PROCEDURE — 97530 THERAPEUTIC ACTIVITIES: CPT

## 2021-06-21 PROCEDURE — 97112 NEUROMUSCULAR REEDUCATION: CPT

## 2021-06-21 NOTE — PROGRESS NOTES
Daily Note     Today's date: 2021  Patient name: Ronda Valverde  : 1980  MRN: [id number]  Referring provider: Ratna Alfonso MD  Dx:   Encounter Diagnosis     ICD-10-CM    1  Injury of left knee, initial encounter  S89  92XA    2  Rupture of anterior cruciate ligament of left knee, subsequent encounter  S83 512D                   Subjective: "Very good overall " Notes descending stairs is difficult  Objective: See treatment diary below      Assessment: Unable to april single leg wall sits  Performed remaining exercise program w/o difficulty or discomfort  Tolerated treatment well  Patient would benefit from continued PT      Plan: Continue per plan of care        Precautions: ACL 3/8 PTB autograft, meniscus repair; limit flexion to 90 1st 3 wks      Manuals         Patella mobs                    Neuro Re-Ed          biodex catch          SLB - ball toss Foam 30 Green  Foam 30        Foam tandem                    Ther Ex          Knee ext 90-45          Leg press 7 pl 3x10 7pl  3x10        Step ups - lateral 10"x30 HEP        Cross over step up 6"x30 HEP        Wall sits: 10s Single x10   x10        Step ups - frwd w  Arm drive 44"B73 01"A04        Heel  Raises single Single 3x10 Single  30        squats          Ham curls Ball x20 Ball          Dead lifts 20# dbls x30 20#  dbls  x30        Single leg bent over row 20#2x10 20#  2x10        bridges 30  30        Lunges; frwd, lat 20 ea  20 ea        Chair lunge reviewed         Ther Activity          Quad stretch 30sx2 30s x2        bike 10 10'        stepper 5 5'                  Cone touch 2x5 2x5                  Side step sony 3' Blue   3laps        TM retro          planks 10  5"x10        Modalities

## 2021-06-25 ENCOUNTER — APPOINTMENT (OUTPATIENT)
Dept: LAB | Facility: CLINIC | Age: 41
End: 2021-06-25
Payer: COMMERCIAL

## 2021-06-25 ENCOUNTER — TELEPHONE (OUTPATIENT)
Dept: FAMILY MEDICINE CLINIC | Facility: CLINIC | Age: 41
End: 2021-06-25

## 2021-06-25 DIAGNOSIS — Z00.00 HEALTH CARE MAINTENANCE: Primary | ICD-10-CM

## 2021-06-25 DIAGNOSIS — E78.1 HYPERTRIGLYCERIDEMIA: ICD-10-CM

## 2021-06-25 DIAGNOSIS — R73.03 PREDIABETES: ICD-10-CM

## 2021-06-25 DIAGNOSIS — Z00.00 HEALTH CARE MAINTENANCE: ICD-10-CM

## 2021-06-25 LAB
ALBUMIN SERPL BCP-MCNC: 4.4 G/DL (ref 3.5–5)
ALP SERPL-CCNC: 76 U/L (ref 46–116)
ALT SERPL W P-5'-P-CCNC: 53 U/L (ref 12–78)
ANION GAP SERPL CALCULATED.3IONS-SCNC: 5 MMOL/L (ref 4–13)
AST SERPL W P-5'-P-CCNC: 27 U/L (ref 5–45)
BASOPHILS # BLD AUTO: 0.02 THOUSANDS/ΜL (ref 0–0.1)
BASOPHILS NFR BLD AUTO: 0 % (ref 0–1)
BILIRUB SERPL-MCNC: 0.45 MG/DL (ref 0.2–1)
BUN SERPL-MCNC: 17 MG/DL (ref 5–25)
CALCIUM SERPL-MCNC: 10.1 MG/DL (ref 8.3–10.1)
CHLORIDE SERPL-SCNC: 102 MMOL/L (ref 100–108)
CHOLEST SERPL-MCNC: 291 MG/DL (ref 50–200)
CO2 SERPL-SCNC: 31 MMOL/L (ref 21–32)
CREAT SERPL-MCNC: 1.05 MG/DL (ref 0.6–1.3)
EOSINOPHIL # BLD AUTO: 0.07 THOUSAND/ΜL (ref 0–0.61)
EOSINOPHIL NFR BLD AUTO: 1 % (ref 0–6)
ERYTHROCYTE [DISTWIDTH] IN BLOOD BY AUTOMATED COUNT: 14.1 % (ref 11.6–15.1)
EST. AVERAGE GLUCOSE BLD GHB EST-MCNC: 123 MG/DL
GFR SERPL CREATININE-BSD FRML MDRD: 88 ML/MIN/1.73SQ M
GLUCOSE P FAST SERPL-MCNC: 101 MG/DL (ref 65–99)
HBA1C MFR BLD: 5.9 %
HCT VFR BLD AUTO: 48.4 % (ref 36.5–49.3)
HDLC SERPL-MCNC: 47 MG/DL
HGB BLD-MCNC: 16 G/DL (ref 12–17)
IMM GRANULOCYTES # BLD AUTO: 0.05 THOUSAND/UL (ref 0–0.2)
IMM GRANULOCYTES NFR BLD AUTO: 1 % (ref 0–2)
LDLC SERPL CALC-MCNC: 166 MG/DL (ref 0–100)
LYMPHOCYTES # BLD AUTO: 3.15 THOUSANDS/ΜL (ref 0.6–4.47)
LYMPHOCYTES NFR BLD AUTO: 45 % (ref 14–44)
MCH RBC QN AUTO: 26.8 PG (ref 26.8–34.3)
MCHC RBC AUTO-ENTMCNC: 33.1 G/DL (ref 31.4–37.4)
MCV RBC AUTO: 81 FL (ref 82–98)
MONOCYTES # BLD AUTO: 0.37 THOUSAND/ΜL (ref 0.17–1.22)
MONOCYTES NFR BLD AUTO: 5 % (ref 4–12)
NEUTROPHILS # BLD AUTO: 3.36 THOUSANDS/ΜL (ref 1.85–7.62)
NEUTS SEG NFR BLD AUTO: 48 % (ref 43–75)
NRBC BLD AUTO-RTO: 0 /100 WBCS
PLATELET # BLD AUTO: 313 THOUSANDS/UL (ref 149–390)
PMV BLD AUTO: 9.2 FL (ref 8.9–12.7)
POTASSIUM SERPL-SCNC: 3.9 MMOL/L (ref 3.5–5.3)
PROT SERPL-MCNC: 8.2 G/DL (ref 6.4–8.2)
RBC # BLD AUTO: 5.97 MILLION/UL (ref 3.88–5.62)
SODIUM SERPL-SCNC: 138 MMOL/L (ref 136–145)
TRIGL SERPL-MCNC: 391 MG/DL
WBC # BLD AUTO: 7.02 THOUSAND/UL (ref 4.31–10.16)

## 2021-06-25 PROCEDURE — 80053 COMPREHEN METABOLIC PANEL: CPT

## 2021-06-25 PROCEDURE — 80061 LIPID PANEL: CPT

## 2021-06-25 PROCEDURE — 36415 COLL VENOUS BLD VENIPUNCTURE: CPT | Performed by: FAMILY MEDICINE

## 2021-06-25 PROCEDURE — 85025 COMPLETE CBC W/AUTO DIFF WBC: CPT

## 2021-06-25 PROCEDURE — 83036 HEMOGLOBIN GLYCOSYLATED A1C: CPT | Performed by: FAMILY MEDICINE

## 2021-06-25 NOTE — TELEPHONE ENCOUNTER
I called Suzi Lopez and informed him that blwk orders are placed and in the computer  Pt expressed he uses [facility]'s lab so they will see orders  Pt advised to fast 10-12 hours  and get done prior to annual pe per Dr Hudson  Call complete

## 2021-06-25 NOTE — TELEPHONE ENCOUNTER
Barbie Arteaga called the office in regards to he believes his blwk script has   Pt is asking can you please re order labs that way he get get labs prior to his physical on 21  Pt uses [facility]  He is aware orders will be in the system ,but would like a call back    Pt's number is [phone]

## 2021-06-28 ENCOUNTER — OFFICE VISIT (OUTPATIENT)
Dept: PHYSICAL THERAPY | Facility: CLINIC | Age: 41
End: 2021-06-28
Payer: COMMERCIAL

## 2021-06-28 DIAGNOSIS — S89.92XA INJURY OF LEFT KNEE, INITIAL ENCOUNTER: Primary | ICD-10-CM

## 2021-06-28 DIAGNOSIS — S83.512D RUPTURE OF ANTERIOR CRUCIATE LIGAMENT OF LEFT KNEE, SUBSEQUENT ENCOUNTER: ICD-10-CM

## 2021-06-28 PROCEDURE — 97110 THERAPEUTIC EXERCISES: CPT | Performed by: PHYSICAL THERAPIST

## 2021-06-28 PROCEDURE — 97530 THERAPEUTIC ACTIVITIES: CPT | Performed by: PHYSICAL THERAPIST

## 2021-06-28 PROCEDURE — L3010 FOOT LONGITUDINAL ARCH SUPPO: HCPCS | Performed by: PHYSICAL THERAPIST

## 2021-06-28 PROCEDURE — 97112 NEUROMUSCULAR REEDUCATION: CPT | Performed by: PHYSICAL THERAPIST

## 2021-06-28 NOTE — PROGRESS NOTES
Daily Note     Today's date: 2021  Patient name: Jeremy Lindquist  : 1980  MRN: [id number]  Referring provider: Amrik Chase MD  Dx:   Encounter Diagnosis     ICD-10-CM    1  Injury of left knee, initial encounter  S89  92XA    2  Rupture of anterior cruciate ligament of left knee, subsequent encounter  S83 512D                   Subjective: knee is feeling pretty good      Objective: See treatment diary below      Assessment: Tolerated treatment well  Patient demonstrated fatigue post treatment  Tolerated jogging well    Plan: Continue per plan of care        Precautions: ACL 3/8 PTB autograft, meniscus repair; limit flexion to 90 1st 3 wks      Manuals        Patella mobs                    Neuro Re-Ed          biodex catch          SLB - ball toss Foam 30 Green  Foam 30 [id number]       Foam tandem                    Ther Ex          Knee ext 90-45          Leg press 7 pl 3x10 7pl  3x10 7 pl 3x10       Step ups - lateral 10"x30 HEP        Cross over step up 6"x30 HEP        Wall sits: 10s Single x10   x10 nv       Step ups - frwd w  Arm drive 62"L53 72"X63        Heel  Raises single Single 3x10 Single  30 30       squats          Ham curls Ball x20 Ball   nv       Dead lifts 20# dbls x30 20#  dbls  x30 20# dbls       Single leg bent over row 20#2x10 20#  2x10 20# 2x10       bridges 30  30 home       Lunges; frwd, lat 20 ea  20 ea nv       Chair lunge reviewed         Ther Activity          Quad stretch 30sx2 30s x2        bike 10 10' 10'       stepper 5 5' 5'                 Cone touch 2x5 2x5 nv                 Side step sony 3' Blue   3laps sony 3'       TM jog intervals   30s x3       planks 10  5"x10        Modalities

## 2021-06-30 ENCOUNTER — OFFICE VISIT (OUTPATIENT)
Dept: FAMILY MEDICINE CLINIC | Facility: CLINIC | Age: 41
End: 2021-06-30
Payer: COMMERCIAL

## 2021-06-30 VITALS
TEMPERATURE: 96.7 F | HEIGHT: 72 IN | WEIGHT: 252 LBS | SYSTOLIC BLOOD PRESSURE: 134 MMHG | DIASTOLIC BLOOD PRESSURE: 98 MMHG | OXYGEN SATURATION: 98 % | BODY MASS INDEX: 34.13 KG/M2 | HEART RATE: 87 BPM

## 2021-06-30 DIAGNOSIS — I10 ESSENTIAL HYPERTENSION: ICD-10-CM

## 2021-06-30 DIAGNOSIS — R73.03 PREDIABETES: ICD-10-CM

## 2021-06-30 DIAGNOSIS — E78.5 HYPERLIPIDEMIA, UNSPECIFIED HYPERLIPIDEMIA TYPE: ICD-10-CM

## 2021-06-30 DIAGNOSIS — E66.9 OBESITY (BMI 30-39.9): ICD-10-CM

## 2021-06-30 DIAGNOSIS — K21.9 GASTROESOPHAGEAL REFLUX DISEASE, UNSPECIFIED WHETHER ESOPHAGITIS PRESENT: ICD-10-CM

## 2021-06-30 DIAGNOSIS — Z00.00 HEALTH CARE MAINTENANCE: Primary | ICD-10-CM

## 2021-06-30 PROCEDURE — 1036F TOBACCO NON-USER: CPT | Performed by: FAMILY MEDICINE

## 2021-06-30 PROCEDURE — 3008F BODY MASS INDEX DOCD: CPT | Performed by: FAMILY MEDICINE

## 2021-06-30 PROCEDURE — 99396 PREV VISIT EST AGE 40-64: CPT | Performed by: FAMILY MEDICINE

## 2021-06-30 PROCEDURE — 3725F SCREEN DEPRESSION PERFORMED: CPT | Performed by: FAMILY MEDICINE

## 2021-06-30 RX ORDER — HYDROCHLOROTHIAZIDE 12.5 MG/1
12.5 TABLET ORAL DAILY
Qty: 30 TABLET | Refills: 5 | Status: SHIPPED | OUTPATIENT
Start: 2021-06-30 | End: 2021-12-10

## 2021-06-30 RX ORDER — OMEGA-3/DHA/EPA/FISH OIL 60 MG-90MG
500 CAPSULE ORAL DAILY
COMMUNITY

## 2021-06-30 RX ORDER — ATORVASTATIN CALCIUM 10 MG/1
10 TABLET, FILM COATED ORAL DAILY
Qty: 30 TABLET | Refills: 5 | Status: SHIPPED | OUTPATIENT
Start: 2021-06-30 | End: 2021-12-23

## 2021-06-30 NOTE — PATIENT INSTRUCTIONS
Here for general PE and reviewed labs which show high cholesterol, prediabetes and HTN  Start losing weight to help via diet and exercise  Start atorvastatin 10 mg daily and monitor for mm cramps and avoid grapefruit juice  Start HTN med and recheck in 1 month  Rec low sugar diet to help with prediabetes  Francia Curry is stable  F-up with PT as directed for hx of acl injury and left meniscus injury  Use sunscreen and monitor for ticks  F-up with ortho as directed  O3FA 3 grams daily to help lower trigs

## 2021-07-05 NOTE — PROGRESS NOTES
PT Evaluation     Today's date: 3/15/2021  Patient name: Davidson Loya  : 1980  MRN: [id number]  Referring provider: Author Refugio MD  Dx:   Encounter Diagnosis     ICD-10-CM    1  Injury of left knee, initial encounter  S89  92XA Ambulatory referral to Physical Therapy                  Assessment  Assessment details: Davidson Loya is a 37 y o  male present with: Injury of left knee, initial encounter    Shira Ruiz has the above listed impairments and will benefit from skilled PT to improve deficits to return to prior level of function  Impairments: abnormal muscle firing, abnormal or restricted ROM, activity intolerance, impaired physical strength, lacks appropriate home exercise program and pain with function  Understanding of Dx/Px/POC: good   Prognosis: good    Goals  Impairment Goals  - Decrease pain   - Improve ROM to [facility]  - Increase strength     Functional Goals  - Return to Prior Level of Function  - Increase Functional Status Measure  - Patient will be independent with HEP    Plan  Patient would benefit from: skilled PT  Planned therapy interventions: joint mobilization, manual therapy, patient education, postural training, activity modification, abdominal trunk stabilization, body mechanics training, flexibility, functional ROM exercises, graded exercise, home exercise program, neuromuscular re-education, strengthening, stretching, therapeutic activities and therapeutic exercise  Frequency: 2x week  Duration in weeks: 12  Treatment plan discussed with: patient        Subjective Evaluation    History of Present Illness  Mechanism of injury: Pt injured knee skiing on   Knee gave out while skiing  Surgery was on 3/8/21  Has been taking OTC anti-inflam and acetaminophen  No prior knee injuries    Works as an , on his feet 75% of the day      Desires to return to active lifestyle    Pain  Current pain rating: 3  At best pain rating: 3  At worst pain ratin    Patient Goals  Patient goals for therapy: return to sport/leisure activities          Objective   presents to dept with crutches and IROM brace, PWB  mod edema noted L knee  No active drainage  Sensation grossly in tact to LT; slight decrease ant/lat aspect of knee  Decreased patellar mobility    PROM:  ext - 10 deg  flx 65 deg    Fair quad set  SLR: mod quad lag    (-) Garret's         Precautions: ACL 3/8 PTB autograft, meniscus repair; limit flexion to 90 1st 3 wks      Manuals 3/15            Patella mobs mb                                                   Neuro Re-Ed             Quad set IP            NMES prn                                                                              Ther Ex             SLR in brace IP 4 way           Heel slides IP            Ankle tband             Standing ham curl             Heel raises                                                    Ther Activity                                       Gait Training                                       Modalities English

## 2021-07-08 ENCOUNTER — APPOINTMENT (OUTPATIENT)
Dept: PHYSICAL THERAPY | Facility: CLINIC | Age: 41
End: 2021-07-08
Payer: COMMERCIAL

## 2021-07-14 ENCOUNTER — OFFICE VISIT (OUTPATIENT)
Dept: PHYSICAL THERAPY | Facility: CLINIC | Age: 41
End: 2021-07-14

## 2021-07-14 ENCOUNTER — OFFICE VISIT (OUTPATIENT)
Dept: PHYSICAL THERAPY | Facility: CLINIC | Age: 41
End: 2021-07-14
Payer: COMMERCIAL

## 2021-07-14 DIAGNOSIS — S89.92XA INJURY OF LEFT KNEE, INITIAL ENCOUNTER: Primary | ICD-10-CM

## 2021-07-14 DIAGNOSIS — S83.512D RUPTURE OF ANTERIOR CRUCIATE LIGAMENT OF LEFT KNEE, SUBSEQUENT ENCOUNTER: ICD-10-CM

## 2021-07-14 PROCEDURE — 97530 THERAPEUTIC ACTIVITIES: CPT | Performed by: PHYSICAL THERAPIST

## 2021-07-14 PROCEDURE — 97110 THERAPEUTIC EXERCISES: CPT | Performed by: PHYSICAL THERAPIST

## 2021-07-14 NOTE — PROGRESS NOTES
Daily Note     Today's date: 2021  Patient name: Michel Blair  : 1980  MRN: [id number]  Referring provider: Trace Cantu, *  Dx:   Encounter Diagnosis     ICD-10-CM    1  Injury of left knee, initial encounter  S89  92XA    2  Rupture of anterior cruciate ligament of left knee, subsequent encounter  S83 512D                   Subjective: knee feels pretty good, mild discomfort in patellar tendon and quad feels weak  Objective: See treatment diary below  Knee ROM wnl  Ant drawer (-)  Lachmans (-)  Good quad set    Pt needed vc's for hopping to land on mid foot and flex knees  No signif gait deviation noted with jogging    Assessment: Tolerated treatment well  Patient demonstrated fatigue post treatment      Plan: Continue per plan of care        Precautions: ACL 3/8 PTB autograft, meniscus repair; limit flexion to 90 1st 3 wks      Manuals       Patella mobs                    Neuro Re-Ed          biodex catch          SLB - ball toss Foam 30 Green  Foam 30 [id number]       Foam tandem                    Ther Ex          Knee ext 90-45    1 pl 3x10      Leg press 7 pl 3x10 7pl  3x10 7 pl 3x10       Step ups - lateral 10"x30 HEP        Cross over step up 6"x30 HEP        Wall sits: 10s Single x10   x10 nv rev      Step ups - frwd w  Arm drive 48"U99 42"H48        Heel  Raises single Single 3x10 Single  30 30       squats    rev      Ham curls Ball x20 Ball   nv       Dead lifts 20# dbls x30 20#  dbls  x30 20# dbls Single -rev      Single leg bent over row 20#2x10 20#  2x10 20# 2x10       bridges 30  30 home       Lunges; frwd, lat 20 ea  20 ea nv       Chair lunge reviewed         Ther Activity          Quad stretch 30sx2 30s x2        bike 10 10' 10' 10'      stepper 5 5' 5'                 Cone touch 2x5 2x5 nv                 Side step sony 3' Blue   3laps sony 3'       TM jog intervals   30s x3 60" x5      planks 10  5"x10        Retro jog    IP      Side shuffle    IP hopping    IP                          Modalities

## 2021-07-21 ENCOUNTER — OFFICE VISIT (OUTPATIENT)
Dept: PHYSICAL THERAPY | Facility: CLINIC | Age: 41
End: 2021-07-21
Payer: COMMERCIAL

## 2021-07-21 DIAGNOSIS — S89.92XA INJURY OF LEFT KNEE, INITIAL ENCOUNTER: Primary | ICD-10-CM

## 2021-07-21 PROCEDURE — 97112 NEUROMUSCULAR REEDUCATION: CPT | Performed by: PHYSICAL THERAPIST

## 2021-07-21 PROCEDURE — 97530 THERAPEUTIC ACTIVITIES: CPT | Performed by: PHYSICAL THERAPIST

## 2021-07-21 PROCEDURE — 97110 THERAPEUTIC EXERCISES: CPT | Performed by: PHYSICAL THERAPIST

## 2021-07-21 NOTE — PROGRESS NOTES
Daily Note     Today's date: 2021  Patient name: Lacy Flowers  : 1980  MRN: [id number]  Referring provider: Rachel Allen, *  Dx:   Encounter Diagnosis     ICD-10-CM    1  Injury of left knee, initial encounter  S89  92XA                   Subjective: pt reports continued improvement      Objective: See treatment diary below  Needed vc'ing on TM to increase L knee extension during terminal swing    Assessment: Tolerated treatment well  Patient demonstrated fatigue post treatment      Plan: Continue per plan of care        Precautions: ACL 3/8 PTB autograft, meniscus repair; limit flexion to 90 1st 3 wks      Manuals      Patella mobs                    Neuro Re-Ed          biodex catch          SLB - ball toss Foam 30 Green  Foam 30 [id number]       Foam tandem          slb trunk rotation      Blue 3x10 ea     Cone touch     3x5     Ther Ex          Knee ext 90-45    1 pl 3x10 1 pl 3x10     Leg press 7 pl 3x10 7pl  3x10 7 pl 3x10  7 pl 3x10     Step ups - lateral 10"x30 HEP        Cross over step up 6"x30 HEP        Wall sits: 10s Single x10   x10 nv rev      Step ups - frwd w  Arm drive 68"N72 54"K49        Heel  Raises single Single 3x10 Single  30 30       squats    rev      Ham curls Ball x20 Ball   nv  7 pl 3x10     Dead lifts 20# dbls x30 20#  dbls  x30 20# dbls Single -rev nv     Single leg bent over row 20#2x10 20#  2x10 20# 2x10       bridges 30  30 home       Lunges; frwd, lat 20 ea  20 ea nv       Chair lunge reviewed         Ther Activity          Quad stretch 30sx2 30s x2        bike 10 10' 10' 10' 10'     stepper 5 5' 5'                                     Side step sony 3' Blue   3laps sony 3'       TM jog intervals   30s x3 60" x5 1 min x2, 30/30s x7     planks 10  5"x10   W/leg lift 20 ea     Retro jog    IP      Side shuffle    IP      hopping    IP 3x10     skipping     3'     Side lunge hop     x10     Modalities

## 2021-07-23 ENCOUNTER — OFFICE VISIT (OUTPATIENT)
Dept: OBGYN CLINIC | Facility: MEDICAL CENTER | Age: 41
End: 2021-07-23
Payer: COMMERCIAL

## 2021-07-23 VITALS
SYSTOLIC BLOOD PRESSURE: 131 MMHG | HEIGHT: 72 IN | HEART RATE: 87 BPM | WEIGHT: 259 LBS | BODY MASS INDEX: 35.08 KG/M2 | DIASTOLIC BLOOD PRESSURE: 92 MMHG

## 2021-07-23 DIAGNOSIS — S83.512D RUPTURE OF ANTERIOR CRUCIATE LIGAMENT OF LEFT KNEE, SUBSEQUENT ENCOUNTER: Primary | ICD-10-CM

## 2021-07-23 PROCEDURE — 99213 OFFICE O/P EST LOW 20 MIN: CPT | Performed by: ORTHOPAEDIC SURGERY

## 2021-07-23 NOTE — PROGRESS NOTES
Orthopaedic Surgery - Office Note  Jax Will (37 y o  male)   : 1980   MRN: [id number]  Encounter Date: 2021    Chief Complaint   Patient presents with    Left Knee - Follow-up       Assessment / Plan  S/p left knee arthroscopy with ACL reconstruction using patella tendon autograft and medial meniscus repair, with good progression     · Continue outpatient PT and progress per protocol   · Continue with ice and analgesics prn  Return in about 6 weeks (around 9/3/2021)  History of Present Illness  Jax Will is a 37 y o  male who presents for follow up S/p left knee arthroscopy with ACL reconstruction using patella tendon autograft and medial meniscus repair on 3/08/21  He has been attending PT which he feels is going well  He has begun jogging which is going well, he feels stable but note some muscular fatigue  He does get some swelling following his PT sessions  Overall, he continues to be very happy with his surgical outcomes  Review of Systems  Pertinent items are noted in HPI  All other systems were reviewed and are negative  Physical Exam  /92   Pulse 87   Ht 6' (1 829 m)   Wt 117 kg (259 lb)   BMI 35 13 kg/m²   Cons: Appears well  No apparent distress  Psych: Alert  Oriented x3  Mood and affect normal   Eyes: PERRLA, EOMI  Resp: Normal effort  No audible wheezing or stridor  CV: Palpable pulse  No discernable arrhythmia  No LE edema  Lymph:  No palpable cervical, axillary, or inguinal lymphadenopathy  Skin: Warm  No palpable masses  No visible lesions  Neuro: Normal muscle tone  Normal and symmetric DTR's  Left Knee Exam  Alignment:  Normal knee alignment  Inspection:  No swelling  No erythema  No ecchymosis  mild expected quad muscle atrophy  Incision healed  Palpation:  No tenderness at joint line  trace effusion  ROM:  Knee Extension 0  Knee Flexion 135  Strength:  Able to SLR without lag  Able to actively extend knee against gravity    Stability: (-) Lachman (1A)  (-) Pivot-shift  Tests:  No pertinent positive or negative tests  Patella:  Normal patellar mobility  Neurovascular:  Sensation intact in Ax/R/M/U nerve distributions  2+ radial pulse  Gait:  Normal     Studies Reviewed  No studies to review    Procedures  No procedures today  Medical, Surgical, Family, and Social History  The patient's medical history, family history, and social history, were reviewed and updated as appropriate  Past Medical History:   Diagnosis Date    Concussion     unsure of LOC- skiing accident 2/21/21    Wears contact lenses        Past Surgical History:   Procedure Laterality Date    EGD      KNEE ARTHROSCOPY W/ ACL RECONSTRUCTION  03/2021    ORIF TIBIA & FIBULA FRACTURES Right     right ankle    WA KNEE SCOPE,AID ANT CRUCIATE REPAIR Left 3/8/2021    Procedure: ARTHROSCOPIC RECONSTRUCTION ANTERIOR CRUCIATE LIGAMENT (ACL) WITH AUTOGRAFT (patellar tendon); MENISCUS REPAIR;  Surgeon: Mike Castro MD;  Location: [facility];  Service: Orthopedics    TIBIA FRACTURE SURGERY         History reviewed  No pertinent family history      Social History     Occupational History    Not on file   Tobacco Use    Smoking status: Never Smoker    Smokeless tobacco: Never Used   Vaping Use    Vaping Use: Never used   Substance and Sexual Activity    Alcohol use: Yes     Comment: socially    Drug use: Never    Sexual activity: Yes     Partners: Female       No Known Allergies      Current Outpatient Medications:     Ascorbic Acid (Vitamin C) 500 MG CAPS, Take 1 capsule by mouth daily, Disp: , Rfl:     atorvastatin (LIPITOR) 10 mg tablet, Take 1 tablet (10 mg total) by mouth daily, Disp: 30 tablet, Rfl: 5    famotidine (PEPCID) 40 MG tablet, TAKE 1 TABLET BY MOUTH EVERY DAY, Disp: 90 tablet, Rfl: 2    Garlic 10 MG CAPS, Take 50 mg by mouth daily, Disp: , Rfl:     hydrochlorothiazide (HYDRODIURIL) 12 5 mg tablet, Take 1 tablet (12 5 mg total) by mouth daily, Disp: 30 tablet, Rfl: 5    Multiple Vitamin (multivitamin) tablet, Take 1 tablet by mouth daily, Disp: , Rfl:     Omega-3 Fatty Acids (Fish Oil) 500 MG CAPS, Take 500 mg by mouth daily, Disp: , Rfl:     naproxen (NAPROSYN) 500 mg tablet, Take 1 tablet (500 mg total) by mouth 2 (two) times a day with meals (Patient not taking: Reported on 5/14/2021), Disp: 60 tablet, Rfl: 0      [employer]    I,:  Stella Gitelman am acting as a scribe while in the presence of the attending physician :       I,:  Bev Martinez MD personally performed the services described in this documentation    as scribed in my presence :

## 2021-07-28 ENCOUNTER — OFFICE VISIT (OUTPATIENT)
Dept: FAMILY MEDICINE CLINIC | Facility: CLINIC | Age: 41
End: 2021-07-28
Payer: COMMERCIAL

## 2021-07-28 ENCOUNTER — OFFICE VISIT (OUTPATIENT)
Dept: PHYSICAL THERAPY | Facility: CLINIC | Age: 41
End: 2021-07-28
Payer: COMMERCIAL

## 2021-07-28 VITALS
WEIGHT: 241.6 LBS | SYSTOLIC BLOOD PRESSURE: 130 MMHG | DIASTOLIC BLOOD PRESSURE: 90 MMHG | OXYGEN SATURATION: 98 % | BODY MASS INDEX: 32.02 KG/M2 | HEART RATE: 94 BPM | TEMPERATURE: 96 F | HEIGHT: 73 IN

## 2021-07-28 DIAGNOSIS — E78.5 HYPERLIPIDEMIA, UNSPECIFIED HYPERLIPIDEMIA TYPE: ICD-10-CM

## 2021-07-28 DIAGNOSIS — S83.512D RUPTURE OF ANTERIOR CRUCIATE LIGAMENT OF LEFT KNEE, SUBSEQUENT ENCOUNTER: ICD-10-CM

## 2021-07-28 DIAGNOSIS — E66.9 OBESITY (BMI 30-39.9): ICD-10-CM

## 2021-07-28 DIAGNOSIS — S89.92XA INJURY OF LEFT KNEE, INITIAL ENCOUNTER: Primary | ICD-10-CM

## 2021-07-28 DIAGNOSIS — I10 ESSENTIAL HYPERTENSION: Primary | ICD-10-CM

## 2021-07-28 PROCEDURE — 99214 OFFICE O/P EST MOD 30 MIN: CPT | Performed by: FAMILY MEDICINE

## 2021-07-28 PROCEDURE — 1036F TOBACCO NON-USER: CPT | Performed by: FAMILY MEDICINE

## 2021-07-28 PROCEDURE — 97110 THERAPEUTIC EXERCISES: CPT | Performed by: PHYSICAL THERAPIST

## 2021-07-28 PROCEDURE — 3075F SYST BP GE 130 - 139MM HG: CPT | Performed by: FAMILY MEDICINE

## 2021-07-28 PROCEDURE — 3008F BODY MASS INDEX DOCD: CPT | Performed by: FAMILY MEDICINE

## 2021-07-28 PROCEDURE — 3080F DIAST BP >= 90 MM HG: CPT | Performed by: FAMILY MEDICINE

## 2021-07-28 PROCEDURE — 97140 MANUAL THERAPY 1/> REGIONS: CPT | Performed by: PHYSICAL THERAPIST

## 2021-07-28 PROCEDURE — 97530 THERAPEUTIC ACTIVITIES: CPT | Performed by: PHYSICAL THERAPIST

## 2021-07-28 PROCEDURE — L3010 FOOT LONGITUDINAL ARCH SUPPO: HCPCS | Performed by: PHYSICAL THERAPIST

## 2021-07-28 NOTE — PROGRESS NOTES
BMI Counseling: Body mass index is 32 32 kg/m²  The BMI is above normal  Nutrition recommendations include reducing portion sizes, decreasing overall calorie intake, 3-5 servings of fruits/vegetables daily, reducing fast food intake, consuming healthier snacks, decreasing soda and/or juice intake, moderation in carbohydrate intake, increasing intake of lean protein, reducing intake of saturated fat and trans fat and reducing intake of cholesterol  Exercise recommendations include exercising 3-5 times per week

## 2021-07-28 NOTE — PROGRESS NOTES
Assessment/Plan:  Chief Complaint   Patient presents with    Follow-up     1 month     Patient Instructions   Doing better with BP and also lost weight and will continue with same meds as directed for HTN and hyperlipidemia  Rec recheck in 3 months for BP check  Continue 1500 calorie ADA diet  No problem-specific Assessment & Plan notes found for this encounter  Diagnoses and all orders for this visit:    Essential hypertension    Hyperlipidemia, unspecified hyperlipidemia type    Obesity (BMI 30-39  9)          Subjective:      Patient ID: Nate Castillo is a 37 y o  male  Here for recheck on BP and is better and lost weight  Following 1500 calorie ADA diet  The following portions of the patient's history were reviewed and updated as appropriate: allergies, current medications, past family history, past medical history, past social history, past surgical history and problem list     Review of Systems   Constitutional: Negative  HENT: Negative  Eyes: Negative  Respiratory: Negative  Cardiovascular: Negative  Gastrointestinal: Negative  Endocrine: Negative  Genitourinary: Negative  Musculoskeletal: Negative  Skin: Negative  Allergic/Immunologic: Negative  Neurological: Negative  Hematological: Negative  Psychiatric/Behavioral: Negative  Objective:      /90   Pulse 94   Temp (!) 96 °F (35 6 °C) (Temporal)   Ht 6' 0 5" (1 842 m)   Wt 110 kg (241 lb 9 6 oz)   SpO2 98%   BMI 32 32 kg/m²          Physical Exam  Constitutional:       Appearance: He is well-developed  HENT:      Head: Normocephalic and atraumatic  Eyes:      Conjunctiva/sclera: Conjunctivae normal       Pupils: Pupils are equal, round, and reactive to light  Cardiovascular:      Rate and Rhythm: Normal rate and regular rhythm  Heart sounds: Normal heart sounds  Pulmonary:      Effort: Pulmonary effort is normal       Breath sounds: Normal breath sounds  Musculoskeletal:         General: Normal range of motion  Cervical back: Normal range of motion and neck supple  Skin:     General: Skin is warm and dry  Neurological:      Mental Status: He is alert and oriented to person, place, and time  Deep Tendon Reflexes: Reflexes are normal and symmetric     Psychiatric:         Behavior: Behavior normal

## 2021-07-28 NOTE — PATIENT INSTRUCTIONS
Doing better with BP and also lost weight and will continue with same meds as directed for HTN and hyperlipidemia  Rec recheck in 3 months for BP check  Continue 1500 calorie ADA diet

## 2021-07-28 NOTE — PROGRESS NOTES
Daily Note     Today's date: 2021  Patient name: Reyna Bella  : 1980  MRN: [id number]  Referring provider: Julieta Velazquez, *  Dx:   Encounter Diagnosis     ICD-10-CM    1  Injury of left knee, initial encounter  S89  92XA    2  Rupture of anterior cruciate ligament of left knee, subsequent encounter  S83 512D                   Subjective: pt reports discomfort in patellar tendon region and lateral      Objective: See treatment diary below  Modified rx due to knee pain    Assessment: Tolerated treatment well  Patient demonstrated fatigue post treatment      Plan: Continue per plan of care        Precautions: ACL 3/8 PTB autograft, meniscus repair; limit flexion to 90 1st 3 wks      Manuals     Patella mobs      mb    Laser pat tendon      mb 4' max    GT patellar tendon      mb    Neuro Re-Ed          biodex catch          SLB - ball toss Foam 30 Green  Foam 30 [id number]       Foam tandem          slb trunk rotation      Blue 3x10 ea     Cone touch     3x5     Ther Ex          Knee ext 90-45    1 pl 3x10 1 pl 3x10 1 pl 3x10    Leg press 7 pl 3x10 7pl  3x10 7 pl 3x10  7 pl 3x10 7 pl 3x10    Step ups - lateral 10"x30 HEP        Cross over step up 6"x30 HEP        Wall sits: 10s Single x10   x10 nv rev      Step ups - frwd w  Arm drive 81"N51 45"X92        Heel  Raises single Single 3x10 Single  30 30   30    squats    rev      Ham curls Ball x20 Ball   nv  7 pl 3x10 7 pl 3x10    Dead lifts 20# dbls x30 20#  dbls  x30 20# dbls Single -rev nv     Single leg bent over row 20#2x10 20#  2x10 20# 2x10       bridges 30  30 home       Lunges; frwd, lat 20 ea  20 ea nv       Chair lunge reviewed         Ther Activity          Quad stretch 30sx2 30s x2    rev    bike 10 10' 10' 10' 10' 10'    stepper 5 5' 5'                                     Side step sony 3' Blue   3laps sony 3'       TM jog intervals   30s x3 60" x5 1 min x2, 30/30s x7 10'    planks 10  5"x10   W/leg lift 20 ea Retro jog    IP      Side shuffle    IP      hopping    IP 3x10     skipping     3'     Side lunge hop     x10     Modalities

## 2021-08-04 ENCOUNTER — OFFICE VISIT (OUTPATIENT)
Dept: PHYSICAL THERAPY | Facility: CLINIC | Age: 41
End: 2021-08-04
Payer: COMMERCIAL

## 2021-08-04 DIAGNOSIS — S83.512D RUPTURE OF ANTERIOR CRUCIATE LIGAMENT OF LEFT KNEE, SUBSEQUENT ENCOUNTER: ICD-10-CM

## 2021-08-04 DIAGNOSIS — S89.92XA INJURY OF LEFT KNEE, INITIAL ENCOUNTER: Primary | ICD-10-CM

## 2021-08-04 PROCEDURE — 97140 MANUAL THERAPY 1/> REGIONS: CPT | Performed by: PHYSICAL THERAPIST

## 2021-08-04 PROCEDURE — 97110 THERAPEUTIC EXERCISES: CPT | Performed by: PHYSICAL THERAPIST

## 2021-08-04 PROCEDURE — 97530 THERAPEUTIC ACTIVITIES: CPT | Performed by: PHYSICAL THERAPIST

## 2021-08-04 PROCEDURE — 97112 NEUROMUSCULAR REEDUCATION: CPT | Performed by: PHYSICAL THERAPIST

## 2021-08-04 NOTE — PROGRESS NOTES
Daily Note     Today's date: 2021  Patient name: Napoleon Rodrigues  : 1980  MRN: [id number]  Referring provider: Eilane Mcguire, *  Dx:   Encounter Diagnosis     ICD-10-CM    1  Injury of left knee, initial encounter  S89  92XA    2  Rupture of anterior cruciate ligament of left knee, subsequent encounter  S83 512D                   Subjective: knee is feeling pretty good  Hasn't run since last visit      Objective: See treatment diary below      Assessment: Tolerated treatment well  Patient demonstrated fatigue post treatment    C/o ant knee pain with bridges and prone planks  Plan: Continue per plan of care        Precautions: ACL 3/8 PTB autograft, meniscus repair; limit flexion to 90 1st 3 wks      Manuals    Patella mobs      mb    Laser pat tendon      mb 4' max 4'   GT patellar tendon      mb    Neuro Re-Ed          biodex catch          SLB - ball toss Foam 30 Green  Foam 30 [id number]    30 ea   Foam tandem          slb trunk rotation      Blue 3x10 ea     Cone touch     3x5     Ther Ex          Knee ext 90-45    1 pl 3x10 1 pl 3x10 1 pl 3x10 1 pl 3x10   Leg press 7 pl 3x10 7pl  3x10 7 pl 3x10  7 pl 3x10 7 pl 3x10 7 pl 3x10   Step ups - lateral 10"x30 HEP     Rapid 30s x2   Cross over step up 6"x30 HEP        Wall sits: 10s Single x10   x10 nv rev      Step ups - frwd w  Arm drive 34"Z08 86"D91        Heel  Raises single Single 3x10 Single  30 30   30 30 ea   squats    rev   w hop 3x10   Ham curls Ball x20 Ball   nv  7 pl 3x10 7 pl 3x10 7 pl 3x10   Dead lifts 20# dbls x30 20#  dbls  x30 20# dbls Single -rev nv     Single leg bent over row 20#2x10 20#  2x10 20# 2x10    Leg swings 30 ea   bridges 30  30 home    w SLR 2x10   Lunges; frwd, lat 20 ea  20 ea nv    20 ea   Chair lunge reviewed         Ther Activity          Quad stretch 30sx2 30s x2    rev 30s x2   bike 10 10' 10' 10' 10' 10' 10'   stepper 5 5' 5'       elliptical       10'                       Side step sony 3' Blue   3laps sony 3'       TM jog intervals   30s x3 60" x5 1 min x2, 30/30s x7 10' -   planks 10  5"x10   W/leg lift 20 ea  w leg lift 5 ea   Retro jog    IP      Side shuffle    IP      hopping    IP 3x10     skipping     3'     Side lunge hop     x10     Modalities

## 2021-08-10 ENCOUNTER — APPOINTMENT (OUTPATIENT)
Dept: PHYSICAL THERAPY | Facility: CLINIC | Age: 41
End: 2021-08-10
Payer: COMMERCIAL

## 2021-08-20 ENCOUNTER — OFFICE VISIT (OUTPATIENT)
Dept: OBGYN CLINIC | Facility: CLINIC | Age: 41
End: 2021-08-20
Payer: COMMERCIAL

## 2021-08-20 VITALS
WEIGHT: 241 LBS | SYSTOLIC BLOOD PRESSURE: 143 MMHG | HEART RATE: 65 BPM | BODY MASS INDEX: 32.64 KG/M2 | HEIGHT: 72 IN | DIASTOLIC BLOOD PRESSURE: 89 MMHG

## 2021-08-20 DIAGNOSIS — M19.171 POST-TRAUMATIC ARTHRITIS OF RIGHT ANKLE: Primary | ICD-10-CM

## 2021-08-20 PROCEDURE — 3077F SYST BP >= 140 MM HG: CPT | Performed by: ORTHOPAEDIC SURGERY

## 2021-08-20 PROCEDURE — 3008F BODY MASS INDEX DOCD: CPT | Performed by: ORTHOPAEDIC SURGERY

## 2021-08-20 PROCEDURE — 1036F TOBACCO NON-USER: CPT | Performed by: ORTHOPAEDIC SURGERY

## 2021-08-20 PROCEDURE — 3079F DIAST BP 80-89 MM HG: CPT | Performed by: ORTHOPAEDIC SURGERY

## 2021-08-20 PROCEDURE — 99213 OFFICE O/P EST LOW 20 MIN: CPT | Performed by: ORTHOPAEDIC SURGERY

## 2021-08-20 NOTE — PROGRESS NOTES
HERMILO Heredia  Attending, Orthopaedic Surgery  [facility] [address] [facility]      ORTHOPAEDIC FOOT AND ANKLE CLINIC VISIT     Assessment:     Encounter Diagnosis   Name Primary?  Post-traumatic arthritis of right ankle Yes            Plan:   · The patient verbalized understanding of exam findings and treatment plan  We engaged in the shared decision-making process and treatment options were discussed at length with the patient  Surgical and conservative management discussed today along with risks and benefits  · He received significant relief with the ankle injection 3 months ago  He did not obtain the [place] brace which was prescribed last visit because he was concerned it would be too restricting for his lifestyle  He has been wearing an ASO brace which has been helping  · We do recommend he give the [place] a try  · He remains a candidate for ankle replacement versus ankle arthrodesis if the nonoperative treatments fail to provide him with the desired relief  · Follow-up PRN      History of Present Illness:   Chief Complaint: Right ankle pain  Cherie Leal is a 37 y o  male who is being seen in follow-up for Right ankle arthritis  When we last saw he we recommended injection and [place] brace  He has had lasting relief with the injection  Residual pain is localized at anterior ankle with minimal radiating  Pain/symptom timing:  Worse during the day when active  Pain/symptom context:  Worse with activites and work  Pain/symptom modifying factors:  Rest makes better, activities make worse  Pain/symptom associated signs/symptoms: none    Prior treatment   · NSAIDsYes   · Injections Yes   · Bracing/Orthotics Yes    · Physical Therapy Yes      Orthopedic Surgical History:   See below    Past Medical, Surgical and Social History:  Past Medical History:  has a past medical history of Concussion and Wears contact lenses    Problem List: does not have any pertinent problems on file  Past Surgical History:  has a past surgical history that includes EGD; Tibia fracture surgery; ORIF tibia & fibula fractures (Right); pr knee scope,aid ant cruciate repair (Left, 3/8/2021); and Knee arthroscopy w/ ACL reconstruction (03/2021)  Family History: family history is not on file  Social History:  reports that he has never smoked  He has never used smokeless tobacco  He reports current alcohol use  He reports that he does not use drugs  Current Medications: has a current medication list which includes the following prescription(s): vitamin c, atorvastatin, famotidine, garlic, hydrochlorothiazide, multivitamin, and fish oil  Allergies: has No Known Allergies  Review of Systems:  General- denies fever/chills  HEENT- denies hearing loss or sore throat  Eyes- denies eye pain or visual disturbances, denies red eyes  Respiratory- denies cough or SOB  Cardio- denies chest pain or palpitations  GI- denies abdominal pain  Endocrine- denies urinary frequency  Urinary- denies pain with urination  Musculoskeletal- Negative except noted above  Skin- denies rashes or wounds  Neurological- denies dizziness or headache  Psychiatric- denies anxiety or difficulty concentrating    Physical Exam:   /89 (BP Location: Right arm, Patient Position: Sitting, Cuff Size: Adult)   Pulse 65   Ht 6' (1 829 m)   Wt 109 kg (241 lb)   BMI 32 69 kg/m²   General/Constitutional: No apparent distress: well-nourished and well developed  Eyes: normal ocular motion  Lymphatic: No appreciable lymphadenopathy  Respiratory: Non-labored breathing  Vascular: No edema, swelling or tenderness, except as noted in detailed exam   Integumentary: No impressive skin lesions present, except as noted in detailed exam   Neuro: No ataxia or tremors noted  Psych: Normal mood and affect, oriented to person, place and time  Appropriate affect  Musculoskeletal: Normal, except as noted in detailed exam and in HPI      Examination Right    Gait Antalgic   Musculoskeletal Tender to palpation at anterior ankle    Skin Normal   Well-healed incisions  Nails Normal    Range of Motion  10 degrees dorsiflexion, 20 degrees plantarflexion  Subtalar motion: normal    Stability Stable    Muscle Strength 5/5 tibialis anterior  5/5 gastrocnemius-soleus  5/5 posterior tibialis  5/5 peroneal/eversion strength  5/5 EHL  5/5 FHL    Neurologic Normal    Sensation  Intact to light touch throughout sural, saphenous, superficial peroneal, deep peroneal and medial/lateral plantar nerve distributions  Counselor-Salo 5 07 filament (10g) testing deferred  Cardiovascular Brisk capillary refill < 2 seconds,intact DP and PT pulses    Special Tests None      Imaging Studies:   No new imaging    Scribe Attestation    I,:  Alex Brown PA-C am acting as a scribe while in the presence of the attending physician :       I,:  Eunice Velazquez MD personally performed the services described in this documentation    as scribed in my presence :               Betty Patten Lachman, MD  Foot & Ankle Surgery   Department of [address] [place] [address]      I personally performed the service  Betty Patten Lachman, MD

## 2021-08-25 ENCOUNTER — APPOINTMENT (OUTPATIENT)
Dept: PHYSICAL THERAPY | Facility: CLINIC | Age: 41
End: 2021-08-25
Payer: COMMERCIAL

## 2021-09-01 ENCOUNTER — OFFICE VISIT (OUTPATIENT)
Dept: PHYSICAL THERAPY | Facility: CLINIC | Age: 41
End: 2021-09-01
Payer: COMMERCIAL

## 2021-09-01 DIAGNOSIS — S89.92XA INJURY OF LEFT KNEE, INITIAL ENCOUNTER: Primary | ICD-10-CM

## 2021-09-01 DIAGNOSIS — S83.512D RUPTURE OF ANTERIOR CRUCIATE LIGAMENT OF LEFT KNEE, SUBSEQUENT ENCOUNTER: ICD-10-CM

## 2021-09-01 PROCEDURE — 97110 THERAPEUTIC EXERCISES: CPT | Performed by: PHYSICAL THERAPIST

## 2021-09-01 PROCEDURE — 97530 THERAPEUTIC ACTIVITIES: CPT | Performed by: PHYSICAL THERAPIST

## 2021-09-01 NOTE — PROGRESS NOTES
DC summary     Today's date: 2021  Patient name: Connie Cooper  : 1980  MRN: [id number]  Referring provider: Aakash Prieto, *  Dx:   Encounter Diagnosis     ICD-10-CM    1  Injury of left knee, initial encounter  S89  92XA    2  Rupture of anterior cruciate ligament of left knee, subsequent encounter  S83 512D                   Subjective: reports that knee is feeling good  Did some hiking in [address] (12 mile hikes) with only mild soreness, he did not feel limited at all  Objective: See treatment diary below  Goals have been met  Strength and ROM are wnl  See FOTO    IP in 90 deg rot hopping, lateral lunge hop and hold and twist hopping with shoulders facing forward  Assessment: Tolerated treatment well   Patient is ready for DC to HEP      Plan: DC to HEP     Precautions: ACL 3/8 PTB autograft, meniscus repair; limit flexion to 90 1st 3 wks      Manuals    Patella mobs      mb     Laser pat tendon      mb 4' max 4'    GT patellar tendon      mb     Neuro Re-Ed           biodex catch           SLB - ball toss Foam 30 Green  Foam 30 [id number]    30 ea    Foam tandem           slb trunk rotation      Blue 3x10 ea      Cone touch     3x5      Ther Ex           Knee ext 90-45    1 pl 3x10 1 pl 3x10 1 pl 3x10 1 pl 3x10 1 pl 3x10   Leg press 7 pl 3x10 7pl  3x10 7 pl 3x10  7 pl 3x10 7 pl 3x10 7 pl 3x10 7 pl 3x10   Step ups - lateral 10"x30 HEP     Rapid 30s x2    Cross over step up 6"x30 HEP         Wall sits: 10s Single x10   x10 nv rev       Step ups - frwd w  Arm [address] 86"S61 01"R41         Heel  Raises single Single 3x10 Single  30 30   30 30 ea    squats    rev   w hop 3x10    Ham curls Ball x20 Ball   nv  7 pl 3x10 7 pl 3x10 7 pl 3x10    Dead lifts 20# dbls x30 20#  dbls  x30 20# dbls Single -rev nv      Single leg bent over row 20#2x10 20#  2x10 20# 2x10    Leg swings 30 ea    bridges 30  30 home    w SLR 2x10    Lunges; frwd, lat 20 ea  20 ea nv 20 ea    Chair lunge reviewed          Ther Activity           Quad stretch 30sx2 30s x2    rev 30s x2    bike 10 10' 10' 10' 10' 10' 10' 10'   stepper 5 5' 5'        elliptical       10'                          Side step sony 3' Blue   3laps sony 3'        TM jog intervals   30s x3 60" x5 1 min x2, 30/30s x7 10' -    planks 10  5"x10   W/leg lift 20 ea  w leg lift 5 ea    Retro jog    IP       Side shuffle    IP       hopping    IP 3x10      skipping     3'      Side lunge hop     x10      Modalities

## 2021-09-10 ENCOUNTER — LAB (OUTPATIENT)
Dept: LAB | Facility: HOSPITAL | Age: 41
End: 2021-09-10
Attending: OBSTETRICS & GYNECOLOGY
Payer: COMMERCIAL

## 2021-09-10 DIAGNOSIS — Z11.3 SCREENING EXAMINATION FOR VENEREAL DISEASE: ICD-10-CM

## 2021-09-10 DIAGNOSIS — Z11.4 SCREENING FOR HUMAN IMMUNODEFICIENCY VIRUS: ICD-10-CM

## 2021-09-10 DIAGNOSIS — Z11.59 SCREENING EXAMINATION FOR POLIOMYELITIS: ICD-10-CM

## 2021-09-10 DIAGNOSIS — Z31.441 ENCOUNTER FOR TESTING OF MALE PARTNER OF FEMALE WITH RECURRENT PREGNANCY LOSS: ICD-10-CM

## 2021-09-10 LAB
HBV SURFACE AG SER QL: NORMAL
HCV AB SER QL: NORMAL

## 2021-09-10 PROCEDURE — 87340 HEPATITIS B SURFACE AG IA: CPT

## 2021-09-10 PROCEDURE — 88230 TISSUE CULTURE LYMPHOCYTE: CPT

## 2021-09-10 PROCEDURE — 87491 CHLMYD TRACH DNA AMP PROBE: CPT

## 2021-09-10 PROCEDURE — 86803 HEPATITIS C AB TEST: CPT

## 2021-09-10 PROCEDURE — 86592 SYPHILIS TEST NON-TREP QUAL: CPT

## 2021-09-10 PROCEDURE — 87591 N.GONORRHOEAE DNA AMP PROB: CPT

## 2021-09-10 PROCEDURE — 36415 COLL VENOUS BLD VENIPUNCTURE: CPT

## 2021-09-10 PROCEDURE — 87389 HIV-1 AG W/HIV-1&-2 AB AG IA: CPT

## 2021-09-10 PROCEDURE — 88262 CHROMOSOME ANALYSIS 15-20: CPT

## 2021-09-11 LAB
C TRACH DNA SPEC QL NAA+PROBE: NEGATIVE
N GONORRHOEA DNA SPEC QL NAA+PROBE: NEGATIVE

## 2021-09-12 LAB — HIV 1+2 AB+HIV1 P24 AG SERPL QL IA: NORMAL

## 2021-09-13 LAB — RPR SER QL: NORMAL

## 2021-09-17 ENCOUNTER — OFFICE VISIT (OUTPATIENT)
Dept: OBGYN CLINIC | Facility: MEDICAL CENTER | Age: 41
End: 2021-09-17
Payer: COMMERCIAL

## 2021-09-17 VITALS
HEART RATE: 82 BPM | SYSTOLIC BLOOD PRESSURE: 138 MMHG | DIASTOLIC BLOOD PRESSURE: 97 MMHG | HEIGHT: 72 IN | WEIGHT: 259 LBS | BODY MASS INDEX: 35.08 KG/M2

## 2021-09-17 DIAGNOSIS — S83.512D RUPTURE OF ANTERIOR CRUCIATE LIGAMENT OF LEFT KNEE, SUBSEQUENT ENCOUNTER: Primary | ICD-10-CM

## 2021-09-17 PROCEDURE — 3080F DIAST BP >= 90 MM HG: CPT | Performed by: ORTHOPAEDIC SURGERY

## 2021-09-17 PROCEDURE — 3008F BODY MASS INDEX DOCD: CPT | Performed by: ORTHOPAEDIC SURGERY

## 2021-09-17 PROCEDURE — 99213 OFFICE O/P EST LOW 20 MIN: CPT | Performed by: ORTHOPAEDIC SURGERY

## 2021-09-17 PROCEDURE — 1036F TOBACCO NON-USER: CPT | Performed by: ORTHOPAEDIC SURGERY

## 2021-09-17 PROCEDURE — 3075F SYST BP GE 130 - 139MM HG: CPT | Performed by: ORTHOPAEDIC SURGERY

## 2021-09-17 NOTE — PROGRESS NOTES
Orthopaedic Surgery - Office Note  German Mccullough (37 y o  male)   : 1980   MRN: [id number]  Encounter Date: 2021    Chief Complaint   Patient presents with    Left Knee - Follow-up       Assessment / Plan  S/p left knee arthroscopy with ACL reconstruction using patella tendon autograft and medial meniscus repair, with good progression     · Activity as tolerated   · Continued to build up quad strength  · Encouraged him to keep up with ACL injury prevention exerecises  Return in about 2 months (around 2021)  History of Present Illness  German Mccullough is a 37 y o  male who presents for follow up S/p left knee arthroscopy with ACL reconstruction using patella tendon autograft and medial meniscus repair on 3/08/2021  He was discharged from PT and has remained compliant with his HEP  He does workout on his own and is working on regaining quad strength  He denies any recent pain or swelling  He states he feels stable as he has begun running  He offers no complaints during today's visit  He remains happy with his surgical outcomes  Review of Systems  Pertinent items are noted in HPI  All other systems were reviewed and are negative  Physical Exam  /97   Pulse 82   Ht 6' (1 829 m)   Wt 117 kg (259 lb)   BMI 35 13 kg/m²   Cons: Appears well  No apparent distress  Psych: Alert  Oriented x3  Mood and affect normal   Eyes: PERRLA, EOMI  Resp: Normal effort  No audible wheezing or stridor  CV: Palpable pulse  No discernable arrhythmia  No LE edema  Lymph:  No palpable cervical, axillary, or inguinal lymphadenopathy  Skin: Warm  No palpable masses  No visible lesions  Neuro: Normal muscle tone  Normal and symmetric DTR's  Left Knee Exam  Alignment:  Normal knee alignment  Inspection:  No swelling  No ecchymosis  mild expected quad muscle atrophy  Incision healed  Palpation:  No tenderness  No effusion  ROM:  Knee Extension 0  Knee Flexion 135    Strength:  Able to SLR without lag  Stability:  (-) Lachman  (-) Pivot-shift  Tests:  (-) Andrzej  Patella:  Normal patellar mobility  Neurovascular:  Sensation intact in DP/SP/Conley/Sa/T nerve distributions  2+ DP & PT pulses  Gait:  Normal     Studies Reviewed  No studies to review    Procedures  No procedures today  Medical, Surgical, Family, and Social History  The patient's medical history, family history, and social history, were reviewed and updated as appropriate  Past Medical History:   Diagnosis Date    Concussion     unsure of LOC- skiing accident 2/21/21    Wears contact lenses        Past Surgical History:   Procedure Laterality Date    EGD      KNEE ARTHROSCOPY W/ ACL RECONSTRUCTION  03/2021    ORIF TIBIA & FIBULA FRACTURES Right     right ankle    ME KNEE SCOPE,AID ANT CRUCIATE REPAIR Left 3/8/2021    Procedure: ARTHROSCOPIC RECONSTRUCTION ANTERIOR CRUCIATE LIGAMENT (ACL) WITH AUTOGRAFT (patellar tendon); MENISCUS REPAIR;  Surgeon: Sincere Borjas MD;  [facility];  Service: Orthopedics    TIBIA FRACTURE SURGERY         History reviewed  No pertinent family history      Social History     Occupational History    Not on file   Tobacco Use    Smoking status: Never Smoker    Smokeless tobacco: Never Used   Vaping Use    Vaping Use: Never used   Substance and Sexual Activity    Alcohol use: Yes     Comment: socially    Drug use: Never    Sexual activity: Yes     Partners: Female       No Known Allergies      Current Outpatient Medications:     Ascorbic Acid (Vitamin C) 500 MG CAPS, Take 1 capsule by mouth daily, Disp: , Rfl:     atorvastatin (LIPITOR) 10 mg tablet, Take 1 tablet (10 mg total) by mouth daily, Disp: 30 tablet, Rfl: 5    famotidine (PEPCID) 40 MG tablet, TAKE 1 TABLET BY MOUTH EVERY DAY, Disp: 90 tablet, Rfl: 2    Garlic 10 MG CAPS, Take 50 mg by mouth daily, Disp: , Rfl:     hydrochlorothiazide (HYDRODIURIL) 12 5 mg tablet, Take 1 tablet (12 5 mg total) by mouth daily, Disp: 30 tablet, Rfl: 5    Multiple Vitamin (multivitamin) tablet, Take 1 tablet by mouth daily, Disp: , Rfl:     Omega-3 Fatty Acids (Fish Oil) 500 MG CAPS, Take 500 mg by mouth daily, Disp: , Rfl:       [employer]    I,:  Stella Gitelman am acting as a scribe while in the presence of the attending physician :       I,:  Bev Martinez MD personally performed the services described in this documentation    as scribed in my presence :

## 2021-09-28 LAB
CELLS ANALYZED: 20
CELLS COUNTED AMN: 20
CELLS KARYOTYPED.TOTAL BLD/T: 2
CLINICAL CYTOGENETICIST SPEC: NORMAL
ISCN BAND LEVEL QL: 500
KARYOTYP BLD/T: NORMAL
KARYOTYP BLD/T: NORMAL
SPECIMEN SOURCE: NORMAL

## 2021-10-29 ENCOUNTER — OFFICE VISIT (OUTPATIENT)
Dept: FAMILY MEDICINE CLINIC | Facility: CLINIC | Age: 41
End: 2021-10-29
Payer: COMMERCIAL

## 2021-10-29 VITALS
SYSTOLIC BLOOD PRESSURE: 128 MMHG | HEIGHT: 72 IN | WEIGHT: 238.4 LBS | DIASTOLIC BLOOD PRESSURE: 84 MMHG | TEMPERATURE: 97.1 F | BODY MASS INDEX: 32.29 KG/M2 | OXYGEN SATURATION: 98 % | HEART RATE: 90 BPM

## 2021-10-29 DIAGNOSIS — E66.9 OBESITY (BMI 30-39.9): ICD-10-CM

## 2021-10-29 DIAGNOSIS — E78.5 HYPERLIPIDEMIA, UNSPECIFIED HYPERLIPIDEMIA TYPE: ICD-10-CM

## 2021-10-29 DIAGNOSIS — R73.03 PREDIABETES: ICD-10-CM

## 2021-10-29 DIAGNOSIS — I10 PRIMARY HYPERTENSION: Primary | ICD-10-CM

## 2021-10-29 DIAGNOSIS — Z23 FLU VACCINE NEED: ICD-10-CM

## 2021-10-29 PROCEDURE — 3074F SYST BP LT 130 MM HG: CPT | Performed by: FAMILY MEDICINE

## 2021-10-29 PROCEDURE — 90686 IIV4 VACC NO PRSV 0.5 ML IM: CPT | Performed by: FAMILY MEDICINE

## 2021-10-29 PROCEDURE — 90471 IMMUNIZATION ADMIN: CPT | Performed by: FAMILY MEDICINE

## 2021-10-29 PROCEDURE — 99214 OFFICE O/P EST MOD 30 MIN: CPT | Performed by: FAMILY MEDICINE

## 2021-10-29 PROCEDURE — 3008F BODY MASS INDEX DOCD: CPT | Performed by: FAMILY MEDICINE

## 2021-10-29 PROCEDURE — 3079F DIAST BP 80-89 MM HG: CPT | Performed by: FAMILY MEDICINE

## 2021-10-29 PROCEDURE — 1036F TOBACCO NON-USER: CPT | Performed by: FAMILY MEDICINE

## 2021-11-19 ENCOUNTER — OFFICE VISIT (OUTPATIENT)
Dept: OBGYN CLINIC | Facility: MEDICAL CENTER | Age: 41
End: 2021-11-19
Payer: COMMERCIAL

## 2021-11-19 VITALS
HEART RATE: 92 BPM | DIASTOLIC BLOOD PRESSURE: 93 MMHG | WEIGHT: 244.4 LBS | BODY MASS INDEX: 33.1 KG/M2 | HEIGHT: 72 IN | SYSTOLIC BLOOD PRESSURE: 149 MMHG

## 2021-11-19 DIAGNOSIS — S83.512D RUPTURE OF ANTERIOR CRUCIATE LIGAMENT OF LEFT KNEE, SUBSEQUENT ENCOUNTER: Primary | ICD-10-CM

## 2021-11-19 DIAGNOSIS — S83.512A RUPTURE OF ANTERIOR CRUCIATE LIGAMENT OF LEFT KNEE, INITIAL ENCOUNTER: ICD-10-CM

## 2021-11-19 PROCEDURE — 3077F SYST BP >= 140 MM HG: CPT | Performed by: ORTHOPAEDIC SURGERY

## 2021-11-19 PROCEDURE — 3008F BODY MASS INDEX DOCD: CPT | Performed by: ORTHOPAEDIC SURGERY

## 2021-11-19 PROCEDURE — 99213 OFFICE O/P EST LOW 20 MIN: CPT | Performed by: ORTHOPAEDIC SURGERY

## 2021-11-19 PROCEDURE — 1036F TOBACCO NON-USER: CPT | Performed by: ORTHOPAEDIC SURGERY

## 2021-11-19 PROCEDURE — 3080F DIAST BP >= 90 MM HG: CPT | Performed by: ORTHOPAEDIC SURGERY

## 2021-12-04 DIAGNOSIS — K20.90 ESOPHAGITIS: ICD-10-CM

## 2021-12-04 RX ORDER — FAMOTIDINE 40 MG/1
TABLET, FILM COATED ORAL
Qty: 90 TABLET | Refills: 2 | Status: SHIPPED | OUTPATIENT
Start: 2021-12-04 | End: 2022-07-08

## 2021-12-23 DIAGNOSIS — E78.5 HYPERLIPIDEMIA, UNSPECIFIED HYPERLIPIDEMIA TYPE: ICD-10-CM

## 2021-12-23 RX ORDER — ATORVASTATIN CALCIUM 10 MG/1
TABLET, FILM COATED ORAL
Qty: 90 TABLET | Refills: 1 | Status: SHIPPED | OUTPATIENT
Start: 2021-12-23 | End: 2022-07-08 | Stop reason: SDUPTHER

## 2022-02-07 ENCOUNTER — APPOINTMENT (OUTPATIENT)
Dept: LAB | Facility: CLINIC | Age: 42
End: 2022-02-07
Payer: COMMERCIAL

## 2022-02-07 DIAGNOSIS — I10 ESSENTIAL HYPERTENSION: ICD-10-CM

## 2022-02-07 DIAGNOSIS — R73.03 PREDIABETES: ICD-10-CM

## 2022-02-07 DIAGNOSIS — I10 PRIMARY HYPERTENSION: ICD-10-CM

## 2022-02-07 DIAGNOSIS — E78.5 HYPERLIPIDEMIA, UNSPECIFIED HYPERLIPIDEMIA TYPE: ICD-10-CM

## 2022-02-07 DIAGNOSIS — E66.9 OBESITY (BMI 30-39.9): ICD-10-CM

## 2022-02-07 LAB
ALBUMIN SERPL BCP-MCNC: 4.3 G/DL (ref 3.5–5)
ALP SERPL-CCNC: 71 U/L (ref 46–116)
ALT SERPL W P-5'-P-CCNC: 36 U/L (ref 12–78)
ANION GAP SERPL CALCULATED.3IONS-SCNC: 8 MMOL/L (ref 4–13)
AST SERPL W P-5'-P-CCNC: 23 U/L (ref 5–45)
BILIRUB SERPL-MCNC: 0.87 MG/DL (ref 0.2–1)
BUN SERPL-MCNC: 20 MG/DL (ref 5–25)
CALCIUM SERPL-MCNC: 9.8 MG/DL (ref 8.3–10.1)
CHLORIDE SERPL-SCNC: 99 MMOL/L (ref 100–108)
CHOLEST SERPL-MCNC: 143 MG/DL
CO2 SERPL-SCNC: 28 MMOL/L (ref 21–32)
CREAT SERPL-MCNC: 0.88 MG/DL (ref 0.6–1.3)
EST. AVERAGE GLUCOSE BLD GHB EST-MCNC: 120 MG/DL
GFR SERPL CREATININE-BSD FRML MDRD: 107 ML/MIN/1.73SQ M
GLUCOSE P FAST SERPL-MCNC: 106 MG/DL (ref 65–99)
HBA1C MFR BLD: 5.8 %
HDLC SERPL-MCNC: 48 MG/DL
LDLC SERPL CALC-MCNC: 62 MG/DL (ref 0–100)
POTASSIUM SERPL-SCNC: 3.7 MMOL/L (ref 3.5–5.3)
PROT SERPL-MCNC: 8.4 G/DL (ref 6.4–8.2)
SODIUM SERPL-SCNC: 135 MMOL/L (ref 136–145)
TRIGL SERPL-MCNC: 167 MG/DL

## 2022-02-07 PROCEDURE — 83036 HEMOGLOBIN GLYCOSYLATED A1C: CPT | Performed by: FAMILY MEDICINE

## 2022-02-07 PROCEDURE — 36415 COLL VENOUS BLD VENIPUNCTURE: CPT | Performed by: FAMILY MEDICINE

## 2022-02-07 PROCEDURE — 80053 COMPREHEN METABOLIC PANEL: CPT

## 2022-02-07 PROCEDURE — 80061 LIPID PANEL: CPT

## 2022-02-11 ENCOUNTER — OFFICE VISIT (OUTPATIENT)
Dept: FAMILY MEDICINE CLINIC | Facility: CLINIC | Age: 42
End: 2022-02-11
Payer: COMMERCIAL

## 2022-02-11 VITALS
RESPIRATION RATE: 16 BRPM | HEIGHT: 73 IN | HEART RATE: 81 BPM | WEIGHT: 250 LBS | OXYGEN SATURATION: 96 % | SYSTOLIC BLOOD PRESSURE: 142 MMHG | DIASTOLIC BLOOD PRESSURE: 100 MMHG | BODY MASS INDEX: 33.13 KG/M2 | TEMPERATURE: 97.6 F

## 2022-02-11 DIAGNOSIS — E66.9 OBESITY (BMI 30-39.9): ICD-10-CM

## 2022-02-11 DIAGNOSIS — R73.03 PREDIABETES: ICD-10-CM

## 2022-02-11 DIAGNOSIS — I10 ESSENTIAL HYPERTENSION: Primary | ICD-10-CM

## 2022-02-11 DIAGNOSIS — E78.5 HYPERLIPIDEMIA, UNSPECIFIED HYPERLIPIDEMIA TYPE: ICD-10-CM

## 2022-02-11 PROCEDURE — 3080F DIAST BP >= 90 MM HG: CPT | Performed by: FAMILY MEDICINE

## 2022-02-11 PROCEDURE — 99214 OFFICE O/P EST MOD 30 MIN: CPT | Performed by: FAMILY MEDICINE

## 2022-02-11 PROCEDURE — 3077F SYST BP >= 140 MM HG: CPT | Performed by: FAMILY MEDICINE

## 2022-02-11 PROCEDURE — 3008F BODY MASS INDEX DOCD: CPT | Performed by: FAMILY MEDICINE

## 2022-02-11 PROCEDURE — 1036F TOBACCO NON-USER: CPT | Performed by: FAMILY MEDICINE

## 2022-02-11 RX ORDER — HYDROCHLOROTHIAZIDE 25 MG/1
25 TABLET ORAL DAILY
Qty: 90 TABLET | Refills: 3 | Status: SHIPPED | OUTPATIENT
Start: 2022-02-11 | End: 2022-02-14 | Stop reason: SDUPTHER

## 2022-02-11 NOTE — PROGRESS NOTES
Assessment/Plan:  Chief Complaint   Patient presents with    Physical Exam     Patient Instructions   Here for recheck and needs to lose weight to help lower HGA1C and continue with cholesterol med as this is much improved  Diet and exercise and rec increasing BP med to HCTZ 25 mg daily, recheck BP in 3 1/2 months  COVID 19 vaccinated and boosted  Use sunscreen and monitor for ticks  No problem-specific Assessment & Plan notes found for this encounter  Diagnoses and all orders for this visit:    Essential hypertension  -     hydrochlorothiazide (HYDRODIURIL) 25 mg tablet; Take 1 tablet (25 mg total) by mouth daily  -     Comprehensive metabolic panel; Future    Hyperlipidemia, unspecified hyperlipidemia type  -     Comprehensive metabolic panel; Future    Prediabetes  -     Comprehensive metabolic panel; Future  -     Hemoglobin A1C    Obesity (BMI 30-39  9)          Subjective:      Patient ID: Ilene Salmeron is a 40 y o  male  Physical Exam      The following portions of the patient's history were reviewed and updated as appropriate: allergies, current medications, past family history, past medical history, past social history, past surgical history and problem list     Review of Systems      Objective:      /100   Pulse 81   Temp 97 6 °F (36 4 °C) (Temporal)   Resp 16   Ht 6' 1" (1 854 m)   Wt 113 kg (250 lb)   SpO2 96%   BMI 32 98 kg/m²          Physical Exam  Constitutional:       Appearance: He is well-developed  He is obese  HENT:      Head: Normocephalic and atraumatic  Eyes:      Conjunctiva/sclera: Conjunctivae normal       Pupils: Pupils are equal, round, and reactive to light  Cardiovascular:      Rate and Rhythm: Normal rate and regular rhythm  Heart sounds: Normal heart sounds  Pulmonary:      Effort: Pulmonary effort is normal       Breath sounds: Normal breath sounds  Musculoskeletal:         General: Normal range of motion        Cervical back: Normal range of motion and neck supple  Skin:     General: Skin is warm and dry  Neurological:      Mental Status: He is alert and oriented to person, place, and time  Deep Tendon Reflexes: Reflexes are normal and symmetric     Psychiatric:         Behavior: Behavior normal

## 2022-02-11 NOTE — PATIENT INSTRUCTIONS
Here for recheck and needs to lose weight to help lower HGA1C and continue with cholesterol med as this is much improved  Diet and exercise and rec increasing BP med to HCTZ 25 mg daily, recheck BP in 3 1/2 months  COVID 19 vaccinated and boosted  Use sunscreen and monitor for ticks

## 2022-02-14 DIAGNOSIS — I10 ESSENTIAL HYPERTENSION: ICD-10-CM

## 2022-02-14 RX ORDER — HYDROCHLOROTHIAZIDE 25 MG/1
25 TABLET ORAL DAILY
Qty: 90 TABLET | Refills: 3 | Status: SHIPPED | OUTPATIENT
Start: 2022-02-14 | End: 2022-07-08 | Stop reason: SDUPTHER

## 2022-02-14 NOTE — TELEPHONE ENCOUNTER
We received e prescribing order on the pt's hydrochlorothiazide, I called the Pt to verify medication was not picked up and he stated it has not, pharmacy did not make him aware it is ready  Will re send

## 2022-02-14 NOTE — TELEPHONE ENCOUNTER
Requested medication(s) are due for refill today: Yes  Patient has already received a courtesy refill: No  Other reason request has been forwarded to provider: pharmacy did not receive request, we received e prescribing error

## 2022-05-27 ENCOUNTER — AMB VIDEO VISIT (OUTPATIENT)
Dept: OTHER | Facility: HOSPITAL | Age: 42
End: 2022-05-27
Payer: COMMERCIAL

## 2022-05-27 DIAGNOSIS — J01.00 ACUTE NON-RECURRENT MAXILLARY SINUSITIS: Primary | ICD-10-CM

## 2022-05-27 PROCEDURE — 99212 OFFICE O/P EST SF 10 MIN: CPT | Performed by: PHYSICIAN ASSISTANT

## 2022-05-27 RX ORDER — AMOXICILLIN AND CLAVULANATE POTASSIUM 875; 125 MG/1; MG/1
1 TABLET, FILM COATED ORAL EVERY 12 HOURS SCHEDULED
Qty: 20 TABLET | Refills: 0 | Status: SHIPPED | OUTPATIENT
Start: 2022-05-27 | End: 2022-06-06

## 2022-05-27 RX ORDER — FLUTICASONE PROPIONATE 50 MCG
1 SPRAY, SUSPENSION (ML) NASAL DAILY
Qty: 9.9 ML | Refills: 0 | Status: SHIPPED | OUTPATIENT
Start: 2022-05-27

## 2022-05-27 NOTE — PROGRESS NOTES
Video Visit - Luz Elena Matthew 40 y o  male MRN: [id number]    REQUIRED DOCUMENTATION:         1  This service was provided via [employer]  2  Provider located at [address] [place] [id number]  3  [facility] provider: Steph Luis PA-C   4  Identify all parties in room with patient during [facility] visit:  No one else  5  After connecting through [employer], patient was identified by name and date of birth  Patient was then informed that this was a Telemedicine visit and that the exam was being conducted confidentially over secure lines  My office door was closed  No one else was in the room  Patient acknowledged consent and understanding of privacy and security of the Telemedicine visit  I informed the patient that I have reviewed their record in Epic and presented the opportunity for them to ask any questions regarding the visit today  The patient agreed to participate  HPI  Patient states his PCP is not available until next week  He is having sinus congestion, nasal discharge that is green  He has been taking home covid tests for the past 4 days which are negative  He is complaining of pressure in his sinuses and water in his ears  He also has cracking in his ears  He has been sick for about 5 days  He is vaccinated for covid  He denies any fevers , chills, nausea, vomiting, diarrhea, cough ,SOB, CP  Physical Exam  Constitutional:       General: He is not in acute distress  Appearance: Normal appearance  He is not ill-appearing, toxic-appearing or diaphoretic  HENT:      Head: Normocephalic and atraumatic  Nose: Congestion present  Comments: TTP over sinuses  Pulmonary:      Effort: Pulmonary effort is normal  No respiratory distress  Comments: Talking in complete sentences, no audible wheezing  Lymphadenopathy:      Cervical: No cervical adenopathy  Skin:     General: Skin is dry  Neurological:      General: No focal deficit present  Mental Status: He is alert and oriented to person, place, and time  Psychiatric:         Mood and Affect: Mood normal          Behavior: Behavior normal          Diagnoses and all orders for this visit:    Acute non-recurrent maxillary sinusitis  -     amoxicillin-clavulanate (Augmentin) 875-125 mg per tablet; Take 1 tablet by mouth every 12 (twelve) hours for 10 days  -     fluticasone (FLONASE) 50 mcg/act nasal spray; 1 spray into each nostril daily    will treat for sinus infection  Follow up with PCP  ER if worsen  Patient Instructions     Sinusitis   WHAT YOU NEED TO KNOW:   Sinusitis is inflammation or infection of your sinuses  Sinusitis is most often caused by a virus  Acute sinusitis may last up to 12 weeks  Chronic sinusitis lasts longer than 12 weeks  Recurrent sinusitis means you have 4 or more infections in 1 year  DISCHARGE INSTRUCTIONS:   Return to the emergency department if:   · You have trouble breathing or wheezing that is getting worse  · You have a stiff neck, a fever, or a bad headache  · You cannot open your eye  · Your eyeball bulges out or you cannot move your eye  · You are more sleepy than normal, or you notice changes in your ability to think, move, or talk  · You have swelling of your forehead or scalp  Call your doctor if:   · You have vision changes, such as double vision  · Your eye and eyelid are red, swollen, and painful  · Your symptoms do not improve or go away after 10 days  · You have nausea and are vomiting  · Your nose is bleeding  · You have questions or concerns about your condition or care  Medicines: Your symptoms may go away on their own  Your healthcare provider may recommend watchful waiting for up to 10 days before starting antibiotics  You may need any of the following:  · Acetaminophen  decreases pain and fever  It is available without a doctor's order  Ask how much to take and how often to take it  Follow directions  Read the labels of all other medicines you are using to see if they also contain acetaminophen, or ask your doctor or pharmacist  Acetaminophen can cause liver damage if not taken correctly  Do not use more than 4 grams (4,000 milligrams) total of acetaminophen in one day  · NSAIDs , such as ibuprofen, help decrease swelling, pain, and fever  This medicine is available with or without a doctor's order  NSAIDs can cause stomach bleeding or kidney problems in certain people  If you take blood thinner medicine, always ask your healthcare provider if NSAIDs are safe for you  Always read the medicine label and follow directions  · Nasal steroid sprays  may help decrease inflammation in your nose and sinuses  · Decongestants  help reduce swelling and drain mucus in the nose and sinuses  They may help you breathe easier  · Antihistamines  help dry mucus in the nose and relieve sneezing  · Antibiotics  help treat or prevent a bacterial infection  · Take your medicine as directed  Contact your healthcare provider if you think your medicine is not helping or if you have side effects  Tell him or her if you are allergic to any medicine  Keep a list of the medicines, vitamins, and herbs you take  Include the amounts, and when and why you take them  Bring the list or the pill bottles to follow-up visits  Carry your medicine list with you in case of an emergency  Self-care:   · Rinse your sinuses as directed  Use a sinus rinse device to rinse your nasal passages with a saline (salt water) solution or distilled water  Do not use tap water  This will help thin the mucus in your nose and rinse away pollen and dirt  It will also help reduce swelling so you can breathe normally  · Use a humidifier  to increase air moisture in your home  This may make it easier for you to breathe and help decrease your cough  · Sleep with your head elevated    Place an extra pillow under your head before you go to sleep to help your sinuses drain  · Drink liquids as directed  Ask your healthcare provider how much liquid to drink each day and which liquids are best for you  Liquids will thin the mucus in your nose and help it drain  Avoid drinks that contain alcohol or caffeine  · Do not smoke, and avoid secondhand smoke  Nicotine and other chemicals in cigarettes and cigars can make your symptoms worse  Ask your healthcare provider for information if you currently smoke and need help to quit  E-cigarettes or smokeless tobacco still contain nicotine  Talk to your healthcare provider before you use these products  Prevent the spread of germs:   · Wash your hands often with soap and water  Wash your hands after you use the bathroom, change a child's diaper, or sneeze  Wash your hands before you prepare or eat food  · Stay away from people who are sick  Some germs spread easily and quickly through contact  Follow up with your doctor as directed: You may be referred to an ear, nose, and throat specialist  Write down your questions so you remember to ask them during your visits  © Copyright [employer] 2022 Information is for End User's use only and may not be sold, redistributed or otherwise used for commercial purposes  All illustrations and images included in CareNotes® are the copyrighted property of A D A M [employer] Inc  or Steve Walter   The above information is an  only  It is not intended as medical advice for individual conditions or treatments  Talk to your doctor, nurse or pharmacist before following any medical regimen to see if it is safe and effective for you

## 2022-05-27 NOTE — CARE ANYWHERE EVISITS
Visit Summary for Waldo Mcnulty - Gender: Male - Date of Birth: [id number]  Date: [id number] - Duration: 4 minutes  Patient: Waldo Mcnulty  Provider: Eugene Pedraza PA-C    Patient Contact Information  Address  [address] [place] [id number]  [phone]    Visit Topics  Cold [Added By: Self - 2022-05-27]    Triage Questions   What is your current physical address in the event of a medical emergency? Answer []  Are you allergic to any medications? Answer []  Are you now or could you be pregnant? Answer []  Do you have any immune system compromise or chronic lung   disease? Answer []  Do you have any vulnerable family members in the home (infant, pregnant, cancer, elderly)? Answer []     Conversation Transcripts  [0A][0A] [Notification] You are connected with Eugene Pedraza PA-C, Urgent Care Specialist [0A][Notification] [facility] is located in [address] [place] [address]  [0A][Notification] [facility] has shared health history  Flavio Toscano  [0A]    Diagnosis  Acute maxillary sinusitis, unspecified    Procedures  Value: [id number] Code: CPT-4 UNLISTED E&M SERVICE    Medications Prescribed    No prescriptions ordered    Electronically signed by: Adriana Washington(NPI [id number])

## 2022-05-27 NOTE — PATIENT INSTRUCTIONS
Sinusitis   WHAT YOU NEED TO KNOW:   Sinusitis is inflammation or infection of your sinuses  Sinusitis is most often caused by a virus  Acute sinusitis may last up to 12 weeks  Chronic sinusitis lasts longer than 12 weeks  Recurrent sinusitis means you have 4 or more infections in 1 year  DISCHARGE INSTRUCTIONS:   Return to the emergency department if:   You have trouble breathing or wheezing that is getting worse  You have a stiff neck, a fever, or a bad headache  You cannot open your eye  Your eyeball bulges out or you cannot move your eye  You are more sleepy than normal, or you notice changes in your ability to think, move, or talk  You have swelling of your forehead or scalp  Call your doctor if:   You have vision changes, such as double vision  Your eye and eyelid are red, swollen, and painful  Your symptoms do not improve or go away after 10 days  You have nausea and are vomiting  Your nose is bleeding  You have questions or concerns about your condition or care  Medicines: Your symptoms may go away on their own  Your healthcare provider may recommend watchful waiting for up to 10 days before starting antibiotics  You may need any of the following:  Acetaminophen  decreases pain and fever  It is available without a doctor's order  Ask how much to take and how often to take it  Follow directions  Read the labels of all other medicines you are using to see if they also contain acetaminophen, or ask your doctor or pharmacist  Acetaminophen can cause liver damage if not taken correctly  Do not use more than 4 grams (4,000 milligrams) total of acetaminophen in one day  NSAIDs , such as ibuprofen, help decrease swelling, pain, and fever  This medicine is available with or without a doctor's order  NSAIDs can cause stomach bleeding or kidney problems in certain people   If you take blood thinner medicine, always ask your healthcare provider if NSAIDs are safe for you  Always read the medicine label and follow directions  Nasal steroid sprays  may help decrease inflammation in your nose and sinuses  Decongestants  help reduce swelling and drain mucus in the nose and sinuses  They may help you breathe easier  Antihistamines  help dry mucus in the nose and relieve sneezing  Antibiotics  help treat or prevent a bacterial infection  Take your medicine as directed  Contact your healthcare provider if you think your medicine is not helping or if you have side effects  Tell him or her if you are allergic to any medicine  Keep a list of the medicines, vitamins, and herbs you take  Include the amounts, and when and why you take them  Bring the list or the pill bottles to follow-up visits  Carry your medicine list with you in case of an emergency  Self-care:   Rinse your sinuses as directed  Use a sinus rinse device to rinse your nasal passages with a saline (salt water) solution or distilled water  Do not use tap water  This will help thin the mucus in your nose and rinse away pollen and dirt  It will also help reduce swelling so you can breathe normally  Use a humidifier  to increase air moisture in your home  This may make it easier for you to breathe and help decrease your cough  Sleep with your head elevated  Place an extra pillow under your head before you go to sleep to help your sinuses drain  Drink liquids as directed  Ask your healthcare provider how much liquid to drink each day and which liquids are best for you  Liquids will thin the mucus in your nose and help it drain  Avoid drinks that contain alcohol or caffeine  Do not smoke, and avoid secondhand smoke  Nicotine and other chemicals in cigarettes and cigars can make your symptoms worse  Ask your healthcare provider for information if you currently smoke and need help to quit  E-cigarettes or smokeless tobacco still contain nicotine   Talk to your healthcare provider before you use these products  Prevent the spread of germs:   Wash your hands often with soap and water  Wash your hands after you use the bathroom, change a child's diaper, or sneeze  Wash your hands before you prepare or eat food  Stay away from people who are sick  Some germs spread easily and quickly through contact  Follow up with your doctor as directed: You may be referred to an ear, nose, and throat specialist  Write down your questions so you remember to ask them during your visits  © Copyright [employer] 2022 Information is for End User's use only and may not be sold, redistributed or otherwise used for commercial purposes  All illustrations and images included in CareNotes® are the copyrighted property of A D A M , Inc  or [facility] Rosamaria Walter   The above information is an  only  It is not intended as medical advice for individual conditions or treatments  Talk to your doctor, nurse or pharmacist before following any medical regimen to see if it is safe and effective for you

## 2022-07-06 ENCOUNTER — APPOINTMENT (OUTPATIENT)
Dept: LAB | Facility: CLINIC | Age: 42
End: 2022-07-06
Payer: COMMERCIAL

## 2022-07-06 DIAGNOSIS — I10 ESSENTIAL HYPERTENSION: ICD-10-CM

## 2022-07-06 DIAGNOSIS — R73.03 PREDIABETES: ICD-10-CM

## 2022-07-06 DIAGNOSIS — E78.5 HYPERLIPIDEMIA, UNSPECIFIED HYPERLIPIDEMIA TYPE: ICD-10-CM

## 2022-07-06 LAB
ALBUMIN SERPL BCP-MCNC: 4.5 G/DL (ref 3.5–5)
ALP SERPL-CCNC: 59 U/L (ref 34–104)
ALT SERPL W P-5'-P-CCNC: 38 U/L (ref 7–52)
ANION GAP SERPL CALCULATED.3IONS-SCNC: 6 MMOL/L (ref 4–13)
AST SERPL W P-5'-P-CCNC: 29 U/L (ref 13–39)
BILIRUB SERPL-MCNC: 0.63 MG/DL (ref 0.2–1)
BUN SERPL-MCNC: 15 MG/DL (ref 5–25)
CALCIUM SERPL-MCNC: 9.5 MG/DL (ref 8.4–10.2)
CHLORIDE SERPL-SCNC: 99 MMOL/L (ref 96–108)
CO2 SERPL-SCNC: 33 MMOL/L (ref 21–32)
CREAT SERPL-MCNC: 0.89 MG/DL (ref 0.6–1.3)
GFR SERPL CREATININE-BSD FRML MDRD: 106 ML/MIN/1.73SQ M
GLUCOSE P FAST SERPL-MCNC: 98 MG/DL (ref 65–99)
POTASSIUM SERPL-SCNC: 3.5 MMOL/L (ref 3.5–5.3)
PROT SERPL-MCNC: 7.8 G/DL (ref 6.4–8.4)
SODIUM SERPL-SCNC: 138 MMOL/L (ref 135–147)

## 2022-07-06 PROCEDURE — 80053 COMPREHEN METABOLIC PANEL: CPT

## 2022-07-06 PROCEDURE — 36415 COLL VENOUS BLD VENIPUNCTURE: CPT | Performed by: FAMILY MEDICINE

## 2022-07-06 PROCEDURE — 83036 HEMOGLOBIN GLYCOSYLATED A1C: CPT | Performed by: FAMILY MEDICINE

## 2022-07-07 LAB
EST. AVERAGE GLUCOSE BLD GHB EST-MCNC: 120 MG/DL
HBA1C MFR BLD: 5.8 %

## 2022-07-08 ENCOUNTER — OFFICE VISIT (OUTPATIENT)
Dept: FAMILY MEDICINE CLINIC | Facility: CLINIC | Age: 42
End: 2022-07-08
Payer: COMMERCIAL

## 2022-07-08 VITALS
HEIGHT: 72 IN | BODY MASS INDEX: 33.59 KG/M2 | DIASTOLIC BLOOD PRESSURE: 96 MMHG | TEMPERATURE: 96.9 F | SYSTOLIC BLOOD PRESSURE: 132 MMHG | HEART RATE: 90 BPM | OXYGEN SATURATION: 97 % | WEIGHT: 248 LBS

## 2022-07-08 DIAGNOSIS — I10 ESSENTIAL HYPERTENSION: ICD-10-CM

## 2022-07-08 DIAGNOSIS — E66.9 OBESITY (BMI 30-39.9): ICD-10-CM

## 2022-07-08 DIAGNOSIS — Z82.49 FAMILY HISTORY OF EARLY CAD: ICD-10-CM

## 2022-07-08 DIAGNOSIS — Z00.00 HEALTH CARE MAINTENANCE: Primary | ICD-10-CM

## 2022-07-08 DIAGNOSIS — E78.5 HYPERLIPIDEMIA, UNSPECIFIED HYPERLIPIDEMIA TYPE: ICD-10-CM

## 2022-07-08 DIAGNOSIS — R73.03 PREDIABETES: ICD-10-CM

## 2022-07-08 PROCEDURE — 3075F SYST BP GE 130 - 139MM HG: CPT | Performed by: FAMILY MEDICINE

## 2022-07-08 PROCEDURE — 3080F DIAST BP >= 90 MM HG: CPT | Performed by: FAMILY MEDICINE

## 2022-07-08 PROCEDURE — 99396 PREV VISIT EST AGE 40-64: CPT | Performed by: FAMILY MEDICINE

## 2022-07-08 PROCEDURE — 3725F SCREEN DEPRESSION PERFORMED: CPT | Performed by: FAMILY MEDICINE

## 2022-07-08 RX ORDER — ATORVASTATIN CALCIUM 10 MG/1
10 TABLET, FILM COATED ORAL DAILY
Qty: 90 TABLET | Refills: 1 | Status: SHIPPED | OUTPATIENT
Start: 2022-07-08

## 2022-07-08 RX ORDER — HYDROCHLOROTHIAZIDE 25 MG/1
25 TABLET ORAL DAILY
Qty: 90 TABLET | Refills: 3 | Status: SHIPPED | OUTPATIENT
Start: 2022-07-08

## 2022-07-08 NOTE — PROGRESS NOTES
Assessment/Plan:  Chief Complaint   Patient presents with    Physical Exam     Patient Instructions   Here for general PE and needs to lose weight and monitor for ticks and also rec taking BP med and also cholesterol med and also rec to recheck bp and labs in 6 months in office  Use sunscreen and monitor for ticks  No problem-specific Assessment & Plan notes found for this encounter  Diagnoses and all orders for this visit:    Health care maintenance    Essential hypertension  -     hydrochlorothiazide (HYDRODIURIL) 25 mg tablet; Take 1 tablet (25 mg total) by mouth daily  -     Ambulatory Referral to Cardiology; Future    Prediabetes  -     Comprehensive metabolic panel; Future  -     Hemoglobin A1C; Future  -     Ambulatory Referral to Cardiology; Future    Hyperlipidemia, unspecified hyperlipidemia type  -     atorvastatin (LIPITOR) 10 mg tablet; Take 1 tablet (10 mg total) by mouth daily  -     Ambulatory Referral to Cardiology; Future    Obesity (BMI 30-39 9)  -     Ambulatory Referral to Cardiology; Future    Family history of early CAD  -     Ambulatory Referral to Cardiology; Future          Subjective:      Patient ID: Lizzy Winters is a 40 y o  male  Physical Exam, here for recheck and here to review labs  Brother with quadruple bypass age 50  Patient is  and is an   Patient has a 3year old girl and another child coming august 2022  The following portions of the patient's history were reviewed and updated as appropriate: allergies, current medications, past family history, past medical history, past social history, past surgical history and problem list     Review of Systems   Constitutional: Negative  HENT: Negative  Eyes: Negative  Respiratory: Negative  Cardiovascular: Negative  Gastrointestinal: Negative  Endocrine: Negative  Genitourinary: Negative  Musculoskeletal: Negative  Skin: Negative  Allergic/Immunologic: Negative  Neurological: Negative  Hematological: Negative  Psychiatric/Behavioral: Negative  Objective:      /96 (BP Location: Left arm, Patient Position: Sitting, Cuff Size: Large)   Pulse 90   Temp (!) 96 9 °F (36 1 °C) (Temporal)   Ht 6' (1 829 m)   Wt 112 kg (248 lb)   SpO2 97%   BMI 33 63 kg/m²          Physical Exam  Constitutional:       Appearance: He is well-developed  HENT:      Head: Normocephalic and atraumatic  Right Ear: External ear normal       Left Ear: External ear normal       Nose: Nose normal    Eyes:      Conjunctiva/sclera: Conjunctivae normal       Pupils: Pupils are equal, round, and reactive to light  Cardiovascular:      Rate and Rhythm: Normal rate and regular rhythm  Heart sounds: Normal heart sounds  Pulmonary:      Effort: Pulmonary effort is normal       Breath sounds: Normal breath sounds  Abdominal:      General: Abdomen is flat  Bowel sounds are normal       Palpations: Abdomen is soft  Genitourinary:     Penis: Normal        Testes: Normal    Musculoskeletal:         General: Normal range of motion  Cervical back: Normal range of motion and neck supple  Skin:     General: Skin is warm and dry  Neurological:      Mental Status: He is alert and oriented to person, place, and time  Deep Tendon Reflexes: Reflexes are normal and symmetric     Psychiatric:         Behavior: Behavior normal

## 2022-07-08 NOTE — PATIENT INSTRUCTIONS
Here for general PE and needs to lose weight and monitor for ticks and also rec taking BP med and also cholesterol med and also rec to recheck bp and labs in 6 months in office  Use sunscreen and monitor for ticks

## 2022-08-09 ENCOUNTER — TELEMEDICINE (OUTPATIENT)
Dept: FAMILY MEDICINE CLINIC | Facility: CLINIC | Age: 42
End: 2022-08-09
Payer: COMMERCIAL

## 2022-08-09 ENCOUNTER — TELEPHONE (OUTPATIENT)
Dept: FAMILY MEDICINE CLINIC | Facility: CLINIC | Age: 42
End: 2022-08-09

## 2022-08-09 DIAGNOSIS — U07.1 COVID-19: Primary | ICD-10-CM

## 2022-08-09 PROCEDURE — 99442 PR PHYS/QHP TELEPHONE EVALUATION 11-20 MIN: CPT | Performed by: FAMILY MEDICINE

## 2022-08-09 NOTE — TELEPHONE ENCOUNTER
I called Elier Castillo requested that he please call the office on Friday 08/12/22 with an update no how he is doing  At that time we can schedule a follow up if needed  I informed pt I have no availability that I can offer for a virtual re check at this time  Pt was advised to please call sooner if symptoms worsen or change  Pt expressed verbal understanding call complete

## 2022-08-09 NOTE — PATIENT INSTRUCTIONS
Here for covid 19 f-up and positive for COVID 19 yesterday with symptoms starting Sunday night 8/7/22  Rest and fluids, Take Vitamin D and c and zinc and discussed Paxlovid if worse  Recheck in 3 days and avoid pregnant wife until cleared and use precautions at home with disinfecting common use areas and using separate bathroom  Rec also to call if worse and consider Paxlovid  May use Tylenol prn fever or pain

## 2022-08-09 NOTE — PROGRESS NOTES
COVID-19 Outpatient Progress Note    Assessment/Plan:    Problem List Items Addressed This Visit    None     Visit Diagnoses     COVID-19    -  Primary         Disposition:     Patient has COVID-19 infection  Based off CDC guidelines, they were recommended to isolate for 5 days from the date of the positive test  If they remain asymptomatic, isolation may be ended followed by 5 days of wearing a mask when around othes to minimize risk of infecting others  If they have a fever, continue to stay home until fever resolves for at least 24 hours  I have spent 15 minutes directly with the patient  Greater than 50% of this time was spent in counseling/coordination of care regarding: instructions for management and impressions  Encounter provider Cornell Swain DO    Provider located at [address] [place] [address] [address] [address] [phone]    Recent Visits  No visits were found meeting these conditions  Showing recent visits within past 7 days and meeting all other requirements  Today's Visits  Date Type Provider Dept   08/09/22 Telemedicine Cornell Swain, [facility]   Showing today's visits and meeting all other requirements  Future Appointments  No visits were found meeting these conditions  Showing future appointments within next 150 days and meeting all other requirements       The patient was identified by name and date of birth  Yadira John was informed that this is a telemedicine visit and that the visit is being conducted through Telephone  My office door was closed  No one else was in the room  He acknowledged consent and understanding of privacy and security of the video platform  The patient has agreed to participate and understands they can discontinue the visit at any time  Patient is aware this is a billable service      This virtual check-in was done via telephone and he agrees to proceed  Patient agrees to participate in a virtual check in via telephone or video visit instead of presenting to the office to address urgent/immediate medical needs  Patient is aware this is a billable service  After connecting through Telephone, the patient was identified by name and date of birth  Racheal Bhakta was informed that this was a telemedicine visit and that the exam was being conducted confidentially over secure lines  My office door was closed  No one else was in the room  Racheal Bhakta acknowledged consent and understanding of privacy and security of the telemedicine visit  I informed the patient that I have reviewed his record in Epic and presented the opportunity for him to ask any questions regarding the visit today  The patient agreed to participate  It was my intent to perform this visit via video technology but the patient was not able to do a video connection so the visit was completed via audio telephone only  Verification of patient location:  Patient is located in the following state in which I hold an active license: [place]    Subjective:   Racheal Bhakta is a 40 y o  male who has been screened for COVID-19  Symptom change since last report: resolving  Patient's symptoms include cough (mild)  Patient denies fever and shortness of breath  - Date of symptom onset: 8/7/2022  - Date of positive COVID-19 test: 8/8/2022  Type of test: Home antigen  COVID-19 vaccination status: Fully vaccinated with booster    Rizwan John has been staying home and has isolated themselves in his home  He is taking care to not share personal items and is cleaning all surfaces that are touched often, like counters, tabletops, and doorknobs using household cleaning sprays or wipes  He is wearing a mask when he leaves his room       Lab Results   Component Value Date    SARSCOV2 Not Detected 06/23/2020     Past Medical History:   Diagnosis Date    Concussion     unsure of LOC- skiing accident 2/21/21   Maribel Marcelo Wears contact lenses      Past Surgical History:   Procedure Laterality Date    EGD      KNEE ARTHROSCOPY W/ ACL RECONSTRUCTION  03/2021    ORIF TIBIA & FIBULA FRACTURES Right     right ankle    [place] KNEE SCOPE,AID ANT CRUCIATE REPAIR Left 3/8/2021    Procedure: ARTHROSCOPIC RECONSTRUCTION ANTERIOR CRUCIATE LIGAMENT (ACL) WITH AUTOGRAFT (patellar tendon); MENISCUS REPAIR;  Surgeon: Navin Montes MD;  Location: [facility];  Service: Orthopedics    TIBIA FRACTURE SURGERY       Current Outpatient Medications   Medication Sig Dispense Refill    Ascorbic Acid (Vitamin C) 500 MG CAPS Take 1 capsule by mouth daily      atorvastatin (LIPITOR) 10 mg tablet Take 1 tablet (10 mg total) by mouth daily 90 tablet 1    fluticasone (FLONASE) 50 mcg/act nasal spray 1 spray into each nostril daily 9 9 mL 0    Garlic 10 MG CAPS Take 50 mg by mouth daily (Patient not taking: No sig reported)      hydrochlorothiazide (HYDRODIURIL) 25 mg tablet Take 1 tablet (25 mg total) by mouth daily 90 tablet 3    Multiple Vitamin (multivitamin) tablet Take 1 tablet by mouth daily      Omega-3 Fatty Acids (Fish Oil) 500 MG CAPS Take 500 mg by mouth daily       No current facility-administered medications for this visit  No Known Allergies    Review of Systems   Constitutional: Negative  Negative for fever  HENT: Negative  Eyes: Negative  Respiratory: Positive for cough (mild)  Negative for shortness of breath  Cardiovascular: Negative  Gastrointestinal: Negative  Endocrine: Negative  Genitourinary: Negative  Musculoskeletal: Negative  Skin: Negative  Allergic/Immunologic: Negative  Neurological: Negative  Hematological: Negative  Psychiatric/Behavioral: Negative  Objective: There were no vitals filed for this visit  Physical Exam  Pulmonary:      Effort: Pulmonary effort is normal  No respiratory distress  Neurological:      General: No focal deficit present        Mental Status: He is alert and oriented to person, place, and time  Psychiatric:         Mood and Affect: Mood normal          Behavior: Behavior normal          Thought Content:  Thought content normal          Judgment: Judgment normal

## 2022-08-12 ENCOUNTER — TELEPHONE (OUTPATIENT)
Dept: FAMILY MEDICINE CLINIC | Facility: CLINIC | Age: 42
End: 2022-08-12

## 2022-08-12 NOTE — TELEPHONE ENCOUNTER
FYI,    Patient called with an update on his covid symptoms  He stated he is improving and the only symptom he has now is very slight congestion

## 2022-08-23 ENCOUNTER — TELEPHONE (OUTPATIENT)
Dept: FAMILY MEDICINE CLINIC | Facility: CLINIC | Age: 42
End: 2022-08-23

## 2022-08-23 DIAGNOSIS — K21.9 GASTROESOPHAGEAL REFLUX DISEASE, UNSPECIFIED WHETHER ESOPHAGITIS PRESENT: ICD-10-CM

## 2022-08-23 DIAGNOSIS — K21.9 GASTROESOPHAGEAL REFLUX DISEASE, UNSPECIFIED WHETHER ESOPHAGITIS PRESENT: Primary | ICD-10-CM

## 2022-08-23 RX ORDER — FAMOTIDINE 40 MG/1
40 TABLET, FILM COATED ORAL DAILY PRN
Qty: 30 TABLET | Refills: 5 | Status: SHIPPED | OUTPATIENT
Start: 2022-08-23 | End: 2022-08-23 | Stop reason: SDUPTHER

## 2022-08-23 RX ORDER — FAMOTIDINE 40 MG/1
40 TABLET, FILM COATED ORAL DAILY PRN
Qty: 30 TABLET | Refills: 5 | Status: SHIPPED | OUTPATIENT
Start: 2022-08-23 | End: 2022-09-15

## 2022-08-23 NOTE — TELEPHONE ENCOUNTER
Patients called he needs a refill of his famotidine 40mg  He said it might have be cancelled at one point instead of another prescription   Please send to [facility] in [address]

## 2022-09-02 ENCOUNTER — OFFICE VISIT (OUTPATIENT)
Dept: CARDIOLOGY CLINIC | Facility: CLINIC | Age: 42
End: 2022-09-02
Payer: COMMERCIAL

## 2022-09-02 VITALS
SYSTOLIC BLOOD PRESSURE: 142 MMHG | OXYGEN SATURATION: 98 % | HEART RATE: 94 BPM | WEIGHT: 250 LBS | HEIGHT: 72 IN | BODY MASS INDEX: 33.86 KG/M2 | DIASTOLIC BLOOD PRESSURE: 98 MMHG

## 2022-09-02 DIAGNOSIS — I10 PRIMARY HYPERTENSION: ICD-10-CM

## 2022-09-02 DIAGNOSIS — R94.31 ABNORMAL ECG: Primary | ICD-10-CM

## 2022-09-02 DIAGNOSIS — E78.2 MIXED HYPERLIPIDEMIA: ICD-10-CM

## 2022-09-02 PROCEDURE — 3080F DIAST BP >= 90 MM HG: CPT | Performed by: INTERNAL MEDICINE

## 2022-09-02 PROCEDURE — 3077F SYST BP >= 140 MM HG: CPT | Performed by: INTERNAL MEDICINE

## 2022-09-02 PROCEDURE — 99205 OFFICE O/P NEW HI 60 MIN: CPT | Performed by: INTERNAL MEDICINE

## 2022-09-02 PROCEDURE — 93000 ELECTROCARDIOGRAM COMPLETE: CPT | Performed by: INTERNAL MEDICINE

## 2022-09-02 NOTE — PROGRESS NOTES
Sekou Huerta  1980  [address] [facility]  [address]  [place] [address]  [address]  [id number] [phone]    1  Abnormal ECG  POCT ECG    Echo complete w/ contrast if indicated    CT coronary calcium score   2  Mixed hyperlipidemia  POCT ECG    Echo complete w/ contrast if indicated    CT coronary calcium score   3  Primary hypertension  POCT ECG    Echo complete w/ contrast if indicated    CT coronary calcium score       Discussion/Summary:  Today is my 1st visit with the patient  He is interested in discussing cardiovascular risk  His older brother did have a recent CABG  From my standpoint his blood pressure has been running high he is trying to do some conservative changes to see if he can get these numbers down  He is on hydrochlorothiazide will try to give a little more time to reduce sodium intake and have exercise and weight loss  If he does not come down to an acceptable range would add amlodipine 5 mg daily  Lipids are elevated I am doing a coronary calcium score to help determine if we should up titrate statin if he has the presence of early coronary artery disease given his family history would recommend up titrating Lipitor switching to Crestor  An echocardiogram has been ordered due to abnormal ECG  I am also going to evaluate for left ventricular hypertrophy  I will see him back in 3-4 months and evaluate his blood pressure and results of studies  Interval History:  50-year-old gentleman with a history of dyslipidemia and family history of premature coronary disease presents for new patient consultation on behalf of Dr Reji Borrero  He is here today to talk about cardiovascular risk  His brother recently had open heart surgery and bypass  Functionally he has been doing great he is very physically active and has lost some weight over the past 6 months    He has been trying to improve his dietary intake of cholesterol and also lower his blood pressure  There has been no exertional chest pain, shortness of breath, palpitations, lightheadedness, dizziness, or syncope  There has been no lower extremity edema, PND, orthopnea  He is a lifelong nonsmoker    Alcohol intake is minimal   Caffeine intake is minimal     Medical Problems             Problem List     Esophageal dysphagia    Special screening examination for unspecified viral disease    Left ACL tear    Post-traumatic arthritis of right ankle    Abnormal ECG    Primary hypertension    Mixed hyperlipidemia              Past Medical History:   Diagnosis Date    Concussion     unsure of LOC- skiing accident 2/21/21    Wears contact lenses      Social History     Socioeconomic History    Marital status: /Civil Union     Spouse name: Not on file    Number of children: Not on file    Years of education: Not on file    Highest education level: Not on file   Occupational History    Not on file   Tobacco Use    Smoking status: Never Smoker    Smokeless tobacco: Never Used   Vaping Use    Vaping Use: Never used   Substance and Sexual Activity    Alcohol use: Not Currently     Comment: socially    Drug use: Never    Sexual activity: Yes     Partners: Female   Other Topics Concern    Not on file   Social History Narrative    Not on file     Social Determinants of Health     Financial Resource Strain: Not on file   Food Insecurity: Not on file   Transportation Needs: Not on file   Physical Activity: Not on file   Stress: Not on file   Social Connections: Not on file   Intimate Partner Violence: Not on file   Housing Stability: Not on file      Family History   Problem Relation Age of Onset    Hypertension Mother     Hypertension Father     Heart attack Maternal Uncle      Past Surgical History:   Procedure Laterality Date    EGD      KNEE ARTHROSCOPY W/ ACL RECONSTRUCTION  03/2021    ORIF TIBIA & FIBULA FRACTURES Right     right ankle    [place] KNEE SCOPE,AID ANT CRUCIATE REPAIR Left 3/8/2021    Procedure: ARTHROSCOPIC RECONSTRUCTION ANTERIOR CRUCIATE LIGAMENT (ACL) WITH AUTOGRAFT (patellar tendon); MENISCUS REPAIR;  Surgeon: Mustapha Zhou MD;  Location: [facility];  Service: Orthopedics    TIBIA FRACTURE SURGERY         Current Outpatient Medications:     Ascorbic Acid (Vitamin C) 500 MG CAPS, Take 1 capsule by mouth daily, Disp: , Rfl:     atorvastatin (LIPITOR) 10 mg tablet, Take 1 tablet (10 mg total) by mouth daily, Disp: 90 tablet, Rfl: 1    famotidine (PEPCID) 40 MG tablet, Take 1 tablet (40 mg total) by mouth daily as needed for heartburn, Disp: 30 tablet, Rfl: 5    hydrochlorothiazide (HYDRODIURIL) 25 mg tablet, Take 1 tablet (25 mg total) by mouth daily, Disp: 90 tablet, Rfl: 3    Multiple Vitamin (multivitamin) tablet, Take 1 tablet by mouth daily, Disp: , Rfl:     Omega-3 Fatty Acids (Fish Oil) 500 MG CAPS, Take 500 mg by mouth daily, Disp: , Rfl:   No Known Allergies    Labs:     Chemistry        Component Value Date/Time    K 3 5 07/06/2022 1156    CL 99 07/06/2022 1156    CO2 33 (H) 07/06/2022 1156    BUN 15 07/06/2022 1156    CREATININE 0 89 07/06/2022 1156        Component Value Date/Time    CALCIUM 9 5 07/06/2022 1156    ALKPHOS 59 07/06/2022 1156    AST 29 07/06/2022 1156    ALT 38 07/06/2022 1156            No results found for: CHOL  Lab Results   Component Value Date    HDL 48 02/07/2022    HDL 47 06/25/2021    HDL 41 08/23/2019     Lab Results   Component Value Date    LDLCALC 62 02/07/2022    LDLCALC 166 (H) 06/25/2021    LDLCALC 107 (H) 08/23/2019     Lab Results   Component Value Date    TRIG 167 (H) 02/07/2022    TRIG 391 (H) 06/25/2021    TRIG 229 (H) 08/23/2019     No results found for: CHOLHDL    Imaging: No results found  ECG:  Normal sinus rhythm inferior Q-waves      Review of Systems   Constitutional: Negative  HENT: Negative  Eyes: Negative  Cardiovascular: Negative  Respiratory: Negative  Endocrine: Negative      Hematologic/Lymphatic: Negative  Skin: Negative  Musculoskeletal: Negative  Gastrointestinal: Negative  Genitourinary: Negative  Neurological: Negative  Psychiatric/Behavioral: Negative  All other systems reviewed and are negative  Vitals:    09/02/22 1045   BP: 142/98   Pulse: 94   SpO2: 98%     Vitals:    09/02/22 1045   Weight: 113 kg (250 lb)     Height: 6' (182 9 cm)   Body mass index is 33 91 kg/m²  Physical Exam:  Vital signs reviewed  General:  Alert and cooperative, appears stated age, no acute distress  HEENT:  PERRLA, EOMI, no scleral icterus, no conjunctival pallor  Neck:  No lymphadenopathy, no thyromegaly, no carotid bruits, no elevated JVP  Heart:  Regular rate and rhythm, normal S1/S2, no S3/S4, no murmur, rubs or gallops  PMI nondisplaced  Lungs:  Clear to auscultation bilaterally, no wheezes rales or rhonchi  Abdomen:  Soft, non-tender, positive bowel sounds, no rebound or guarding,   no organomegaly   Extremities:  Normal range of motion    No clubbing, cyanosis or edema   Vascular:  2+ pedal pulses  Skin:  No rashes or lesions on exposed skin  Neurologic:  Cranial nerves II-XII grossly intact without focal deficits  Psych:  Normal mood and affect

## 2022-09-15 DIAGNOSIS — K21.9 GASTROESOPHAGEAL REFLUX DISEASE, UNSPECIFIED WHETHER ESOPHAGITIS PRESENT: ICD-10-CM

## 2022-09-15 RX ORDER — FAMOTIDINE 40 MG/1
TABLET, FILM COATED ORAL
Qty: 90 TABLET | Refills: 2 | Status: SHIPPED | OUTPATIENT
Start: 2022-09-15

## 2022-09-20 ENCOUNTER — HOSPITAL ENCOUNTER (OUTPATIENT)
Dept: NON INVASIVE DIAGNOSTICS | Facility: HOSPITAL | Age: 42
Discharge: HOME | End: 2022-09-20
Attending: INTERNAL MEDICINE
Payer: COMMERCIAL

## 2022-09-20 ENCOUNTER — HOSPITAL ENCOUNTER (OUTPATIENT)
Dept: CT IMAGING | Facility: HOSPITAL | Age: 42
Discharge: HOME | End: 2022-09-20
Attending: INTERNAL MEDICINE
Payer: COMMERCIAL

## 2022-09-20 VITALS
SYSTOLIC BLOOD PRESSURE: 142 MMHG | HEART RATE: 94 BPM | BODY MASS INDEX: 33.86 KG/M2 | DIASTOLIC BLOOD PRESSURE: 98 MMHG | WEIGHT: 250 LBS | HEIGHT: 72 IN

## 2022-09-20 DIAGNOSIS — E78.2 MIXED HYPERLIPIDEMIA: ICD-10-CM

## 2022-09-20 DIAGNOSIS — R94.31 ABNORMAL ECG: ICD-10-CM

## 2022-09-20 DIAGNOSIS — I10 PRIMARY HYPERTENSION: ICD-10-CM

## 2022-09-20 LAB
AORTIC ROOT: 3.9 CM
AORTIC VALVE MEAN VELOCITY: 7.2 M/S
APICAL FOUR CHAMBER EJECTION FRACTION: 66 %
ASCENDING AORTA: 3.8 CM
AV LVOT MEAN GRADIENT: 1 MMHG
AV LVOT PEAK GRADIENT: 3 MMHG
AV MEAN GRADIENT: 2 MMHG
AV PEAK GRADIENT: 5 MMHG
AV VELOCITY RATIO: 0.84
DOP CALC AO PEAK VEL: 1.07 M/S
DOP CALC AO VTI: 19.12 CM
DOP CALC LVOT PEAK VEL VTI: 17.88 CM
DOP CALC LVOT PEAK VEL: 0.9 M/S
E WAVE DECELERATION TIME: 227 MS
FRACTIONAL SHORTENING: 38 % (ref 28–44)
INTERVENTRICULAR SEPTUM IN DIASTOLE (PARASTERNAL SHORT AXIS VIEW): 1 CM
INTERVENTRICULAR SEPTUM: 1 CM (ref 0.6–1.1)
LAAS-AP2: 21.3 CM2
LAAS-AP4: 20.6 CM2
LEFT ATRIUM SIZE: 3.8 CM
LEFT INTERNAL DIMENSION IN SYSTOLE: 3.1 CM (ref 2.1–4)
LEFT VENTRICULAR INTERNAL DIMENSION IN DIASTOLE: 5 CM (ref 3.5–6)
LEFT VENTRICULAR POSTERIOR WALL IN END DIASTOLE: 1 CM
LEFT VENTRICULAR STROKE VOLUME: 78 ML
LVSV (TEICH): 78 ML
MV E'TISSUE VEL-SEP: 6 CM/S
MV PEAK A VEL: 0.91 M/S
MV PEAK E VEL: 47 CM/S
MV STENOSIS PRESSURE HALF TIME: 66 MS
MV VALVE AREA P 1/2 METHOD: 3.33 CM2
RIGHT ATRIUM AREA SYSTOLE A4C: 10.8 CM2
RIGHT VENTRICLE ID DIMENSION: 2.8 CM
SL CV LEFT ATRIUM LENGTH A2C: 5.4 CM
SL CV LV EF: 65
SL CV PED ECHO LEFT VENTRICLE DIASTOLIC VOLUME (MOD BIPLANE) 2D: 116 ML
SL CV PED ECHO LEFT VENTRICLE SYSTOLIC VOLUME (MOD BIPLANE) 2D: 39 ML

## 2022-09-20 PROCEDURE — 93306 TTE W/DOPPLER COMPLETE: CPT | Performed by: INTERNAL MEDICINE

## 2022-09-20 PROCEDURE — G1004 CDSM NDSC: HCPCS

## 2022-09-20 PROCEDURE — 93306 TTE W/DOPPLER COMPLETE: CPT

## 2022-09-20 PROCEDURE — 75571 CT HRT W/O DYE W/CA TEST: CPT

## 2022-10-12 PROBLEM — Z11.59 SPECIAL SCREENING EXAMINATION FOR VIRAL DISEASE: Status: RESOLVED | Noted: 2020-06-17 | Resolved: 2022-10-12

## 2022-11-23 ENCOUNTER — TELEPHONE (OUTPATIENT)
Dept: FAMILY MEDICINE CLINIC | Facility: CLINIC | Age: 42
End: 2022-11-23

## 2022-11-23 NOTE — TELEPHONE ENCOUNTER
Patient is calling asking if it matters if he see's you first or should he wait till he has his appointment with the cardiologist then come see you  Please advise patient at [phone]

## 2022-11-29 ENCOUNTER — TELEPHONE (OUTPATIENT)
Dept: UROLOGY | Facility: AMBULATORY SURGERY CENTER | Age: 42
End: 2022-11-29

## 2022-11-29 ENCOUNTER — OFFICE VISIT (OUTPATIENT)
Dept: FAMILY MEDICINE CLINIC | Facility: CLINIC | Age: 42
End: 2022-11-29

## 2022-11-29 VITALS
SYSTOLIC BLOOD PRESSURE: 128 MMHG | DIASTOLIC BLOOD PRESSURE: 86 MMHG | HEART RATE: 120 BPM | WEIGHT: 259 LBS | HEIGHT: 71 IN | BODY MASS INDEX: 36.26 KG/M2 | OXYGEN SATURATION: 97 % | RESPIRATION RATE: 16 BRPM | TEMPERATURE: 96.8 F

## 2022-11-29 DIAGNOSIS — H66.93 BILATERAL OTITIS MEDIA, UNSPECIFIED OTITIS MEDIA TYPE: ICD-10-CM

## 2022-11-29 DIAGNOSIS — H92.03 EAR PAIN, BILATERAL: Primary | ICD-10-CM

## 2022-11-29 RX ORDER — AZITHROMYCIN 250 MG/1
TABLET, FILM COATED ORAL
Qty: 6 TABLET | Refills: 0 | Status: SHIPPED | OUTPATIENT
Start: 2022-11-29 | End: 2022-12-04

## 2022-11-29 RX ORDER — VALACYCLOVIR HYDROCHLORIDE 1 G/1
2000 TABLET, FILM COATED ORAL 2 TIMES DAILY
COMMUNITY
Start: 2022-11-08

## 2022-11-29 NOTE — PATIENT INSTRUCTIONS
Cold Like Symptoms (Pt states he has been having pressure bilateral ear, congestion, SOB, sore throat pt did a home covid test today it was negative pt denies fever   )  Start abx as directed and may use Mucinex D and stay well hydrated  Call if worse  Tylenol or advil prn pain and any fever

## 2022-11-29 NOTE — TELEPHONE ENCOUNTER
New Patient    What is the reason for the patient’s appointment? Vas Consult     What office location does the patient prefer? [place] or [facility]     Imaging/Lab Results:    Do we accept the patient's insurance or is the patient Self-Pay? Yes     Insurance Provider: [address] [place] [address]   Plan Type/Number:  Member ID#: Has the patient had any previous Urologist(s)? No     Have patient records been requested? If not are records showing in [place]: records in epic     Has the patient had any outside testing done? No     Does the patient have a personal history of cancer?  No

## 2022-12-01 DIAGNOSIS — H92.03 EAR PAIN, BILATERAL: Primary | ICD-10-CM

## 2022-12-01 DIAGNOSIS — H66.93 BILATERAL OTITIS MEDIA, UNSPECIFIED OTITIS MEDIA TYPE: ICD-10-CM

## 2022-12-01 DIAGNOSIS — H92.20 BLEEDING FROM EAR, UNSPECIFIED LATERALITY: ICD-10-CM

## 2023-01-01 DIAGNOSIS — E78.5 HYPERLIPIDEMIA, UNSPECIFIED HYPERLIPIDEMIA TYPE: ICD-10-CM

## 2023-01-03 RX ORDER — ATORVASTATIN CALCIUM 10 MG/1
TABLET, FILM COATED ORAL
Qty: 90 TABLET | Refills: 1 | Status: SHIPPED | OUTPATIENT
Start: 2023-01-03

## 2023-01-04 NOTE — PROGRESS NOTES
1/6/2023      No chief complaint on file  Assessment and Plan    40 y o  male managed by new patient    1  Desire for elective sterilization  - exam today as below  - informed consent signed today  - continue contraception  - rx ativan with  to/from on appt date  - shave scrotal/pubic hair day prior to appt date    Return for vasectomy as scheduled  History of Present Illness  Jeremy Lindquist is a 40 y o  male here for evaluation of VASECTOMY CONSULT    History of genitourinary or groin trauma or surgery- none   Fathered children- 2 children, 5 years and 1 months old  Personal and/or mutual desire for permanent sterility- yes, wife   Current contraceptive method- yes  Work/manual labor/lifting- yes, 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  Voiding issues- none  Bleeding issues/thinners- none  Allergies to lidocaine/marcaine/betadine/chromic- none    The patient presents requesting elective sterilization vasectomy  We discussed that vasectomy is in operation performed in the office in order to provide elective sterilization  This procedure should be considered a permanent option  Although there are subspecialists who perform vasectomy reversals, these operations are not 100% successful and are often not covered by insurance meaning they can come with a large out-of-pocket cost  The patient understands this  We reviewed the procedure in depth  Risk and benefits of the procedure were discussed and reviewed  Informed consent was obtained in the office today  The patient was prescribed a benzodiazepine to take one hour prior to the procedure to assist with his comfort  He understands that he will require transportation to and from the office that day if he is to use the benzodiazepine  He also understands he will require semen analysis testing at 8 weeks post procedure to ensure full sterilization  In the interim, he will require contraception during intercourse to avoid an undesired pregnancy       Usually, patients are out of work for 2-3 days  We recommend tight fitting scrotal support following the procedure along with ice packs applied to the scrotum 15 minutes on and 15 minutes off for the first 24 hours  We discussed that we do send the patient home with short course of anti-inflammatory and/or narcotic pain medication  After this discussion, the patient agrees to proceed  We will schedule him in the near future  He agrees to take oral sedative - ativan 2mg one hour prior to procedure  Review of Systems             Vitals  There were no vitals filed for this visit  Physical Exam    General: Well appearing, no distress, appears stated age  HEENT:  Normocephalic, atraumatic  Conjunctiva clear  Respiratory: Nonlabored respirations, no wheeze or cough  Abdomen:  Soft nontender without hernia  No suprapubic or CVA tenderness  Genitourinary: Circumcised penis, normal phallus, orthotopic patent meatus  Testes smooth descended bilaterally into the scrotum nontender with no palpable mass  Palpably normal spermatic cord and vas deferens bilaterally  Musculoskeletal:  Normal range of motion and gait without defecit  Neuro: No gross neurologic defect or abnormality  Steady unassisted gait   Speech and affect normal   Dermatologic: skin warm, dry; no rash erythema or ecchymosis      Past History  Past Medical History:   Diagnosis Date   • Concussion     unsure of LOC- skiing accident 2/21/21   • Wears contact lenses      Social History     Socioeconomic History   • Marital status: /Civil Union     Spouse name: Not on file   • Number of children: Not on file   • Years of education: Not on file   • Highest education level: Not on file   Occupational History   • Not on file   Tobacco Use   • Smoking status: Never   • Smokeless tobacco: Never   Vaping Use   • Vaping Use: Never used   Substance and Sexual Activity   • Alcohol use: Not Currently     Comment: socially   • Drug use: Never   • Sexual activity: Yes Partners: Female   Other Topics Concern   • Not on file   Social History Narrative   • Not on file     Social Determinants of Health     Financial Resource Strain: Not on file   Food Insecurity: Not on file   Transportation Needs: Not on file   Physical Activity: Not on file   Stress: Not on file   Social Connections: Not on file   Intimate Partner Violence: Not on file   Housing Stability: Not on file     Social History     Tobacco Use   Smoking Status Never   Smokeless Tobacco Never     Family History   Problem Relation Age of Onset   • Hypertension Mother    • Hypertension Father    • Heart attack Maternal Uncle        The following portions of the patient's history were reviewed and updated as appropriate: allergies, current medications, past medical history, past social history, past surgical history and problem list     Results  No results found for this or any previous visit (from the past 1 hour(s))  ]  No results found for: PSA  Lab Results   Component Value Date    CALCIUM 9 5 07/06/2022    K 3 5 07/06/2022    CO2 33 (H) 07/06/2022    CL 99 07/06/2022    BUN 15 07/06/2022    CREATININE 0 89 07/06/2022     Lab Results   Component Value Date    WBC 7 02 06/25/2021    HGB 16 0 06/25/2021    HCT 48 4 06/25/2021    MCV 81 (L) 06/25/2021     06/25/2021

## 2023-01-06 ENCOUNTER — OFFICE VISIT (OUTPATIENT)
Dept: UROLOGY | Facility: AMBULATORY SURGERY CENTER | Age: 43
End: 2023-01-06

## 2023-01-06 VITALS
BODY MASS INDEX: 33.8 KG/M2 | HEIGHT: 73 IN | OXYGEN SATURATION: 96 % | RESPIRATION RATE: 18 BRPM | SYSTOLIC BLOOD PRESSURE: 132 MMHG | HEART RATE: 120 BPM | WEIGHT: 255 LBS | DIASTOLIC BLOOD PRESSURE: 80 MMHG

## 2023-01-06 DIAGNOSIS — Z30.09 VASECTOMY EVALUATION: Primary | ICD-10-CM

## 2023-01-06 RX ORDER — LORAZEPAM 2 MG/1
2 TABLET ORAL
Qty: 1 TABLET | Refills: 0 | Status: SHIPPED | OUTPATIENT
Start: 2023-01-06

## 2023-01-06 NOTE — PATIENT INSTRUCTIONS
Vasectomy   AMBULATORY CARE:   A vasectomy  is a procedure to make you sterile  It is a permanent form of birth control  The vas deferens (sperm tubes) are cut so that the semen does not contain sperm  How to prepare for a vasectomy:   Talk to your partner about the type of birth control you will use right after your procedure  You will need to use another form of birth control until tests show that your semen does not contain sperm  This may take up to 3 months  When you have sex, use a condom, or have your partner use hormonal birth control, an IUD, or a diaphragm during this time  This will help prevent pregnancy  Your healthcare provider will tell you how to prepare for your procedure  He or she may want you to bring an athletic supporter with you  You will use it after surgery to support your scrotum while you heal  You may be given instructions for how to clean your scrotum on the day of surgery  Your provider may tell you not to eat or drink anything after midnight on the day of your procedure  He or she will tell you what medicines to take or not take on the day of your procedure  Arrange to have someone drive you home after the procedure and stay with you  What will happen during a vasectomy:  You will be given local anesthesia as an injection in your scrotum  Your scrotum will be numb but you may still feel pressure or pulling  You may also receive a sedative to help keep you calm  A small puncture or incision will be made  The sperm tube will be cut and a small portion removed  One or both ends will then be closed with stitches, medical clips, or a heat treatment  The same procedure will be done to the other sperm tube  Usually no stitches are needed and the tiny puncture wound will heal by itself  Sometimes medical glue is applied to keep the puncture wound closed  What to expect after a vasectomy:  Rest for about 30 minutes after the procedure   Ice and an athletic supporter may be applied to decrease discomfort  Risks of a vasectomy:  Your scrotum may be bruised or inflamed  You may get an infection or a hematoma (buildup of blood)  You may develop long-term pain in your scrotum  You may not become sterile if one or both of your cut sperm tubes grow back together  If you do not return as directed to have your semen checked, you may be at risk for pregnancy  Seek care immediately if:   Your incision wound comes apart  You see blood in your urine or semen  Contact your healthcare provider or surgeon if:   You have a fever  Your wound is red, swollen, or draining pus  You feel pain or burning when you urinate  You have worsening pain in your scrotum, even after you take medicine  You have questions or concerns about your condition or care  Medicines:   NSAIDs  help decrease swelling and pain or fever  This medicine is available with or without a doctor's order  NSAIDs can cause stomach bleeding or kidney problems in certain people  If you take blood thinner medicine, always ask your healthcare provider if NSAIDs are safe for you  Always read the medicine label and follow directions  Take your medicine as directed  Contact your healthcare provider if you think your medicine is not helping or if you have side effects  Tell him or her if you are allergic to any medicine  Keep a list of the medicines, vitamins, and herbs you take  Include the amounts, and when and why you take them  Bring the list or the pill bottles to follow-up visits  Carry your medicine list with you in case of an emergency  Self-care:   Care for your wound as directed  Do not shower for 24 hours  When you do shower, carefully wash the wound with soap and water  Pat the area dry gently  Do not swim or take a bath for at least 1 week  Limit activity  for at least 2 days  Do not play sports, do yard work, or lift anything heavy for at least 2 weeks   Talk to your healthcare provider about when you can return to work  Apply ice  on your scrotum for 15 to 20 minutes every hour for 2 days  Use an ice pack, or put crushed ice in a plastic bag  Cover it with a towel  Ice helps prevent tissue damage and decreases swelling and pain  Wear an athletic supporter for at least 2 days  This will decrease pain and swelling, and protect your wound  Place a cushion such as a washcloth or small towel under your scrotum to elevate it  Do not have sex for at least 1 week  You will not be sterile right away after a vasectomy  It will take time for all the sperm to be cleared from your body  You may need to ejaculate about 20 times or wait up to 3 months for the sperm to clear  Use another form of birth control during this time  Follow up with your healthcare provider as directed: Ask when to return to have your semen checked for sperm  Write down your questions so you remember to ask them during your visits  © Copyright [employer] 2022 Information is for End User's use only and may not be sold, redistributed or otherwise used for commercial purposes  All illustrations and images included in CareNotes® are the copyrighted property of [employer]  or [facility] Rosamaria Walter   The above information is an  only  It is not intended as medical advice for individual conditions or treatments  Talk to your doctor, nurse or pharmacist before following any medical regimen to see if it is safe and effective for you

## 2023-01-12 ENCOUNTER — OFFICE VISIT (OUTPATIENT)
Dept: CARDIOLOGY CLINIC | Facility: CLINIC | Age: 43
End: 2023-01-12

## 2023-01-12 VITALS
HEIGHT: 73 IN | BODY MASS INDEX: 35.39 KG/M2 | SYSTOLIC BLOOD PRESSURE: 128 MMHG | WEIGHT: 267 LBS | OXYGEN SATURATION: 97 % | DIASTOLIC BLOOD PRESSURE: 86 MMHG | HEART RATE: 110 BPM

## 2023-01-12 DIAGNOSIS — E78.2 MIXED HYPERLIPIDEMIA: ICD-10-CM

## 2023-01-12 DIAGNOSIS — I10 PRIMARY HYPERTENSION: Primary | ICD-10-CM

## 2023-01-12 NOTE — PROGRESS NOTES
Yane Florence  1980  [address] [facility]  [address]  [place] [address]  [address] [address] [phone]    1  Primary hypertension        2  Mixed hyperlipidemia            Discussion/Summary: Overall he is doing well and has an excellent functional capacity with no symptoms  Coronary calcium score was personally reviewed with the patient and came back at 2  Agree with continuing statin LDL has dropped significantly and is currently less than 70  Blood pressure on manual recheck was 128/86  I recommend that he remain on hydrochlorothiazide  We talked about diet exercise and healthy lifestyle modifications  Continue to have salt reduction  No further cardiovascular testing we will see him on a yearly basis to reevaluate his risk  Interval History:  78-year-old gentleman with a history of dyslipidemia and family history of premature coronary disease presents for new patient consultation on behalf of Dr Soumya Collins  He is here today to talk about cardiovascular risk  His brother recently had open heart surgery and bypass  Functionally he has been doing great he is very physically active and has lost some weight over the past 6 months  He has been trying to improve his dietary intake of cholesterol and also lower his blood pressure  There has been no exertional chest pain, shortness of breath, palpitations, lightheadedness, dizziness, or syncope  There has been no lower extremity edema, PND, orthopnea  He is a lifelong nonsmoker  Alcohol intake is minimal   Caffeine intake is minimal     Following her last visit he has been doing well functional capacity has been excellent  Denies any exertional chest pain or discomfort  Is been no palpitations, lightheadedness, dizziness, or syncope  Is been no lower extremity edema, PND, orthopnea      Medical Problems             Problem List     Esophageal dysphagia    Special screening examination for unspecified viral disease    Left ACL tear    Post-traumatic arthritis of right ankle    Abnormal ECG    Primary hypertension    Mixed hyperlipidemia              Past Medical History:   Diagnosis Date   • Concussion     unsure of LOC- skiing accident 2/21/21   • Wears contact lenses      Social History     Socioeconomic History   • Marital status: /Civil Union     Spouse name: Not on file   • Number of children: Not on file   • Years of education: Not on file   • Highest education level: Not on file   Occupational History   • Not on file   Tobacco Use   • Smoking status: Never   • Smokeless tobacco: Never   Vaping Use   • Vaping Use: Never used   Substance and Sexual Activity   • Alcohol use: Not Currently     Comment: socially   • Drug use: Never   • Sexual activity: Yes     Partners: Female   Other Topics Concern   • Not on file   Social History Narrative   • Not on file     Social Determinants of Health     Financial Resource Strain: Not on file   Food Insecurity: Not on file   Transportation Needs: Not on file   Physical Activity: Not on file   Stress: Not on file   Social Connections: Not on file   Intimate Partner Violence: Not on file   Housing Stability: Not on file      Family History   Problem Relation Age of Onset   • Hypertension Mother    • Hypertension Father    • Heart attack Maternal Uncle      Past Surgical History:   Procedure Laterality Date   • EGD     • KNEE ARTHROSCOPY W/ ACL RECONSTRUCTION  03/2021   • ORIF TIBIA & FIBULA FRACTURES Right     right ankle   • HI ARTHRS AIDED ANT CRUCIATE LIGM RPR/AGMNTJ/RCNSTJ Left 3/8/2021    Procedure: ARTHROSCOPIC RECONSTRUCTION ANTERIOR CRUCIATE LIGAMENT (ACL) WITH AUTOGRAFT (patellar tendon);  MENISCUS REPAIR;  Surgeon: Jameel Paz MD;  Location: [facility];  Service: Orthopedics   • TIBIA FRACTURE SURGERY         Current Outpatient Medications:   •  Ascorbic Acid (Vitamin C) 500 MG CAPS, Take 1 capsule by mouth daily, Disp: , Rfl:   •  atorvastatin (LIPITOR) 10 mg tablet, TAKE 1 TABLET BY MOUTH EVERY DAY, Disp: 90 tablet, Rfl: 1  •  famotidine (PEPCID) 40 MG tablet, TAKE 1 TABLET BY MOUTH DAILY AS NEEDED FOR HEARTBURN , Disp: 90 tablet, Rfl: 2  •  hydrochlorothiazide (HYDRODIURIL) 25 mg tablet, Take 1 tablet (25 mg total) by mouth daily, Disp: 90 tablet, Rfl: 3  •  Multiple Vitamin (multivitamin) tablet, Take 1 tablet by mouth daily, Disp: , Rfl:   •  LORazepam (ATIVAN) 2 mg tablet, Take 1 tablet (2 mg total) by mouth 60 minutes pre-procedure (Patient not taking: Reported on 1/12/2023), Disp: 1 tablet, Rfl: 0  •  Omega-3 Fatty Acids (Fish Oil) 500 MG CAPS, Take 500 mg by mouth daily (Patient not taking: Reported on 1/12/2023), Disp: , Rfl:   No Known Allergies    Labs:     Chemistry        Component Value Date/Time    K 3 5 07/06/2022 1156    CL 99 07/06/2022 1156    CO2 33 (H) 07/06/2022 1156    BUN 15 07/06/2022 1156    CREATININE 0 89 07/06/2022 1156        Component Value Date/Time    CALCIUM 9 5 07/06/2022 1156    ALKPHOS 59 07/06/2022 1156    AST 29 07/06/2022 1156    ALT 38 07/06/2022 1156            No results found for: CHOL  Lab Results   Component Value Date    HDL 48 02/07/2022    HDL 47 06/25/2021    HDL 41 08/23/2019     Lab Results   Component Value Date    LDLCALC 62 02/07/2022    LDLCALC 166 (H) 06/25/2021    LDLCALC 107 (H) 08/23/2019     Lab Results   Component Value Date    TRIG 167 (H) 02/07/2022    TRIG 391 (H) 06/25/2021    TRIG 229 (H) 08/23/2019     No results found for: CHOLHDL    Imaging: No results found  ECG:  Normal sinus rhythm inferior Q-waves      Review of Systems   Constitutional: Negative  HENT: Negative  Eyes: Negative  Cardiovascular: Negative  Respiratory: Negative  Endocrine: Negative  Hematologic/Lymphatic: Negative  Skin: Negative  Musculoskeletal: Negative  Gastrointestinal: Negative  Genitourinary: Negative  Neurological: Negative  Psychiatric/Behavioral: Negative      All other systems reviewed and are negative  Vitals:    01/12/23 1018   BP: 144/98   Pulse: (!) 110   SpO2: 97%     Vitals:    01/12/23 1018   Weight: 121 kg (267 lb)     Height: 6' 1" (185 4 cm)   Body mass index is 35 23 kg/m²  Physical Exam:  Vital signs reviewed  General:  Alert and cooperative, appears stated age, no acute distress  HEENT:  PERRLA, EOMI, no scleral icterus, no conjunctival pallor  Neck:  No lymphadenopathy, no thyromegaly, no carotid bruits, no elevated JVP  Heart:  Regular rate and rhythm, normal S1/S2, no S3/S4, no murmur, rubs or gallops  PMI nondisplaced  Lungs:  Clear to auscultation bilaterally, no wheezes rales or rhonchi  Abdomen:  Soft, non-tender, positive bowel sounds, no rebound or guarding,   no organomegaly   Extremities:  Normal range of motion    No clubbing, cyanosis or edema   Vascular:  2+ pedal pulses  Skin:  No rashes or lesions on exposed skin  Neurologic:  Cranial nerves II-XII grossly intact without focal deficits  Psych:  Normal mood and affect

## 2023-01-16 ENCOUNTER — OFFICE VISIT (OUTPATIENT)
Dept: FAMILY MEDICINE CLINIC | Facility: CLINIC | Age: 43
End: 2023-01-16

## 2023-01-16 VITALS
WEIGHT: 261.8 LBS | RESPIRATION RATE: 16 BRPM | TEMPERATURE: 96.8 F | HEART RATE: 103 BPM | BODY MASS INDEX: 35.46 KG/M2 | OXYGEN SATURATION: 98 % | HEIGHT: 72 IN | DIASTOLIC BLOOD PRESSURE: 88 MMHG | SYSTOLIC BLOOD PRESSURE: 136 MMHG

## 2023-01-16 DIAGNOSIS — E78.2 MIXED HYPERLIPIDEMIA: Primary | ICD-10-CM

## 2023-01-16 DIAGNOSIS — I10 PRIMARY HYPERTENSION: ICD-10-CM

## 2023-01-16 DIAGNOSIS — R73.03 PREDIABETES: ICD-10-CM

## 2023-01-16 DIAGNOSIS — R13.19 ESOPHAGEAL DYSPHAGIA: ICD-10-CM

## 2023-01-16 NOTE — PATIENT INSTRUCTIONS
Here for f-up and overall doing well and compliant with meds as directed daily  He will see cardiology yearly as directed for hx of family with CAD  Take cholesterol and bp med as directed and rec losing weight via diet and exercise to get BMI lower than 25  Patient to take all meds as directed for HTN and hyperlipidemia  Low sugar diet as directed for hx of prediabetes  Recheck in 6 months as scheduled 7/14/23 with fasting labs as directed a few days prior to office visit  Use sunscreen and monitor for ticks in summer or when outdoors

## 2023-01-16 NOTE — PROGRESS NOTES
Name: Esha Mitchell      : 1980      MRN: [id number]  Encounter Provider: Georgina Perez DO  Encounter Date: 2023   Encounter department: [address] [place] [address]  Chief Complaint   Patient presents with   • Follow-up     Discuss Cardiologist visit       Patient Instructions   Here for f-up and overall doing well and compliant with meds as directed daily  He will see cardiology yearly as directed for hx of family with CAD  Take cholesterol and bp med as directed and rec losing weight via diet and exercise to get BMI lower than 25  Patient to take all meds as directed for HTN and hyperlipidemia  Low sugar diet as directed for hx of prediabetes  Recheck in 6 months as scheduled 23 with fasting labs as directed a few days prior to office visit  Use sunscreen and monitor for ticks in summer or when outdoors  Assessment & Plan     1  Mixed hyperlipidemia  -     Lipid Panel with Direct LDL reflex; Future  -     Comprehensive metabolic panel; Future    2  Primary hypertension  -     Comprehensive metabolic panel; Future    3  Esophageal dysphagia    4  Prediabetes  -     Comprehensive metabolic panel; Future  -     Hemoglobin A1C           Subjective      Here for recheck and doing well  Patient did see Cardiology and did get CT calcium scoring  Patient taking all meds as directed  Review of Systems   Constitutional: Negative  HENT: Negative  Eyes: Negative  Respiratory: Negative  Cardiovascular: Negative  Gastrointestinal: Negative  Endocrine: Negative  Genitourinary: Negative  Musculoskeletal: Negative  Skin: Negative  Allergic/Immunologic: Negative  Neurological: Negative  Hematological: Negative  Psychiatric/Behavioral: Negative          Current Outpatient Medications on File Prior to Visit   Medication Sig   • Ascorbic Acid (Vitamin C) 500 MG CAPS Take 1 capsule by mouth daily   • atorvastatin (LIPITOR) 10 mg tablet TAKE 1 TABLET BY MOUTH EVERY DAY   • famotidine (PEPCID) 40 MG tablet TAKE 1 TABLET BY MOUTH DAILY AS NEEDED FOR HEARTBURN  • hydrochlorothiazide (HYDRODIURIL) 25 mg tablet Take 1 tablet (25 mg total) by mouth daily   • Multiple Vitamin (multivitamin) tablet Take 1 tablet by mouth daily   • LORazepam (ATIVAN) 2 mg tablet Take 1 tablet (2 mg total) by mouth 60 minutes pre-procedure (Patient not taking: Reported on 1/12/2023)   • [DISCONTINUED] Omega-3 Fatty Acids (Fish Oil) 500 MG CAPS Take 500 mg by mouth daily (Patient not taking: Reported on 1/12/2023)       Objective     /88 (BP Location: Left arm, Patient Position: Sitting, Cuff Size: Large)   Pulse 103   Temp (!) 96 8 °F (36 °C) (Temporal)   Resp 16   Ht 5' 11 5" (1 816 m)   Wt 119 kg (261 lb 12 8 oz)   SpO2 98%   BMI 36 00 kg/m²     Physical Exam  Constitutional:       Appearance: He is well-developed  HENT:      Head: Normocephalic and atraumatic  Eyes:      Conjunctiva/sclera: Conjunctivae normal       Pupils: Pupils are equal, round, and reactive to light  Cardiovascular:      Rate and Rhythm: Normal rate and regular rhythm  Heart sounds: Normal heart sounds  Pulmonary:      Effort: Pulmonary effort is normal       Breath sounds: Normal breath sounds  Musculoskeletal:         General: Normal range of motion  Cervical back: Normal range of motion and neck supple  Skin:     General: Skin is warm and dry  Capillary Refill: Capillary refill takes less than 2 seconds  Neurological:      General: No focal deficit present  Mental Status: He is alert and oriented to person, place, and time  Deep Tendon Reflexes: Reflexes are normal and symmetric  Psychiatric:         Mood and Affect: Mood normal          Behavior: Behavior normal          Thought Content:  Thought content normal          Judgment: Judgment normal        Jenna Weinstein, DO

## 2023-03-10 ENCOUNTER — PROCEDURE VISIT (OUTPATIENT)
Dept: UROLOGY | Facility: AMBULATORY SURGERY CENTER | Age: 43
End: 2023-03-10

## 2023-03-10 VITALS
DIASTOLIC BLOOD PRESSURE: 82 MMHG | HEIGHT: 73 IN | WEIGHT: 261 LBS | OXYGEN SATURATION: 98 % | BODY MASS INDEX: 34.59 KG/M2 | SYSTOLIC BLOOD PRESSURE: 168 MMHG | HEART RATE: 95 BPM

## 2023-03-10 DIAGNOSIS — Z98.52 STATUS POST VASECTOMY: ICD-10-CM

## 2023-03-10 DIAGNOSIS — Z30.2 ENCOUNTER FOR STERILIZATION: ICD-10-CM

## 2023-03-10 DIAGNOSIS — Z30.09 VASECTOMY EVALUATION: Primary | ICD-10-CM

## 2023-03-10 NOTE — PROGRESS NOTES
Vasectomy     Date/Time 3/10/2023 2:31 PM     Performed by  Ferny Blackwood MD     Authorized by Ferny Blackwood MD            Jordan Reich is a 44 yr old male who request elective sterilization with vasectomy  He is  with 2 children  He desires no further children  Vasectomy Procedure Note:  Risks and benefits of vasectomy were previously discussed  Informed consent was obtained at his prior office visit  The patient had taken Ativan as prescribed  The patient was placed in the supine position  His genitalia was prepped and draped in a sterile fashion  The left vas deferens was grasped and presented to the midline of the scrotum  2% lidocaine was used to anesthetize the skin, subcutaneous tissue, and left vas deferens  A scalpeless opening was made in the midline of the scrotum  The left vas deferens was grasped and presented into the surgical field  A #15 blade was used to make a longitudinal incision in the sheath of the vas deferens  The vas itself was then dissected free from the surrounding tissue  Clamps were placed proximally and distally and a 1 cm segment of the vas deferens was excised  Silk ties were applied and exposed ends were fulgurated  Hemostasis was excellent  The vas was returned to its natural anatomic position and the same procedure was then repeated on the patient's right side  A single stitch was placed through the skin and dartos to reapproximate the defect  Bacitracin ointment was applied  The patient is status post vasectomy  I recommended rest, ice, and scrotal support  I've asked that he return in the next 2-3 weeks for a post vasectomy check  Tylenol/Advil as needed for pain  The patient understands that he is not yet considered sterile  I recommend a single post vasectomy semen analysis at 8 weeks  In the interim I have recommended contraception to avoid an undesired pregnancy

## 2023-04-17 NOTE — PROGRESS NOTES
Name: Carlos Manuel Yepze      : 1980      MRN: [id number]  Encounter Provider: Manuel Simms DO  Encounter Date: 2022   Encounter department: [address] [place] [address]  Chief Complaint   Patient presents with   • Cold Like Symptoms     Pt states he has been having pressure bilateral ear, congestion, SOB, sore throat pt did a home covid test today it was negative pt denies fever  Patient Instructions    Cold Like Symptoms (Pt states he has been having pressure bilateral ear, congestion, SOB, sore throat pt did a home covid test today it was negative pt denies fever   )  Start abx as directed and may use Mucinex D and stay well hydrated  Call if worse  Tylenol or advil prn pain and any fever  Assessment & Plan     1  Ear pain, bilateral  -     azithromycin (Zithromax) 250 mg tablet; Take 2 tablets (500 mg total) by mouth daily for 1 day, THEN 1 tablet (250 mg total) daily for 4 days  2  Bilateral otitis media, unspecified otitis media type  -     azithromycin (Zithromax) 250 mg tablet; Take 2 tablets (500 mg total) by mouth daily for 1 day, THEN 1 tablet (250 mg total) daily for 4 days  Subjective      Cold Like Symptoms (Pt states he has been having pressure bilateral ear, congestion, SOB, sore throat pt did a home covid test today it was negative pt denies fever   ) No fever, covid negative this am at home  Covid and flu booster UTD  Earache   There is pain in both ears  This is a new problem  The current episode started yesterday  The problem occurs constantly  The problem has been gradually worsening  There has been no fever  The fever has been present for less than 1 day  The pain is at a severity of 6/10  Associated symptoms include headaches, hearing loss, neck pain, rhinorrhea and a sore throat  Pertinent negatives include no abdominal pain, coughing, diarrhea, ear discharge, rash or vomiting  Review of Systems   Constitutional: Negative      HENT: No answer voicemail not setup. Letter sent. Positive for ear pain, hearing loss, rhinorrhea and sore throat  Negative for ear discharge  Eyes: Negative  Respiratory: Negative  Negative for cough  Cardiovascular: Negative  Gastrointestinal: Negative  Negative for abdominal pain, diarrhea and vomiting  Endocrine: Negative  Genitourinary: Negative  Musculoskeletal: Positive for neck pain  Skin: Negative  Negative for rash  Allergic/Immunologic: Negative  Neurological: Positive for headaches  Hematological: Negative  Psychiatric/Behavioral: Negative  Current Outpatient Medications on File Prior to Visit   Medication Sig   • Ascorbic Acid (Vitamin C) 500 MG CAPS Take 1 capsule by mouth daily   • atorvastatin (LIPITOR) 10 mg tablet Take 1 tablet (10 mg total) by mouth daily   • famotidine (PEPCID) 40 MG tablet TAKE 1 TABLET BY MOUTH DAILY AS NEEDED FOR HEARTBURN  • hydrochlorothiazide (HYDRODIURIL) 25 mg tablet Take 1 tablet (25 mg total) by mouth daily   • Multiple Vitamin (multivitamin) tablet Take 1 tablet by mouth daily   • Omega-3 Fatty Acids (Fish Oil) 500 MG CAPS Take 500 mg by mouth daily   • valACYclovir (VALTREX) 1,000 mg tablet Take 2,000 mg by mouth 2 (two) times a day       Objective     /86 (BP Location: Left arm, Patient Position: Sitting, Cuff Size: Adult)   Pulse (!) 120   Temp (!) 96 8 °F (36 °C) (Temporal)   Resp 16   Ht 5' 11 1" (1 806 m)   Wt 117 kg (259 lb)   SpO2 97%   BMI 36 02 kg/m²     Physical Exam  Constitutional:       Appearance: He is well-developed and well-nourished  HENT:      Head: Normocephalic and atraumatic  Right Ear: External ear normal       Left Ear: External ear normal       Ears:      Comments: TM's B/L erythematous     Nose: Nose normal       Mouth/Throat:      Mouth: Oropharynx is clear and moist    Eyes:      Extraocular Movements: EOM normal       Conjunctiva/sclera: Conjunctivae normal       Pupils: Pupils are equal, round, and reactive to light  Cardiovascular:      Rate and Rhythm: Normal rate and regular rhythm  Pulses: Intact distal pulses  Heart sounds: Normal heart sounds  Pulmonary:      Effort: Pulmonary effort is normal       Breath sounds: Normal breath sounds  Musculoskeletal:         General: Normal range of motion  Cervical back: Normal range of motion and neck supple  Skin:     General: Skin is warm and dry  Neurological:      Mental Status: He is alert and oriented to person, place, and time  Deep Tendon Reflexes: Reflexes are normal and symmetric     Psychiatric:         Mood and Affect: Mood and affect normal          Behavior: Behavior normal        Grant Patient, DO

## 2023-07-07 DIAGNOSIS — E78.5 HYPERLIPIDEMIA, UNSPECIFIED HYPERLIPIDEMIA TYPE: ICD-10-CM

## 2023-07-07 DIAGNOSIS — I10 ESSENTIAL HYPERTENSION: ICD-10-CM

## 2023-07-10 RX ORDER — ATORVASTATIN CALCIUM 10 MG/1
TABLET, FILM COATED ORAL
Qty: 90 TABLET | Refills: 1 | Status: SHIPPED | OUTPATIENT
Start: 2023-07-10

## 2023-07-10 RX ORDER — HYDROCHLOROTHIAZIDE 25 MG/1
25 TABLET ORAL DAILY
Qty: 90 TABLET | Refills: 3 | Status: SHIPPED | OUTPATIENT
Start: 2023-07-10

## 2023-07-12 ENCOUNTER — APPOINTMENT (OUTPATIENT)
Dept: LAB | Facility: CLINIC | Age: 43
End: 2023-07-12
Payer: COMMERCIAL

## 2023-07-12 DIAGNOSIS — I10 PRIMARY HYPERTENSION: ICD-10-CM

## 2023-07-12 DIAGNOSIS — E78.2 MIXED HYPERLIPIDEMIA: ICD-10-CM

## 2023-07-12 DIAGNOSIS — R73.03 PREDIABETES: ICD-10-CM

## 2023-07-12 LAB
ALBUMIN SERPL BCP-MCNC: 4.2 G/DL (ref 3.5–5)
ALP SERPL-CCNC: 93 U/L (ref 46–116)
ALT SERPL W P-5'-P-CCNC: 47 U/L (ref 12–78)
ANION GAP SERPL CALCULATED.3IONS-SCNC: 4 MMOL/L
AST SERPL W P-5'-P-CCNC: 29 U/L (ref 5–45)
BILIRUB SERPL-MCNC: 0.36 MG/DL (ref 0.2–1)
BUN SERPL-MCNC: 18 MG/DL (ref 5–25)
CALCIUM SERPL-MCNC: 9.4 MG/DL (ref 8.3–10.1)
CHLORIDE SERPL-SCNC: 102 MMOL/L (ref 96–108)
CHOLEST SERPL-MCNC: 230 MG/DL
CO2 SERPL-SCNC: 27 MMOL/L (ref 21–32)
CREAT SERPL-MCNC: 1.1 MG/DL (ref 0.6–1.3)
GFR SERPL CREATININE-BSD FRML MDRD: 82 ML/MIN/1.73SQ M
GLUCOSE P FAST SERPL-MCNC: 129 MG/DL (ref 65–99)
HDLC SERPL-MCNC: 46 MG/DL
LDLC SERPL CALC-MCNC: 114 MG/DL (ref 0–100)
POTASSIUM SERPL-SCNC: 3.4 MMOL/L (ref 3.5–5.3)
PROT SERPL-MCNC: 8.9 G/DL (ref 6.4–8.4)
SODIUM SERPL-SCNC: 133 MMOL/L (ref 135–147)
TRIGL SERPL-MCNC: 351 MG/DL

## 2023-07-12 PROCEDURE — 80053 COMPREHEN METABOLIC PANEL: CPT

## 2023-07-12 PROCEDURE — 36415 COLL VENOUS BLD VENIPUNCTURE: CPT

## 2023-07-12 PROCEDURE — 80061 LIPID PANEL: CPT

## 2023-07-13 LAB
EST. AVERAGE GLUCOSE BLD GHB EST-MCNC: 120 MG/DL
HBA1C MFR BLD: 5.8 %

## 2023-07-17 ENCOUNTER — OFFICE VISIT (OUTPATIENT)
Dept: FAMILY MEDICINE CLINIC | Facility: CLINIC | Age: 43
End: 2023-07-17
Payer: COMMERCIAL

## 2023-07-17 VITALS
OXYGEN SATURATION: 97 % | WEIGHT: 260 LBS | BODY MASS INDEX: 35.21 KG/M2 | HEIGHT: 72 IN | TEMPERATURE: 96 F | HEART RATE: 94 BPM | DIASTOLIC BLOOD PRESSURE: 96 MMHG | SYSTOLIC BLOOD PRESSURE: 138 MMHG

## 2023-07-17 DIAGNOSIS — Z00.00 HEALTH CARE MAINTENANCE: Primary | ICD-10-CM

## 2023-07-17 DIAGNOSIS — E78.1 HYPERTRIGLYCERIDEMIA: ICD-10-CM

## 2023-07-17 DIAGNOSIS — E66.9 OBESITY (BMI 30-39.9): ICD-10-CM

## 2023-07-17 DIAGNOSIS — R73.03 PREDIABETES: ICD-10-CM

## 2023-07-17 DIAGNOSIS — K21.9 GASTROESOPHAGEAL REFLUX DISEASE, UNSPECIFIED WHETHER ESOPHAGITIS PRESENT: ICD-10-CM

## 2023-07-17 DIAGNOSIS — I10 PRIMARY HYPERTENSION: ICD-10-CM

## 2023-07-17 DIAGNOSIS — E78.2 MIXED HYPERLIPIDEMIA: ICD-10-CM

## 2023-07-17 PROCEDURE — 99396 PREV VISIT EST AGE 40-64: CPT | Performed by: FAMILY MEDICINE

## 2023-07-17 NOTE — PATIENT INSTRUCTIONS
Here for General PE and needs to lose weight and take BP med and cholesterol med and gerd medication when needed. Use sunscreen and monitor for ticks. Use sunscreen and monitor for ticks. Recheck labs in 6 months for hx of prediabetes and hypertriglyceridemia and HTN. Hx of gerd. Try to get at least 8 hours of sleep per night. Patient does snore occasionally. Denies witnessed apneic episodes. Reviewed labs done. Take all meds as directed. Patient's wife who is a physician sometimes checks BP at home. BP at 120's over 80's. Call if any problems. Rec 3 grams of O3FA daily.

## 2023-07-17 NOTE — PROGRESS NOTES
Name: Arabella Crawford      : 1980      MRN: [id number]  Encounter Provider: Trudy Luther DO  Encounter Date: 2023   Encounter department: [address] [place] [address]  Chief Complaint   Patient presents with   • Physical Exam     Patient Instructions   Here for General PE and needs to lose weight and take BP med and cholesterol med and gerd medication when needed. Use sunscreen and monitor for ticks. Use sunscreen and monitor for ticks. Recheck labs in 6 months for hx of prediabetes and hypertriglyceridemia and HTN. Hx of gerd. Try to get at least 8 hours of sleep per night. Patient does snore occasionally. Denies witnessed apneic episodes. Reviewed labs done. Take all meds as directed. Patient's wife who is a physician sometimes checks BP at home. BP at 120's over 80's. Call if any problems. Rec 3 grams of O3FA daily. Assessment & Plan     1. Health care maintenance  -     Comprehensive metabolic panel; Future; Expected date: 2024  -     Hemoglobin A1C; Future; Expected date: 2024  -     Lipid Panel with Direct LDL reflex; Future; Expected date: 2024    2. Primary hypertension    3. Mixed hyperlipidemia    4. Gastroesophageal reflux disease, unspecified whether esophagitis present  -     CBC and differential; Future; Expected date: 2024    5. Hypertriglyceridemia    6. Prediabetes    7. Obesity (BMI 30-39.9)  -     Ambulatory Referral to Weight Management; Future           Subjective      Patientis here for general PE and has 2 daughters and is . Patient trying to eat healthy and exercise. Patient is an . Usually BP at home you 120/s over 80's. Gets at least 6-7 hours per night. Review of Systems   Constitutional: Negative. HENT: Negative. Eyes: Negative. Respiratory: Negative. Cardiovascular: Negative. Gastrointestinal: Negative. Endocrine: Negative. Genitourinary: Negative. Musculoskeletal: Negative.     Skin: Negative. Allergic/Immunologic: Negative. Neurological: Negative. Hematological: Negative. Psychiatric/Behavioral: Negative. Current Outpatient Medications on File Prior to Visit   Medication Sig   • Ascorbic Acid (Vitamin C) 500 MG CAPS Take 1 capsule by mouth daily   • atorvastatin (LIPITOR) 10 mg tablet TAKE 1 TABLET BY MOUTH EVERY DAY   • famotidine (PEPCID) 40 MG tablet TAKE 1 TABLET BY MOUTH DAILY AS NEEDED FOR HEARTBURN. • hydrochlorothiazide (HYDRODIURIL) 25 mg tablet TAKE 1 TABLET (25 MG TOTAL) BY MOUTH DAILY. • Multiple Vitamin (multivitamin) tablet Take 1 tablet by mouth daily   • [DISCONTINUED] LORazepam (ATIVAN) 2 mg tablet Take 1 tablet (2 mg total) by mouth 60 minutes pre-procedure (Patient not taking: Reported on 1/12/2023)       Objective     /96   Pulse 94   Temp (!) 96 °F (35.6 °C) (Temporal)   Ht 6' (1.829 m)   Wt 118 kg (260 lb)   SpO2 97%   BMI 35.26 kg/m²     Physical Exam  Constitutional:       Appearance: Normal appearance. He is well-developed. HENT:      Head: Normocephalic and atraumatic. Right Ear: Tympanic membrane, ear canal and external ear normal.      Left Ear: Tympanic membrane, ear canal and external ear normal.      Nose: Nose normal.      Mouth/Throat:      Mouth: Mucous membranes are moist.   Eyes:      Conjunctiva/sclera: Conjunctivae normal.      Pupils: Pupils are equal, round, and reactive to light. Cardiovascular:      Rate and Rhythm: Normal rate and regular rhythm. Pulses: Normal pulses. Heart sounds: Normal heart sounds. Pulmonary:      Effort: Pulmonary effort is normal.      Breath sounds: Normal breath sounds. Abdominal:      General: Abdomen is flat. Bowel sounds are normal.      Palpations: Abdomen is soft. Genitourinary:     Penis: Normal.       Testes: Normal.   Musculoskeletal:         General: Normal range of motion. Cervical back: Normal range of motion and neck supple.    Skin:     General: Skin is warm and dry. Capillary Refill: Capillary refill takes less than 2 seconds. Neurological:      General: No focal deficit present. Mental Status: He is alert and oriented to person, place, and time. Mental status is at baseline. Deep Tendon Reflexes: Reflexes are normal and symmetric. Psychiatric:         Mood and Affect: Mood normal.         Behavior: Behavior normal.         Thought Content:  Thought content normal.         Judgment: Judgment normal.       Angela Jump, DO

## 2023-09-07 NOTE — H&P
History and Physical -  [facility]  Hardik Rodas 45 y o  male MRN: [id number]                  HPI: Hardik Rodas is a 45y o  year old male who presents for EGD for evaluation of dysphagia    REVIEW OF SYSTEMS: Per the HPI, and otherwise unremarkable  Historical Information   History reviewed  No pertinent past medical history  Past Surgical History:   Procedure Laterality Date    TIBIA FRACTURE SURGERY       Social History   Social History     Substance and Sexual Activity   Alcohol Use Yes    Frequency: Monthly or less    Comment: socially     Social History     Substance and Sexual Activity   Drug Use Never     Social History     Tobacco Use   Smoking Status Never Smoker   Smokeless Tobacco Never Used     History reviewed  No pertinent family history  Meds/Allergies       (Not in a hospital admission)    No Known Allergies    Objective     BP (!) 159/105   Pulse 83   Temp 97 7 °F (36 5 °C) (Temporal)   Resp 18   Ht 6' (1 829 m)   Wt 116 kg (255 lb)   SpO2 97%   BMI 34 58 kg/m²       PHYSICAL EXAM    Gen: NAD  CV: RRR  CHEST: Clear  ABD: soft, NT/ND  EXT: no edema      ASSESSMENT/PLAN:  This is a 45y o  year old male here for EGD, and he is stable and optimized for his procedure 
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 Metastatic carcinomas present in 4 out of 14 lymph nodes. Final esophageal margin, resection  Segment of esophagus negative for carcinoma  Two lymph nodes negative for carcinoma.

## 2023-10-20 DIAGNOSIS — M19.171 POST-TRAUMATIC ARTHRITIS OF RIGHT ANKLE: ICD-10-CM

## 2023-10-20 DIAGNOSIS — R11.0 NAUSEA: Primary | ICD-10-CM

## 2023-10-20 RX ORDER — ONDANSETRON 4 MG/1
4 TABLET, ORALLY DISINTEGRATING ORAL EVERY 6 HOURS PRN
Qty: 20 TABLET | Refills: 1 | Status: SHIPPED | OUTPATIENT
Start: 2023-10-20

## 2023-10-20 RX ORDER — IBUPROFEN 800 MG/1
800 TABLET ORAL EVERY 8 HOURS PRN
Qty: 50 TABLET | Refills: 2 | Status: SHIPPED | OUTPATIENT
Start: 2023-10-20 | End: 2023-12-04

## 2023-12-03 DIAGNOSIS — M19.171 POST-TRAUMATIC ARTHRITIS OF RIGHT ANKLE: ICD-10-CM

## 2023-12-03 DIAGNOSIS — K21.9 GASTROESOPHAGEAL REFLUX DISEASE, UNSPECIFIED WHETHER ESOPHAGITIS PRESENT: ICD-10-CM

## 2023-12-04 RX ORDER — IBUPROFEN 800 MG/1
800 TABLET ORAL EVERY 8 HOURS PRN
Qty: 50 TABLET | Refills: 2 | Status: SHIPPED | OUTPATIENT
Start: 2023-12-04

## 2023-12-04 RX ORDER — FAMOTIDINE 40 MG/1
TABLET, FILM COATED ORAL
Qty: 90 TABLET | Refills: 2 | Status: SHIPPED | OUTPATIENT
Start: 2023-12-04

## 2023-12-08 ENCOUNTER — PATIENT OUTREACH (OUTPATIENT)
Dept: BARIATRICS | Facility: CLINIC | Age: 43
End: 2023-12-08

## 2023-12-08 NOTE — PROGRESS NOTES
Attempted to contact patient to conduct pre-visit screening. Left voicemail and contact information.

## 2023-12-08 NOTE — PROGRESS NOTES
Relevant Medical History: Medical History: PRE-DIABETIC, HTN, and HYPERLIPIDEMIA  Relevant Labs: elevated glucose (fasting), elevated cholesterol, elevated triglycerides  Relevant Medications:    Weight History     Self Reported Current Wt: 255-260#  Self Reported Current Ht: 6'1  Highest Weight: 276#  Lowest Weight: 220#  BMI: BMI 30-34.9    Past Attempts at Weight Loss: Self Created Diet and Exercise    Food Recall  Breakfast: bagel or eggs or banana and cereal (Cheerios or wheat cereal)  Snack: apple, banana, or nuts  Lunch: skip or low sodium turkey sandwich or peanut butter and jelly or leftovers  Snack:   Dinner: fish or chicken, carbohydrate (brown rice or beans), vegetable   Snack:     Frequency of Dining Out: rare  Beverages: water  Volume of Beverage Intake: 90+oz    Would the patient benefit from visit with [facility] specialist?: Stress Eating (stressful job per patient report)    Physical Activity Intake  Activity: none currently  Frequency of Exercise: none currently  Physical Limitations to Exercise: n/a    Sleep Quality  Wake time: 4:30-5AM  Bed: 9:30-10PM  Average hours of sleep per night?: 7  Is sleep refreshing?: No  Any history of sleep apnea?: No    Review of Programs  Overview of medical weight management programs provided?: Yes   Is patient interested in discussing medication?: Yes   Is patient interested in discussing bariatric surgery?: No  Does patient know which pathway they are interested in?: No

## 2023-12-11 ENCOUNTER — CONSULT (OUTPATIENT)
Dept: BARIATRICS | Facility: CLINIC | Age: 43
End: 2023-12-11
Payer: COMMERCIAL

## 2023-12-11 VITALS
BODY MASS INDEX: 37.04 KG/M2 | DIASTOLIC BLOOD PRESSURE: 85 MMHG | HEIGHT: 71 IN | RESPIRATION RATE: 16 BRPM | HEART RATE: 99 BPM | SYSTOLIC BLOOD PRESSURE: 128 MMHG | WEIGHT: 264.6 LBS

## 2023-12-11 DIAGNOSIS — I10 PRIMARY HYPERTENSION: ICD-10-CM

## 2023-12-11 DIAGNOSIS — E01.0 THYROMEGALY: ICD-10-CM

## 2023-12-11 DIAGNOSIS — E78.2 MIXED HYPERLIPIDEMIA: ICD-10-CM

## 2023-12-11 DIAGNOSIS — E66.9 OBESITY (BMI 30-39.9): Primary | ICD-10-CM

## 2023-12-11 DIAGNOSIS — R73.03 PREDIABETES: ICD-10-CM

## 2023-12-11 PROCEDURE — 99203 OFFICE O/P NEW LOW 30 MIN: CPT | Performed by: INTERNAL MEDICINE

## 2023-12-11 RX ORDER — TIRZEPATIDE 2.5 MG/.5ML
2.5 INJECTION, SOLUTION SUBCUTANEOUS WEEKLY
Qty: 2 ML | Refills: 0 | Status: SHIPPED | OUTPATIENT
Start: 2023-12-11 | End: 2023-12-11

## 2023-12-11 NOTE — PATIENT INSTRUCTIONS
General Recommendations:  Nutrition:  Eat breakfast daily. Do not skip meals. Food log (ie.) www.myfitnesspal.com, sparkpeople. com, loseit.com, calorieking. com, etc.    Practice mindful eating. Be sure to set aside time to eat, eat slowly, and savor your food. Hydration: At least 64oz of water daily. No sugar sweetened beverages. No juice (eat the fruit instead). Exercise:  Studies have shown that the ideal exercise goal is somewhere between 150 to 300 minutes of moderate intensity exercise a week. Start with exercising 10 minutes every other day and gradually increase physical activity with a goal of at least 150 minutes of moderate intensity exercise a week, divided over at least 3 days a week. An example of this would be exercising 30 minutes a day, 5 days a week. Resistance training can increase muscle mass and increase our resting metabolic rate. FULL BODY resistance training is recommended 2-3 times a week. Do not do this on consecutive days to allow for muscle recovery. Aim for a bare minimum 5000 steps, even on days you do not exercise. Monitoring:   Weigh yourself daily. If this causes undue stress, then just weigh yourself once a week. Weigh yourself the same time of the day with the same amount of clothing on. Preferably this should be done after waking up, before you eat, and with no clothing or minimal clothing on. Specific Goals:  Food log (ie.) www.myfitnesspal.com,[employer],Evocalize,calorieking. com,etc.     Gradually increase physical activity to a goal of 5 days per week for 30 minutes of MODERATE intensity PLUS 2 days per week of FULL BODY resistance training    Have thyroid test and thyroid ultrasound done. If these look fine then we will star Zepbound (if approved by your insurance). Side effects of Mounjaro include nausea, vomiting, diarrhea, or constipation. Keep an eye on your heart rate while on Mounjaro.  If you resting heart rate is greater than 100 beats per minutes, please notify me. If you develop severe abdominal pain, stop Mounjaro and go to the emergency room, as that could be a sign of pancreatitis. Calorie goal:  2562-0410 calories a day (Provided with meal plan to follow). Consider meal planning package.     Return visit:  3-4 month

## 2023-12-11 NOTE — PROGRESS NOTES
Assessment/Plan:  Lori Giles was seen today for consult. Diagnoses and all orders for this visit:    Obesity (BMI 30-39.9)  -     Ambulatory Referral to Weight Management  -     Ambulatory referral to Sleep Medicine; Future  -     TSH, 3rd generation with Free T4 reflex; Future  -     US thyroid; Future  -     Discontinue: tirzepatide (Zepbound) 2.5 mg/0.5 mL auto-injector; Inject 0.5 mL (2.5 mg total) under the skin once a week for 28 days    Primary hypertension    Mixed hyperlipidemia    Prediabetes    Thyromegaly  -     TSH, 3rd generation with Free T4 reflex; Future  -     US thyroid; Future       - Discussed options of HealthyCORE-Intensive Lifestyle Intervention Program, Very Low Calorie Diet-VLCD, and Conservative Program and the role of weight loss medications. - Explained the importance of making lifestyle changes. Has already made significant lifestyle changes and plateaued in weight loss. He is interested in a GLP-1 agonist after extensive discussion on the various antiobesity medications and the risk and benefit of each. He does not have a history of pancreatitis. He does not have a personal or family history of thyroid cancer. Should the ultrasound be unrevealing for any suspicious findings then he may proceed with a GLP-1 receptor agonist assuming his insurance covers it and availability is not an issue. I recommend that he first have a TSH and thyroid ultrasound as the right thyroid did feel larger than the left. Should these test be normal then I will try and get him approved for Zepbound as Wegovy and Saxenda have had issues with shortages/lack of availability.   - Patient is interested in pursuing Conservative Program  - Initial weight loss goal of 5-10% weight loss for improved health as studies have shown this is where we see the greatest impact on improving health and decreasing risk of obesity related conditions.   - Weight loss can improve patient's co-morbid conditions and/or prevent weight-related complications. - Stop Claudetta Heath 4/8 -   - Labs reviewed: As below. General Recommendations:  Nutrition:  Eat breakfast daily. Do not skip meals. Food log (ie.) www.myfitnesspal.com, sparkpeople. com, loseit.com, calorieking. com, etc.    Practice mindful eating. Be sure to set aside time to eat, eat slowly, and savor your food. Hydration: At least 64oz of water daily. No sugar sweetened beverages. No juice (eat the fruit instead). Exercise:  Studies have shown that the ideal exercise goal is somewhere between 150 to 300 minutes of moderate intensity exercise a week. Start with exercising 10 minutes every other day and gradually increase physical activity with a goal of at least 150 minutes of moderate intensity exercise a week, divided over at least 3 days a week. An example of this would be exercising 30 minutes a day, 5 days a week. Resistance training can increase muscle mass and increase our resting metabolic rate. FULL BODY resistance training is recommended 2-3 times a week. Do not do this on consecutive days to allow for muscle recovery. Aim for a bare minimum 5000 steps, even on days you do not exercise. Monitoring:   Weigh yourself daily. If this causes undue stress, then just weigh yourself once a week. Weigh yourself the same time of the day with the same amount of clothing on. Preferably this should be done after waking up, before you eat, and with no clothing or minimal clothing on. Specific Goals:  Food log (ie.) www.myfitnesspal.com,[employer],YouView,calorieking. com,etc.     Gradually increase physical activity to a goal of 5 days per week for 30 minutes of MODERATE intensity PLUS 2 days per week of FULL BODY resistance training    Have thyroid test and thyroid ultrasound done. If these look fine then we will star Zepbound (if approved by your insurance). Side effects of Mounjaro include nausea, vomiting, diarrhea, or constipation.  Keep an eye on your heart rate while on Mounjaro. If you resting heart rate is greater than 100 beats per minutes, please notify me. If you develop severe abdominal pain, stop Mounjaro and go to the emergency room, as that could be a sign of pancreatitis. Calorie goal:  3644-9018 calories a day (Provided with meal plan to follow). Consider meal planning package. Return visit:  3-4 month    Total time spent reviewing chart, interviewing patient, examining patient, discussing plan, answering all questions, and documentin min.       ______________________________________________________________________      Subjective:   Chief Complaint   Patient presents with    Consult     MWM Consult; Gw-230 ; waist-45in ; Stop bang-       HPI: Licha Santiago  is a 44 y.o. male with history of hypertension, hyperlipidemia, prediabetes, and excess weight, here to pursue weight loss management. Previous notes and records have been reviewed. HPI  Wt Readings from Last 20 Encounters:   23 120 kg (264 lb 9.6 oz)   23 118 kg (260 lb)   03/10/23 118 kg (261 lb)   23 119 kg (261 lb 12.8 oz)   23 121 kg (267 lb)   23 116 kg (255 lb)   22 117 kg (259 lb)   22 117 kg (259 lb)   22 117 kg (259 lb)   22 113 kg (250 lb)   22 113 kg (250 lb)   22 112 kg (248 lb)   22 113 kg (250 lb)   21 111 kg (244 lb 6.4 oz)   10/29/21 108 kg (238 lb 6.4 oz)   21 117 kg (259 lb)   21 109 kg (241 lb)   21 110 kg (241 lb 9.6 oz)   21 117 kg (259 lb)   21 114 kg (252 lb)     Excess Weight:  Highest weight: 278  Lowest Weight: 220  Current weight: 264  Goal 230 (initial goal- 12.8% ) -220 (18.5%  What has been tried: Self created diets and exercise  Eliminated sugar and all liquid calories.     No longer dines out    Food Recall (taken by Renetta Gonzalez on 2023)  Breakfast: bagel or eggs or banana and cereal (Cheerios or wheat cereal)  Snack: apple, banana, or nuts  Lunch: skip or low sodium turkey sandwich or peanut butter and jelly or leftovers  Snack:   Dinner: fish or chicken, carbohydrate (brown rice or beans), vegetable   Snack:   (eats late)    Hunger/Cravings: struggles with portion sizes. (Especially if he doesn't eat enough during the day)  Dining out: No  Hydration: Water 80-110oz  Alcohol:  No  Smoking:  No  Exercise: None. 10k- 20k steps s day at work  Weight Monitoring:    Sleep: 7   STOP-BANG Score: 4/8   Occupation:   manager - high stress    Cardiology note on 1/12/2023:  Discussion/Summary: Overall he is doing well and has an excellent functional capacity with no symptoms. Coronary calcium score was personally reviewed with the patient and came back at 2. Agree with continuing statin LDL has dropped significantly and is currently less than 70. Blood pressure on manual recheck was 128/86. I recommend that he remain on hydrochlorothiazide. We talked about diet exercise and healthy lifestyle modifications. Continue to have salt reduction. No further cardiovascular testing we will see him on a yearly basis to reevaluate his risk. Past Medical History:   Diagnosis Date    Concussion     unsure of LOC- skiing accident 2/21/21    Wears contact lenses      Patient denies personal and family history of  pancreatitis, thyroid cancer, MEN-2 tumors. Denies any hx of glaucoma, seizures, kidney stones, gallstones. Denies Hx of CAD, PAD, palpitations, arrhythmia. Denies uncontrolled anxiety or depression, suicidal behavior or thinking , insomnia or sleep disturbance.    Past Surgical History:   Procedure Laterality Date    EGD      KNEE ARTHROSCOPY W/ ACL RECONSTRUCTION  03/2021    ORIF TIBIA & FIBULA FRACTURES Right     right ankle    MT ARTHRS AIDED ANT CRUCIATE LIGM RPR/AGMNTJ/RCNSTJ Left 3/8/2021    Procedure: ARTHROSCOPIC RECONSTRUCTION ANTERIOR CRUCIATE LIGAMENT (ACL) WITH AUTOGRAFT (patellar tendon); MENISCUS REPAIR;  Surgeon: Marce Faustin MD;  Location: [facility];  Service: Orthopedics    TIBIA FRACTURE SURGERY       The following portions of the patient's history were reviewed and updated as appropriate: allergies, current medications, past family history, past medical history, past social history, past surgical history, and problem list.    Review Of Systems:  Review of Systems   Constitutional:  Negative for activity change, appetite change, chills, fatigue and fever. HENT:  Negative for trouble swallowing. Respiratory:  Negative for cough and shortness of breath. Cardiovascular:  Negative for chest pain, palpitations and leg swelling. Gastrointestinal:  Negative for abdominal pain, constipation, diarrhea, nausea and vomiting. Endocrine: Negative for cold intolerance and heat intolerance. Genitourinary:  Negative for difficulty urinating and dysuria. Musculoskeletal:  Negative for arthralgias, back pain, gait problem and myalgias. Skin:  Negative for pallor and rash. Neurological:  Negative for headaches. Psychiatric/Behavioral:  Negative for dysphoric mood and suicidal ideas. The patient is not nervous/anxious. Objective:  /85   Pulse 99   Resp 16   Ht 5' 11" (1.803 m)   Wt 120 kg (264 lb 9.6 oz)   BMI 36.90 kg/m²   Physical Exam  Vitals and nursing note reviewed. Constitutional:       General: He is not in acute distress. Appearance: Normal appearance. He is not ill-appearing or diaphoretic. Eyes:      General: No scleral icterus. Neck:      Comments: Right thyroid mildly and diffusely enlarged compared to Left. Cardiovascular:      Rate and Rhythm: Normal rate and regular rhythm. Pulses: Normal pulses. Heart sounds: Normal heart sounds. No murmur heard. Pulmonary:      Effort: Pulmonary effort is normal. No respiratory distress. Breath sounds: Normal breath sounds. No stridor. No wheezing or rhonchi.    Abdominal:      General: Bowel sounds are normal. There is no distension. Palpations: Abdomen is soft. There is no mass. Tenderness: There is no abdominal tenderness. Musculoskeletal:      Cervical back: Neck supple. Right lower leg: No edema. Left lower leg: No edema. Lymphadenopathy:      Cervical: No cervical adenopathy. Skin:     Capillary Refill: Capillary refill takes less than 2 seconds. Findings: No lesion or rash. Neurological:      Mental Status: He is alert and oriented to person, place, and time. Gait: Gait normal.   Psychiatric:         Mood and Affect: Mood normal.         Behavior: Behavior normal.       Labs and Imaging  Recent labs and imaging have been personally reviewed.   Lab Results   Component Value Date    WBC 7.02 06/25/2021    HGB 16.0 06/25/2021    HCT 48.4 06/25/2021    MCV 81 (L) 06/25/2021     06/25/2021     Lab Results   Component Value Date    SODIUM 133 (L) 07/12/2023    K 3.4 (L) 07/12/2023     07/12/2023    CO2 27 07/12/2023    AGAP 4 07/12/2023    BUN 18 07/12/2023    CREATININE 1.10 07/12/2023    GLUF 129 (H) 07/12/2023    CALCIUM 9.4 07/12/2023    AST 29 07/12/2023    ALT 47 07/12/2023    ALKPHOS 93 07/12/2023    TP 8.9 (H) 07/12/2023    TBILI 0.36 07/12/2023    EGFR 82 07/12/2023     Lab Results   Component Value Date    HGBA1C 5.8 (H) 07/12/2023     No results found for: "[id number]", "TSH"  Lab Results   Component Value Date    CHOLESTEROL 230 (H) 07/12/2023     Lab Results   Component Value Date    HDL 46 07/12/2023     Lab Results   Component Value Date    TRIG 351 (H) 07/12/2023     Lab Results   Component Value Date    LDLCALC 114 (H) 07/12/2023

## 2023-12-15 ENCOUNTER — HOSPITAL ENCOUNTER (OUTPATIENT)
Dept: RADIOLOGY | Facility: HOSPITAL | Age: 43
Discharge: HOME | End: 2023-12-15
Attending: INTERNAL MEDICINE
Payer: COMMERCIAL

## 2023-12-15 DIAGNOSIS — E66.9 OBESITY (BMI 30-39.9): ICD-10-CM

## 2023-12-15 DIAGNOSIS — E01.0 THYROMEGALY: ICD-10-CM

## 2023-12-15 PROCEDURE — 76536 US EXAM OF HEAD AND NECK: CPT

## 2024-01-12 DIAGNOSIS — E78.5 HYPERLIPIDEMIA, UNSPECIFIED HYPERLIPIDEMIA TYPE: ICD-10-CM

## 2024-01-12 RX ORDER — ATORVASTATIN CALCIUM 10 MG/1
TABLET, FILM COATED ORAL
Qty: 90 TABLET | Refills: 1 | Status: SHIPPED | OUTPATIENT
Start: 2024-01-12

## 2024-01-22 ENCOUNTER — APPOINTMENT (OUTPATIENT)
Dept: LAB | Facility: CLINIC | Age: 44
End: 2024-01-22
Payer: COMMERCIAL

## 2024-01-22 DIAGNOSIS — K21.9 GASTROESOPHAGEAL REFLUX DISEASE, UNSPECIFIED WHETHER ESOPHAGITIS PRESENT: ICD-10-CM

## 2024-01-22 DIAGNOSIS — Z00.00 HEALTH CARE MAINTENANCE: ICD-10-CM

## 2024-01-22 DIAGNOSIS — E01.0 THYROMEGALY: ICD-10-CM

## 2024-01-22 DIAGNOSIS — E66.9 OBESITY (BMI 30-39.9): ICD-10-CM

## 2024-01-22 LAB
ALBUMIN SERPL BCP-MCNC: 4.3 G/DL (ref 3.5–5)
ALP SERPL-CCNC: 64 U/L (ref 34–104)
ALT SERPL W P-5'-P-CCNC: 39 U/L (ref 7–52)
ANION GAP SERPL CALCULATED.3IONS-SCNC: 11 MMOL/L
AST SERPL W P-5'-P-CCNC: 27 U/L (ref 13–39)
BASOPHILS # BLD AUTO: 0.04 THOUSANDS/ÂΜL (ref 0–0.1)
BASOPHILS NFR BLD AUTO: 1 % (ref 0–1)
BILIRUB SERPL-MCNC: 0.61 MG/DL (ref 0.2–1)
BUN SERPL-MCNC: 16 MG/DL (ref 5–25)
CALCIUM SERPL-MCNC: 9.3 MG/DL (ref 8.4–10.2)
CHLORIDE SERPL-SCNC: 98 MMOL/L (ref 96–108)
CHOLEST SERPL-MCNC: 134 MG/DL
CO2 SERPL-SCNC: 30 MMOL/L (ref 21–32)
CREAT SERPL-MCNC: 0.92 MG/DL (ref 0.6–1.3)
EOSINOPHIL # BLD AUTO: 0.16 THOUSAND/ÂΜL (ref 0–0.61)
EOSINOPHIL NFR BLD AUTO: 3 % (ref 0–6)
ERYTHROCYTE [DISTWIDTH] IN BLOOD BY AUTOMATED COUNT: 13 % (ref 11.6–15.1)
EST. AVERAGE GLUCOSE BLD GHB EST-MCNC: 131 MG/DL
GFR SERPL CREATININE-BSD FRML MDRD: 102 ML/MIN/1.73SQ M
GLUCOSE P FAST SERPL-MCNC: 101 MG/DL (ref 65–99)
HBA1C MFR BLD: 6.2 %
HCT VFR BLD AUTO: 43.8 % (ref 36.5–49.3)
HDLC SERPL-MCNC: 35 MG/DL
HGB BLD-MCNC: 14.4 G/DL (ref 12–17)
IMM GRANULOCYTES # BLD AUTO: 0.01 THOUSAND/UL (ref 0–0.2)
IMM GRANULOCYTES NFR BLD AUTO: 0 % (ref 0–2)
LDLC SERPL CALC-MCNC: 58 MG/DL (ref 0–100)
LYMPHOCYTES # BLD AUTO: 3.03 THOUSANDS/ÂΜL (ref 0.6–4.47)
LYMPHOCYTES NFR BLD AUTO: 49 % (ref 14–44)
MCH RBC QN AUTO: 26.4 PG (ref 26.8–34.3)
MCHC RBC AUTO-ENTMCNC: 32.9 G/DL (ref 31.4–37.4)
MCV RBC AUTO: 80 FL (ref 82–98)
MONOCYTES # BLD AUTO: 0.53 THOUSAND/ÂΜL (ref 0.17–1.22)
MONOCYTES NFR BLD AUTO: 9 % (ref 4–12)
NEUTROPHILS # BLD AUTO: 2.31 THOUSANDS/ÂΜL (ref 1.85–7.62)
NEUTS SEG NFR BLD AUTO: 38 % (ref 43–75)
NRBC BLD AUTO-RTO: 0 /100 WBCS
PLATELET # BLD AUTO: 338 THOUSANDS/UL (ref 149–390)
PMV BLD AUTO: 9.3 FL (ref 8.9–12.7)
POTASSIUM SERPL-SCNC: 3.3 MMOL/L (ref 3.5–5.3)
PROT SERPL-MCNC: 7.6 G/DL (ref 6.4–8.4)
RBC # BLD AUTO: 5.45 MILLION/UL (ref 3.88–5.62)
SODIUM SERPL-SCNC: 139 MMOL/L (ref 135–147)
TRIGL SERPL-MCNC: 205 MG/DL
TSH SERPL DL<=0.05 MIU/L-ACNC: 1.12 UIU/ML (ref 0.45–4.5)
WBC # BLD AUTO: 6.08 THOUSAND/UL (ref 4.31–10.16)

## 2024-01-22 PROCEDURE — 36415 COLL VENOUS BLD VENIPUNCTURE: CPT

## 2024-01-22 PROCEDURE — 80053 COMPREHEN METABOLIC PANEL: CPT

## 2024-01-22 PROCEDURE — 83036 HEMOGLOBIN GLYCOSYLATED A1C: CPT

## 2024-01-22 PROCEDURE — 80061 LIPID PANEL: CPT

## 2024-01-22 PROCEDURE — 84443 ASSAY THYROID STIM HORMONE: CPT

## 2024-01-22 PROCEDURE — 85025 COMPLETE CBC W/AUTO DIFF WBC: CPT

## 2024-01-25 ENCOUNTER — OFFICE VISIT (OUTPATIENT)
Dept: FAMILY MEDICINE CLINIC | Facility: CLINIC | Age: 44
End: 2024-01-25
Payer: COMMERCIAL

## 2024-01-25 VITALS
HEIGHT: 71 IN | SYSTOLIC BLOOD PRESSURE: 130 MMHG | WEIGHT: 272 LBS | DIASTOLIC BLOOD PRESSURE: 80 MMHG | BODY MASS INDEX: 38.08 KG/M2 | TEMPERATURE: 96.7 F | HEART RATE: 89 BPM | OXYGEN SATURATION: 99 %

## 2024-01-25 DIAGNOSIS — E66.9 OBESITY (BMI 30-39.9): ICD-10-CM

## 2024-01-25 DIAGNOSIS — R73.03 PREDIABETES: Primary | ICD-10-CM

## 2024-01-25 DIAGNOSIS — I10 PRIMARY HYPERTENSION: ICD-10-CM

## 2024-01-25 DIAGNOSIS — R13.19 ESOPHAGEAL DYSPHAGIA: ICD-10-CM

## 2024-01-25 DIAGNOSIS — E78.1 HYPERTRIGLYCERIDEMIA: ICD-10-CM

## 2024-01-25 PROCEDURE — 99214 OFFICE O/P EST MOD 30 MIN: CPT | Performed by: FAMILY MEDICINE

## 2024-01-25 NOTE — PROGRESS NOTES
"Chief Complaint   Patient presents with    Follow-up     6 month re check      Patient Instructions   Here for recheck and labs reviewed. Patient does have plan to lose weight. Reviewed labs and needs more exercise. Patient understands. Take BP med and cholesterol med as directed.   Assessment/Plan:    No problem-specific Assessment & Plan notes found for this encounter.       Diagnoses and all orders for this visit:    Prediabetes  -     Comprehensive metabolic panel; Future  -     Hemoglobin A1C; Future    Hypertriglyceridemia  -     Comprehensive metabolic panel; Future  -     Lipid Panel with Direct LDL reflex; Future    Primary hypertension  -     Comprehensive metabolic panel; Future    Esophageal dysphagia    Obesity (BMI 30-39.9)  -     Comprehensive metabolic panel; Future  -     Lipid Panel with Direct LDL reflex; Future  -     Hemoglobin A1C; Future          Subjective:      Patient ID: Andrews Oliver is a 43 y.o. male.    Follow-up (6 month re check ) here to review labs and triglyceridemia and has low hdl. Takes all meds as directed.         The following portions of the patient's history were reviewed and updated as appropriate: allergies, current medications, past family history, past medical history, past social history, past surgical history, and problem list.    Review of Systems   Constitutional: Negative.    HENT: Negative.     Eyes: Negative.    Respiratory: Negative.     Cardiovascular: Negative.    Gastrointestinal: Negative.    Endocrine: Negative.    Genitourinary: Negative.    Musculoskeletal: Negative.    Skin: Negative.    Allergic/Immunologic: Negative.    Neurological: Negative.    Hematological: Negative.    Psychiatric/Behavioral: Negative.           Objective:      /80   Pulse 89   Temp (!) 96.7 °F (35.9 °C) (Temporal)   Ht 5' 11\" (1.803 m)   Wt 123 kg (272 lb)   SpO2 99%   BMI 37.94 kg/m²          Physical Exam  Constitutional:       Appearance: He is well-developed. He is " obese.   HENT:      Head: Normocephalic and atraumatic.      Right Ear: External ear normal.      Left Ear: External ear normal.      Nose: Nose normal.   Eyes:      Conjunctiva/sclera: Conjunctivae normal.      Pupils: Pupils are equal, round, and reactive to light.   Cardiovascular:      Rate and Rhythm: Normal rate and regular rhythm.      Pulses: Normal pulses.      Heart sounds: Normal heart sounds.   Pulmonary:      Effort: Pulmonary effort is normal.      Breath sounds: Normal breath sounds.   Musculoskeletal:         General: Normal range of motion.      Cervical back: Normal range of motion and neck supple.   Skin:     General: Skin is warm and dry.      Capillary Refill: Capillary refill takes less than 2 seconds.   Neurological:      General: No focal deficit present.      Mental Status: He is alert and oriented to person, place, and time. Mental status is at baseline.      Deep Tendon Reflexes: Reflexes are normal and symmetric.   Psychiatric:         Mood and Affect: Mood normal.         Behavior: Behavior normal.         Thought Content: Thought content normal.         Judgment: Judgment normal.

## 2024-01-25 NOTE — PATIENT INSTRUCTIONS
Here for recheck and labs reviewed. Patient does have plan to lose weight. Reviewed labs and needs more exercise. Patient understands. Take BP med and cholesterol med as directed.

## 2024-02-26 ENCOUNTER — OFFICE VISIT (OUTPATIENT)
Dept: SLEEP CENTER | Facility: CLINIC | Age: 44
End: 2024-02-26
Payer: COMMERCIAL

## 2024-02-26 VITALS
HEIGHT: 71 IN | SYSTOLIC BLOOD PRESSURE: 130 MMHG | DIASTOLIC BLOOD PRESSURE: 90 MMHG | OXYGEN SATURATION: 99 % | HEART RATE: 83 BPM | BODY MASS INDEX: 38.36 KG/M2 | WEIGHT: 274 LBS

## 2024-02-26 DIAGNOSIS — E66.9 OBESITY (BMI 30-39.9): ICD-10-CM

## 2024-02-26 DIAGNOSIS — R06.89 GASPING FOR BREATH: ICD-10-CM

## 2024-02-26 DIAGNOSIS — I10 PRIMARY HYPERTENSION: ICD-10-CM

## 2024-02-26 DIAGNOSIS — R06.83 SNORING: Primary | ICD-10-CM

## 2024-02-26 PROCEDURE — 99204 OFFICE O/P NEW MOD 45 MIN: CPT | Performed by: INTERNAL MEDICINE

## 2024-02-26 NOTE — PATIENT INSTRUCTIONS
What is SAVANNA?   Obstructive sleep apnea is a common and serious sleep disorder that causes you to stop breathing during sleep. The airway repeatedly becomes blocked, limiting the amount of air that reaches your lungs. When this happens, you may snore loudly or making choking noises as you try to breathe. Your brain and body becomes oxygen deprived and you may wake up. This may happen a few times a night, or in more severe cases, several hundred times a night.   Sleep apnea can make you wake up in the morning feeling tired or unrefreshed even though you have had a full night of sleep. During the day, you may feel fatigued, have difficulty concentrating or you may even unintentionally fall asleep. This is because your body is waking up numerous times throughout the night, even though you might not be conscious of each awakening.  The lack of oxygen your body receives can have negative long-term consequences for your health. This includes:  High blood pressure  Heart disease  Irregular heart rhythms  Stroke  Pre-diabetes and diabetes  Depression  Testing  An objective evaluation of your sleep may be needed before your board certified sleep physician can make a diagnosis. Options include:   In-lab overnight sleep study  This type of sleep study requires you to stay overnight at a sleep center, in a bed that may resemble a hotel room. You will sleep with sensors hooked up to various parts of your body. These sensors record your brain waves, heartbeat, breathing and movement. An overnight sleep study also provides your doctor with the most complete information about your sleep. Learn more about an overnight sleep study.   Home sleep apnea test  Some patients with high risk factors for obstructive sleep apnea and no other medical disorders may be candidates for a home sleep apnea test. The testing equipment differs in that it is less complicated than what is used in an overnight sleep study. As such, does not give all the  data an in-lab will and if negative, may not mean you do not have the problem.  Treatment for sleep apnea includes using a continuous positive airway pressure (CPAP) machine to keep your airway open during sleep. A mask is placed over your nose and mouth, or just your nose. The mask is hooked to the CPAP machine that blows a gentle stream of air into the mask when you breathe. This helps keep your airway open so you can breathe more regularly. Extra oxygen may be given to you through the machine. You may be given a mouth device. It looks like a mouth guard or dental retainer and stops your tongue and mouth tissues from blocking your throat while you sleep. Surgery may be needed to remove extra tissues that block your mouth, throat, or nose.  Manage sleep apnea:   Do not smoke. Nicotine and other chemicals in cigarettes and cigars can cause lung damage. Ask your healthcare provider for information if you currently smoke and need help to quit. E-cigarettes or smokeless tobacco still contain nicotine. Talk to your healthcare provider before you use these products.  Do not drink alcohol or take sedative medicine before you go to sleep. Alcohol and sedatives can relax the muscles and tissues around your throat. This can block the airflow to your lungs.  Maintain a healthy weight. Excess tissue around your throat may restrict your breathing. Ask your healthcare provider for information if you need to lose weight.  Sleep on your side or use pillows designed to prevent sleep apnea. This prevents your tongue or other tissues from blocking your throat. You can also raise the head of your bed.  Driving Safety. Refrain from driving when drowsy.   Follow up with your healthcare provider as directed: Write down your questions so you remember to ask them during your visits.  Go to AASM website for more information: Sleepeducation.org  What is SAVANNA?   Obstructive sleep apnea is a common and serious sleep disorder that causes you to  stop breathing during sleep. The airway repeatedly becomes blocked, limiting the amount of air that reaches your lungs. When this happens, you may snore loudly or making choking noises as you try to breathe. Your brain and body becomes oxygen deprived and you may wake up. This may happen a few times a night, or in more severe cases, several hundred times a night.   Sleep apnea can make you wake up in the morning feeling tired or unrefreshed even though you have had a full night of sleep. During the day, you may feel fatigued, have difficulty concentrating or you may even unintentionally fall asleep. This is because your body is waking up numerous times throughout the night, even though you might not be conscious of each awakening.  The lack of oxygen your body receives can have negative long-term consequences for your health. This includes:  High blood pressure  Heart disease  Irregular heart rhythms  Stroke  Pre-diabetes and diabetes  Depression  Testing  An objective evaluation of your sleep may be needed before your board certified sleep physician can make a diagnosis. Options include:   In-lab overnight sleep study  This type of sleep study requires you to stay overnight at a sleep center, in a bed that may resemble a hotel room. You will sleep with sensors hooked up to various parts of your body. These sensors record your brain waves, heartbeat, breathing and movement. An overnight sleep study also provides your doctor with the most complete information about your sleep. Learn more about an overnight sleep study.   Home sleep apnea test  Some patients with high risk factors for obstructive sleep apnea and no other medical disorders may be candidates for a home sleep apnea test. The testing equipment differs in that it is less complicated than what is used in an overnight sleep study. As such, does not give all the data an in-lab will and if negative, may not mean you do not have the problem.  Treatment for  sleep apnea includes using a continuous positive airway pressure (CPAP) machine to keep your airway open during sleep. A mask is placed over your nose and mouth, or just your nose. The mask is hooked to the CPAP machine that blows a gentle stream of air into the mask when you breathe. This helps keep your airway open so you can breathe more regularly. Extra oxygen may be given to you through the machine. You may be given a mouth device. It looks like a mouth guard or dental retainer and stops your tongue and mouth tissues from blocking your throat while you sleep. Surgery may be needed to remove extra tissues that block your mouth, throat, or nose.  Manage sleep apnea:   Do not smoke. Nicotine and other chemicals in cigarettes and cigars can cause lung damage. Ask your healthcare provider for information if you currently smoke and need help to quit. E-cigarettes or smokeless tobacco still contain nicotine. Talk to your healthcare provider before you use these products.  Do not drink alcohol or take sedative medicine before you go to sleep. Alcohol and sedatives can relax the muscles and tissues around your throat. This can block the airflow to your lungs.  Maintain a healthy weight. Excess tissue around your throat may restrict your breathing. Ask your healthcare provider for information if you need to lose weight.  Sleep on your side or use pillows designed to prevent sleep apnea. This prevents your tongue or other tissues from blocking your throat. You can also raise the head of your bed.  Driving Safety. Refrain from driving when drowsy.   Follow up with your healthcare provider as directed: Write down your questions so you remember to ask them during your visits.  Go to AASM website for more information: Sleepeducation.org      Nursing Support:  When: Monday through Friday 7A-5PM except holidays  Where: Our direct line is [phone].    If you are having a true emergency please call 911.  In the event that the  line is busy or it is after hours please leave a voice message and we will return your call.  Please speak clearly, leaving your full name, birth date, best number to reach you and the reason for your call.   Medication refills: We will need the name of the medication, the dosage, the ordering provider, whether you get a 30 or 90 day refill, and the pharmacy name and address.  Medications will be ordered by the provider only.  Nurses cannot call in prescriptions.  Please allow 7 days for medication refills.  Physician requested updates: If your provider requested that you call with an update after starting medication, please be ready to provide us the medication and dosage, what time you take your medication, the time you attempt to fall asleep, time you fall asleep, when you wake up, and what time you get out of bed.  Sleep Study Results: We will contact you with sleep study results and/or next steps after the physician has reviewed your testing.

## 2024-02-26 NOTE — PROGRESS NOTES
" Consultation - Sleep Center   Andrews Oliver  44 y.o. male  :1980  MRN:[id number]  DOS:2024    Physician Requesting Consult: Yury Naylor             Reason for Consult : At your kind request I saw Andrews Oliver for initial sleep evaluation today. The patient is here to evaluate for suspected Obstructive Sleep Apnea.  Patient's presents with:.    PFSH, Problem List, Medications & Allergies were reviewed in EMR.    Andrews  has a past medical history of Concussion and Wears contact lenses.      He has a current medication list which includes the following prescription(s): vitamin c, atorvastatin, hydrochlorothiazide, ibuprofen, multivitamin, and famotidine.      HPI: He is in the weight management program and concern for sleep disordered breathing because of  wife's report of intermittent snoring ongoing for approximately a decade.  Snoring has escalated over time,  is worse when supine and on occasion she has observed he appears to be gasping for breath.  Andrews is aware of snoring or breathing difficulties during sleep but at times may awaken with sore throat.  Other complaints: None. Restless Leg Syndrome: reports no suggestive symptoms.    Parasomnia: reports teeth grinding during sleep in the past;   Sleep Routine (averaged): Typical Bedtime: 9-10 PM.  Gets OOB: 5-6 AM. TIB:>7 hrs.   Sleep latency:< 15 minutes; Sleep Interruptions: 1 to 2 times per night,  not always sure of the cause and usually able to fall back asleep, \"unless I have racing thoughts \".. Awakens: spontaneously  Upon awakening: usually feels refreshed.  He estimates getting 7 hrs sleep.  Daytime Function:Andrews denies daytime sleepiness. He rated himself at Total score: 1 /24 on the [place] Sleepiness Scale.     Habits:   reports that he has never smoked. He has never used smokeless tobacco.;  reports that he does not currently use alcohol.; Reports no history of drug use.;  E-Cigarette/Vaping    E-Cigarette Use  Never User; " "Caffeine use:limited; Exercise routine: regular in the past few weeks.    Occupation: Work Full Time   Family History: Suspects Brother has obstructive sleep apnea  ROS: Significant for approximately 30 pounds weight gain in the past year.  He reported no nasal, respiratory or cardiac symptoms.  He was experiencing acid reflux but recently has not been needing Pepcid..     EXAM:  /90 (BP Location: Left arm, Cuff Size: Adult)   Pulse 83   Ht 5' 11\" (1.803 m)   Wt 124 kg (274 lb)   SpO2 99%   BMI 38.22 kg/m²    General: Well groomed male, well appearing, in no apparent distress.   Neurological: Alert and orientated; cooperative; Cranial nerves intact;    Psychiatric: Speech: Clear and coherent; normal mood, affect & thought   Skin: Warm and dry; Color& Hydration good; no facial rashes or lesions   HEENT:  Craniofacial anatomy: normal Sinuses: Non-tender. TMJ: Normal    Eyes: EOM's intact; conjunctiva/corneas clear   Ears: Appear normal     Nasal Airway: is patent Septum: Intact; Turbinates: Normal; Rhinorrhea: None  Mouth: Lips: Normal posture; Dentition: normal . Mucosa: Moist; Hard Palate:normal    Oropharryx: crowded and AP narrowing Tongue: Mallampati:Class IV, Mobile, and MacroglossiaSoft Palate:  redundant  and Uvular Hypertrophy and bifid. Tonsils: present & normal  Neck: Is thick and muscular; neck Circumference: 19 \"; supple; no abnormal masses; Thyroid: Normal. Trachea: Central.    Lymph: No cervical or submandibular Lymhadenopathy  Heart: S1,S2 normal; RRR; no gallop; no murmur   Lungs: Respiratory Effort: Normal. Air entry good bilaterally.  No wheezes.  No rales  Abdomen: Obese, soft & non-tender    Extremities: No pedal edema.  No clubbing or cyanosis.    Musculoskeletal:  Motor normal; Gait: Normal.       Serial CMP have demonstrated CO2 levels 27 to 33 mmol/L, episodic marginally low potassium (likely related to his use of HCTZ), elevated fasting glucose and otherwise unremarkable.  CBC " "demonstrated normal hemoglobin and hematocrit but marginally low MCV and MCH    IMPRESSION: Primary/Secondary Sleep Diagnoses (to Medical or Psych conditions) & Comorbidities   1. Snoring  Home Study      2. Gasping for breath  Home Study      3. Obesity (BMI 30-39.9)  Ambulatory referral to Sleep Medicine      4. Primary hypertension             PLAN:   1. With respect to above conditions, comprehensive counseling provided on pathophysiology; effects on symptoms and comorbidities, diagnostic strategies & limitations; treatment options; risks or no treament; risks & benefits of the various therapeutic options; costs and insurance aspects.     2. I advised weight reduction, avoiding sleeping supine, using alcohol or sedating medications close to bed time and on safe driving practices.    3. Sleep testing is indicated, home sleep study will be scheduled and he understands the limitations.   4.  Patient may be a candidate for mandibular advancement device but is willing to try Positive airway pressure therapy and will be scheduled for a titration study if indicated.  5. Follow-up to be scheduled after testing initiation of therapy to review results, further details of treatment options and to adjust therapy.    Sincerely,      Authenticated electronically on 02/26/24   Board Certified Specialist     Portions of the record may have been created with voice recognition software. Occasional wrong word or \"sound a like\" substitutions may have occurred due to the inherent limitations of voice recognition software. There may also be notations and random deletions of words or characters from malfunctioning software. Read the chart carefully and recognize, using context, where substitutions/deletions have occurred.     "

## 2024-03-06 ENCOUNTER — OFFICE VISIT (OUTPATIENT)
Dept: BARIATRICS | Facility: CLINIC | Age: 44
End: 2024-03-06
Payer: COMMERCIAL

## 2024-03-06 VITALS
SYSTOLIC BLOOD PRESSURE: 150 MMHG | BODY MASS INDEX: 37.3 KG/M2 | RESPIRATION RATE: 16 BRPM | TEMPERATURE: 98.1 F | DIASTOLIC BLOOD PRESSURE: 90 MMHG | WEIGHT: 266.4 LBS | HEIGHT: 71 IN | HEART RATE: 100 BPM

## 2024-03-06 DIAGNOSIS — E78.2 MIXED HYPERLIPIDEMIA: ICD-10-CM

## 2024-03-06 DIAGNOSIS — I10 PRIMARY HYPERTENSION: ICD-10-CM

## 2024-03-06 DIAGNOSIS — R73.03 PREDIABETES: ICD-10-CM

## 2024-03-06 DIAGNOSIS — E66.9 OBESITY (BMI 30-39.9): Primary | ICD-10-CM

## 2024-03-06 PROCEDURE — 99214 OFFICE O/P EST MOD 30 MIN: CPT | Performed by: INTERNAL MEDICINE

## 2024-03-06 RX ORDER — TIRZEPATIDE 2.5 MG/.5ML
2.5 INJECTION, SOLUTION SUBCUTANEOUS WEEKLY
Qty: 2 ML | Refills: 0 | Status: SHIPPED | OUTPATIENT
Start: 2024-03-06 | End: 2024-03-12 | Stop reason: SDUPTHER

## 2024-03-06 NOTE — PATIENT INSTRUCTIONS
Highest weight: 278  Lowest Weight: 220  Current weight: 264  Goal 230 (initial goal - 12.8% ) -220 (18.5%)    - Patient is interested in pursuing Conservative Program  - Initial weight loss goal of 5-10% weight loss for improved health as studies have shown this is where we see the greatest impact on improving health and decreasing risk of obesity related conditions.  - Weight loss can improve patient's co-morbid conditions and/or prevent weight-related complications.  - Labs reviewed: As below.       General Recommendations:  Nutrition:  Eat breakfast daily.  Do not skip meals.      Food log (ie.) www.PhotoMania.com, sparkpeople.com, SWEEPiO.com, [employer].com, etc.     Practice mindful eating.  Be sure to set aside time to eat, eat slowly, and savor your food.     Hydration:    At least 64oz of water daily.  No sugar sweetened beverages.  No juice (eat the fruit instead).     Exercise:  Studies have shown that the ideal exercise goal is somewhere between 150 to 300 minutes of moderate intensity exercise a week.  Start with exercising 10 minutes every other day and gradually increase physical activity with a goal of at least 150 minutes of moderate intensity exercise a week, divided over at least 3 days a week.  An example of this would be exercising 30 minutes a day, 5 days a week.  Resistance training can increase muscle mass and increase our resting metabolic rate.   FULL BODY resistance training is recommended 2-3 times a week.  Do not do this on consecutive days to allow for muscle recovery.     Aim for a bare minimum 5000 steps, even on days you do not exercise.     Monitoring:   Weigh yourself daily.  If this causes undue stress, then just weigh yourself once a week.  Weigh yourself the same time of the day with the same amount of clothing on.  Preferably this should be done after waking up, before you eat, and with no clothing or minimal clothing on.     Specific Goals:  Food log (ie.)  www.myfitnesspal.com,Aura XM.com,loseit.com,Idea.me.com,etc.      Gradually increase physical activity to a goal of 5 days per week for 30 minutes of MODERATE intensity PLUS 2 days per week of FULL BODY resistance training     Small cysts on US of thyroid.  No further workup or evaluation is needed for this.     Calorie goal:  2232-7854 calories a day (Provided with meal plan to follow).     Consider meal planning package.     START ZEPBOUND  I have sent Zebound to your pharmacy. The prior authorization process will been done through our prior authorization team and can take up to 3 weeks to process through the insurance.      - Start Zepbound 2.5 mg subcutaneously injection weekly.  You will need a nurse visit in 4 weeks.  If you are doing well at that time the dose will be increased to Zepbound 5mg weekly.     - Side effects of Zepbound include nausea, vomiting, diarrhea, or constipation. Keep an eye on your heart rate while on Zepbound. If you resting heart rate is greater than 100 beats per minutes, please notify me. If you develop severe abdominal pain, stop Zepbound and go to the emergency room, as that could be a sign of pancreatitis.      - Please notify me if you have surgery, upper endoscopy, or colonoscopy scheduled, as we typically hold Zepbound for one week prior to the procedure.   - Zepbound can reduce the effectiveness of oral hormonal birth control (birth control pills). Recommend a barrier backup method such as condoms to prevent pregnancy.       Nurse visit in 1 month  Return visit:  3 month

## 2024-03-06 NOTE — PROGRESS NOTES
Assessment/Plan:  Andrews was seen today for follow-up.    Diagnoses and all orders for this visit:    Obesity (BMI 30-39.9)  -     tirzepatide (Zepbound) 2.5 mg/0.5 mL auto-injector; Inject 0.5 mL (2.5 mg total) under the skin once a week for 28 days    Prediabetes    Primary hypertension    Mixed hyperlipidemia      Highest weight: 278  Lowest Weight: 220  Current weight: 264  Goal 230 (initial goal - 12.8% ) -220 (18.5%)    - Patient is interested in pursuing Conservative Program  - Initial weight loss goal of 5-10% weight loss for improved health as studies have shown this is where we see the greatest impact on improving health and decreasing risk of obesity related conditions.  - Weight loss can improve patient's co-morbid conditions and/or prevent weight-related complications.  - Labs reviewed: As below.       General Recommendations:  Nutrition:  Eat breakfast daily.  Do not skip meals.      Food log (ie.) www.[employer].com, sparkpeople.com, loseit.com, calorieking.com, etc.     Practice mindful eating.  Be sure to set aside time to eat, eat slowly, and savor your food.     Hydration:    At least 64oz of water daily.  No sugar sweetened beverages.  No juice (eat the fruit instead).     Exercise:  Studies have shown that the ideal exercise goal is somewhere between 150 to 300 minutes of moderate intensity exercise a week.  Start with exercising 10 minutes every other day and gradually increase physical activity with a goal of at least 150 minutes of moderate intensity exercise a week, divided over at least 3 days a week.  An example of this would be exercising 30 minutes a day, 5 days a week.  Resistance training can increase muscle mass and increase our resting metabolic rate.   FULL BODY resistance training is recommended 2-3 times a week.  Do not do this on consecutive days to allow for muscle recovery.     Aim for a bare minimum 5000 steps, even on days you do not exercise.     Monitoring:   Weigh  yourself daily.  If this causes undue stress, then just weigh yourself once a week.  Weigh yourself the same time of the day with the same amount of clothing on.  Preferably this should be done after waking up, before you eat, and with no clothing or minimal clothing on.     Specific Goals:  Food log (ie.) www.[employer].com,GlamBox.com,ICEdotit.com,[employer]ff Technology.com,etc.      Gradually increase physical activity to a goal of 5 days per week for 30 minutes of MODERATE intensity PLUS 2 days per week of FULL BODY resistance training     Small cysts on US of thyroid.  No further workup or evaluation is needed for this.     Calorie goal:  1709-1321 calories a day (Provided with meal plan to follow).     Consider meal planning package.     START ZEPBOUND  I have sent Zebound to your pharmacy. The prior authorization process will been done through our prior authorization team and can take up to 3 weeks to process through the insurance.      - Start Zepbound 2.5 mg subcutaneously injection weekly.  You will need a nurse visit in 4 weeks.  If you are doing well at that time the dose will be increased to Zepbound 5mg weekly.     - Side effects of Zepbound include nausea, vomiting, diarrhea, or constipation. Keep an eye on your heart rate while on Zepbound. If you resting heart rate is greater than 100 beats per minutes, please notify me. If you develop severe abdominal pain, stop Zepbound and go to the emergency room, as that could be a sign of pancreatitis.      - Please notify me if you have surgery, upper endoscopy, or colonoscopy scheduled, as we typically hold Zepbound for one week prior to the procedure.   - Zepbound can reduce the effectiveness of oral hormonal birth control (birth control pills). Recommend a barrier backup method such as condoms to prevent pregnancy.       Nurse visit in 1 month  Return visit:  3 month    Total time spent reviewing chart, interviewing patient, examining patient, discussing plan,  answering all questions, and documentin min.       ______________________________________________________________________    Subjective:   Chief Complaint   Patient presents with    Follow-up     MWM- F/u     Patient here to discuss weight associated problems and nutrition goals  HPI: Andrews Oliver  is a 44 y.o. male with history of prediabetes, HLD, HTN and excess weight.  Weight loss plan:  Conservative Program.   Most recent notes and records were reviewed.    Wt Readings from Last 10 Encounters:   24 121 kg (266 lb 6.4 oz)   24 124 kg (274 lb)   24 123 kg (272 lb)   23 120 kg (264 lb 9.6 oz)   23 118 kg (260 lb)   03/10/23 118 kg (261 lb)   23 119 kg (261 lb 12.8 oz)   23 121 kg (267 lb)   23 116 kg (255 lb)   22 117 kg (259 lb)     Highest weight: 278  Lowest Weight: 220  Previous OV weight 264  Current weight: 266  Goal 230 (initial goal- 12.8% ) -220 (18.5%)    He has started tracking macros.  He is set his goal to 1500 felicia on weekdays and 1900 felicia on the weekends.  Carbohydrate goal 50 g a day.  He has been consuming approximately 100 to 120 g of protein a day.      Patient denies personal and family history of  pancreatitis, thyroid cancer, MEN-2 tumors.  Denies any hx of glaucoma, seizures, kidney stones, gallstones.  Denies Hx of CAD, PAD, palpitations, arrhythmia.   Denies uncontrolled anxiety or depression, suicidal ideation or behavior, insomnia or sleep disturbance.     The following portions of the patient's history were reviewed and updated as appropriate: allergies, current medications, past family history, past medical history, past social history, past surgical history, and problem list.    Review Of Systems:  Review of Systems   Constitutional:  Negative for activity change, appetite change, chills, fatigue and fever.   HENT:  Negative for trouble swallowing.    Respiratory:  Negative for cough and shortness of breath.   "  Cardiovascular:  Negative for chest pain, palpitations and leg swelling.   Gastrointestinal:  Negative for abdominal pain, constipation, diarrhea, nausea and vomiting.   Endocrine: Negative for cold intolerance and heat intolerance.   Genitourinary:  Negative for difficulty urinating and dysuria.   Musculoskeletal:  Negative for arthralgias, back pain, gait problem and myalgias.   Skin:  Negative for pallor and rash.   Neurological:  Negative for headaches.   Psychiatric/Behavioral:  Negative for dysphoric mood and suicidal ideas. The patient is not nervous/anxious.        Objective:  /90   Pulse 100   Temp 98.1 °F (36.7 °C)   Resp 16   Ht 5' 11\" (1.803 m)   Wt 121 kg (266 lb 6.4 oz)   BMI 37.16 kg/m²   Physical Exam  Constitutional:       Appearance: He is obese.   Pulmonary:      Effort: Pulmonary effort is normal. No respiratory distress.   Skin:     Coloration: Skin is not pale.      Findings: No erythema.   Neurological:      Mental Status: He is alert.   Psychiatric:         Mood and Affect: Mood normal.         Labs and Imaging  Recent labs and imaging have been personally reviewed.  Lab Results   Component Value Date    WBC 6.08 01/22/2024    HGB 14.4 01/22/2024    HCT 43.8 01/22/2024    MCV 80 (L) 01/22/2024     01/22/2024     Lab Results   Component Value Date    SODIUM 139 01/22/2024    K 3.3 (L) 01/22/2024    CL 98 01/22/2024    CO2 30 01/22/2024    AGAP 11 01/22/2024    BUN 16 01/22/2024    CREATININE 0.92 01/22/2024    GLUF 101 (H) 01/22/2024    CALCIUM 9.3 01/22/2024    AST 27 01/22/2024    ALT 39 01/22/2024    ALKPHOS 64 01/22/2024    TP 7.6 01/22/2024    TBILI 0.61 01/22/2024    EGFR 102 01/22/2024     Lab Results   Component Value Date    HGBA1C 6.2 (H) 01/22/2024     Lab Results   Component Value Date    XWN0HARVIIRH 1.123 01/22/2024     Lab Results   Component Value Date    CHOLESTEROL 134 01/22/2024     Lab Results   Component Value Date    HDL 35 (L) 01/22/2024     Lab " Results   Component Value Date    TRIG 205 (H) 01/22/2024     Lab Results   Component Value Date    LDLCALC 58 01/22/2024       Study Result  THYROID ULTRASOUND    INDICATION: E66.9: Obesity, unspecified  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    COMPARISON: None    TECHNIQUE: Ultrasound of the thyroid was performed with a high frequency linear transducer in transverse and sagittal planes including volumetric imaging sweeps as well as traditional still imaging technique.    FINDINGS: Normal homogeneous smooth echotexture.    Right lobe: 6.1 x 2.1 x 2.1 cm. Volume 13.3 mL  Left lobe: 5.1 x 1.7 x 2.3 cm. Volume 9.4 mL  Isthmus: 0.3 cm.    Scattered tiny colloid cyst are present. No solid nodules identified.    IMPRESSION:    Scattered tiny colloid cysts. No solid nodules identified.                Reference: ACR Thyroid Imaging, Reporting and Data System (TI-RADS): White Paper of the ACR TI-RADS Committee. J AM Edison Radiol 2017;14:587-595. (additional recommendations based on American Thyroid Association 2015 guidelines.)      Workstation performed: [id number]

## 2024-03-12 ENCOUNTER — TELEPHONE (OUTPATIENT)
Age: 44
End: 2024-03-12

## 2024-03-12 DIAGNOSIS — E66.9 OBESITY (BMI 30-39.9): ICD-10-CM

## 2024-03-12 RX ORDER — TIRZEPATIDE 2.5 MG/.5ML
2.5 INJECTION, SOLUTION SUBCUTANEOUS WEEKLY
Qty: 2 ML | Refills: 0 | Status: SHIPPED | OUTPATIENT
Start: 2024-03-12 | End: 2024-04-09

## 2024-03-12 NOTE — TELEPHONE ENCOUNTER
Patient's wife called in and wanted to speak to the office directly regarding her husbands medication denial and why it was not sent in under his secondary insurance. Patients wife requested to speak to the office regarding this matter. Unable to reach the clinical team at the office for a warm transfer. Advised caller that a message was sent on her behalf. Please call patient's wife back at [phone].

## 2024-03-12 NOTE — TELEPHONE ENCOUNTER
Patient called in to inform Dr. Ramirez that his insurance denied his medication for Zepbound. Patient is inquiring what his next steps would be. Please call the patient back at [phone] to advise. Patient states the denial was sent to the office. Patient also states the Zepbound is covered by his secondary insurance- [employer]. Patient is requesting if it is a possibility that it can be sent back to [facility] to be processed by his secondary insurance.

## 2024-03-13 ENCOUNTER — TELEPHONE (OUTPATIENT)
Age: 44
End: 2024-03-13

## 2024-03-13 ENCOUNTER — PATIENT OUTREACH (OUTPATIENT)
Dept: BARIATRICS | Facility: CLINIC | Age: 44
End: 2024-03-13

## 2024-03-13 ENCOUNTER — TELEPHONE (OUTPATIENT)
Dept: BARIATRICS | Facility: CLINIC | Age: 44
End: 2024-03-13

## 2024-03-13 NOTE — TELEPHONE ENCOUNTER
Patient's spouse called that a pre auth is required for the [facility] Zepbound.  She said that the pre auth was requested, I do not see that in the chart.  I told her I would forward the info over to the clinical dept. She is requesting someone from the office contact her at [phone]  I did let her know it may take up to 48 hours for a return call

## 2024-03-13 NOTE — PROGRESS NOTES
Patient came to office to receive information regarding his prior auth for zepbound. Confirmed with our team that  the prior authorization request was sent to the prior authorization team and that it can take 7-21 days for a decision.

## 2024-03-13 NOTE — TELEPHONE ENCOUNTER
PA for Zepbound Approved   Date(s) approved 3/12/24-3/12/25  Case # [id number]     Patient advised by [x] Pharm[employer] Message                      [] Phone call       Pharmacy advised by [x]Fax                                     []Phone call    Approval letter scanned into Media No Approval came through SS

## 2024-03-13 NOTE — TELEPHONE ENCOUNTER
Pt came in today and wanted to know status of his PA , did inform him takes 7-21 days and everythings is in the systems and will get done.

## 2024-03-13 NOTE — TELEPHONE ENCOUNTER
PA for Zepbound    Submitted via    []CMM-KEY    [x][employer]-Case ID # [id number]   []Faxed to plan   []Other website    []Phone call Case ID #      Office notes sent, clinical questions answered. Awaiting determination    Turnaround time for your insurance to make a decision on your Prior Authorization can take 7-21 business days.

## 2024-04-04 ENCOUNTER — HOSPITAL ENCOUNTER (OUTPATIENT)
Dept: SLEEP CENTER | Facility: CLINIC | Age: 44
Discharge: HOME | End: 2024-04-04
Payer: COMMERCIAL

## 2024-04-04 DIAGNOSIS — R06.89 GASPING FOR BREATH: ICD-10-CM

## 2024-04-04 DIAGNOSIS — R06.83 SNORING: ICD-10-CM

## 2024-04-04 PROCEDURE — G0399 HOME SLEEP TEST/TYPE 3 PORTA: HCPCS

## 2024-04-08 DIAGNOSIS — E66.2 OBESITY HYPOVENTILATION SYNDROME (HCC): Primary | ICD-10-CM

## 2024-04-08 DIAGNOSIS — G47.33 OSA (OBSTRUCTIVE SLEEP APNEA): ICD-10-CM

## 2024-04-09 DIAGNOSIS — E66.9 OBESITY (BMI 30-39.9): ICD-10-CM

## 2024-04-10 ENCOUNTER — TELEPHONE (OUTPATIENT)
Dept: SLEEP CENTER | Facility: CLINIC | Age: 44
End: 2024-04-10

## 2024-04-10 RX ORDER — TIRZEPATIDE 2.5 MG/.5ML
2.5 INJECTION, SOLUTION SUBCUTANEOUS WEEKLY
Qty: 2 ML | Refills: 0 | Status: CANCELLED | OUTPATIENT
Start: 2024-04-10 | End: 2024-05-08

## 2024-04-10 NOTE — TELEPHONE ENCOUNTER
Lmom answering pt's concerns with zepbound. Informing pt if he is doing well with the zepbound 2.5mg , then the provider can send in brooks increase of 5mg to the pharmacy. Also mention that he should schedule a nurse visit to check his weight and to see how he is feeling with the medication.

## 2024-04-10 NOTE — TELEPHONE ENCOUNTER
Home sleep study resulted, confirms moderate SAVANNA (DREW-13.7) with significant hypoxia.  CPAP titration study recommended.    Call placed to patient, advised of above.    Patient unable to schedule study at this time.  Will schedule at a later date.

## 2024-04-10 NOTE — TELEPHONE ENCOUNTER
Patient calling in stating that he had no issues with 2.5 mg  and asking for the increase to 5mg and to be sent to [facility] pharmacy

## 2024-04-11 RX ORDER — TIRZEPATIDE 5 MG/.5ML
5 INJECTION, SOLUTION SUBCUTANEOUS WEEKLY
Qty: 2 ML | Refills: 1 | Status: SHIPPED | OUTPATIENT
Start: 2024-04-11 | End: 2024-06-06

## 2024-04-12 ENCOUNTER — TELEPHONE (OUTPATIENT)
Dept: BARIATRICS | Facility: CLINIC | Age: 44
End: 2024-04-12

## 2024-04-15 ENCOUNTER — CLINICAL SUPPORT (OUTPATIENT)
Dept: BARIATRICS | Facility: CLINIC | Age: 44
End: 2024-04-15

## 2024-04-15 VITALS
HEART RATE: 102 BPM | WEIGHT: 255.6 LBS | SYSTOLIC BLOOD PRESSURE: 124 MMHG | DIASTOLIC BLOOD PRESSURE: 80 MMHG | HEIGHT: 71 IN | TEMPERATURE: 99.3 F | BODY MASS INDEX: 35.78 KG/M2

## 2024-04-15 DIAGNOSIS — R63.5 ABNORMAL WEIGHT GAIN: Primary | ICD-10-CM

## 2024-04-15 PROCEDURE — RECHECK

## 2024-04-15 NOTE — PROGRESS NOTES
Patient last visit weight:  266.4lb  Patient current visit weight:255.6lb    If you are taking phentermine or other oral weight loss medications, are you experiencing any of the following symptoms:  Headache:   Blurred Vision:   Chest Pain:   Palpitations:  Insomnia:   SPECIFY ORAL MEDICATION AND DOSAGE:     If you are taking an injectable medication,  are you experiencing any of the following symptoms:  Bloating:  no    Nausea:no  Vomiting: no  Constipation: no  Diarrhea: no  SPECIFY INJECTABLE MEDICATION AND CURRENT DOSAGE:   Injection  Zepbound 5mg.      Vitals:    Is BP less than 100/60? No   Is BP greater than 140/90? no  Is HR greater than 100? No   **If yes to any of the above, have patient relax and repeat in 5-10 minutes**    Repeat values:    Is BP less than 100/60?  Is BP greater than 140/90?  Is HR greater than 100?  **If values remain outside of ranges above, please consult provider for next steps**

## 2024-04-22 DIAGNOSIS — E66.9 OBESITY (BMI 30-39.9): Primary | ICD-10-CM

## 2024-04-23 RX ORDER — TIRZEPATIDE 5 MG/.5ML
5 INJECTION, SOLUTION SUBCUTANEOUS WEEKLY
Qty: 2 ML | Refills: 1 | Status: SHIPPED | OUTPATIENT
Start: 2024-04-23 | End: 2024-06-18

## 2024-05-22 ENCOUNTER — TELEPHONE (OUTPATIENT)
Age: 44
End: 2024-05-22

## 2024-05-22 DIAGNOSIS — E66.9 OBESITY (BMI 30-39.9): Primary | ICD-10-CM

## 2024-05-22 RX ORDER — TIRZEPATIDE 7.5 MG/.5ML
7.5 INJECTION, SOLUTION SUBCUTANEOUS WEEKLY
Qty: 2 ML | Refills: 0 | Status: SHIPPED | OUTPATIENT
Start: 2024-05-22 | End: 2024-06-19

## 2024-05-22 NOTE — TELEPHONE ENCOUNTER
Patient calling stating he spoke with his insurance and they advised him it wouldn't be a problem, so he wants to know if we can send in the 7.5 now for him. Please advise

## 2024-05-22 NOTE — TELEPHONE ENCOUNTER
Pt just picked up his Zepbound and wanted to know if we can call in the next does right away incase it's on back order.  I explained that his ins will not cover it if he just filed his current script.  Ins will deny coverage until he is closer to his renewal time.  I told pt that typically we tell pts to call when they have 2 injections left and then we are able to send the script to the pharmacy.  He states he is just going to wait to take the medication because he doesn't want to not be able to receive the next dose once he starts it

## 2024-06-07 ENCOUNTER — OFFICE VISIT (OUTPATIENT)
Dept: CARDIOLOGY CLINIC | Facility: CLINIC | Age: 44
End: 2024-06-07
Payer: COMMERCIAL

## 2024-06-07 VITALS
OXYGEN SATURATION: 98 % | DIASTOLIC BLOOD PRESSURE: 80 MMHG | HEART RATE: 95 BPM | HEIGHT: 73 IN | SYSTOLIC BLOOD PRESSURE: 132 MMHG | BODY MASS INDEX: 33.43 KG/M2 | WEIGHT: 252.2 LBS

## 2024-06-07 DIAGNOSIS — E78.2 MIXED HYPERLIPIDEMIA: ICD-10-CM

## 2024-06-07 DIAGNOSIS — E78.1 HYPERTRIGLYCERIDEMIA: ICD-10-CM

## 2024-06-07 DIAGNOSIS — I10 PRIMARY HYPERTENSION: Primary | ICD-10-CM

## 2024-06-07 DIAGNOSIS — G47.33 OSA (OBSTRUCTIVE SLEEP APNEA): ICD-10-CM

## 2024-06-07 PROCEDURE — 99214 OFFICE O/P EST MOD 30 MIN: CPT | Performed by: INTERNAL MEDICINE

## 2024-06-07 PROCEDURE — 93000 ELECTROCARDIOGRAM COMPLETE: CPT | Performed by: INTERNAL MEDICINE

## 2024-06-07 NOTE — PROGRESS NOTES
Andrews Oliver  1980  [id number]  [facility]  [address]  [address]  [facility] [address] [phone]    1. Primary hypertension  POCT ECG      2. SAVANNA (obstructive sleep apnea)        3. Mixed hyperlipidemia        4. Hypertriglyceridemia            Discussion/Summary: Overall from a cardiac standpoint he is doing well.  He is made great changes in his diet lipid profile significantly improved.  With ongoing exercise HDL should come up over the next couple of years.  Calcium score was 2.  LV function was preserved on echo.  Blood pressure on home checks has been well-controlled.  He is going to continue lose weight and follow-up with sleep medicine as he did have an abnormal home study in the past.  From my standpoint continue risk factor modification continue statin I will see him in a year.      Interval History:  42-year-old gentleman with a history of dyslipidemia and family history of premature coronary disease presents for new patient consultation on behalf of Dr. Hudson.  He is here today to talk about cardiovascular risk.  His brother recently had open heart surgery and bypass.  Functionally he has been doing great he is very physically active and has lost some weight over the past 6 months.  He has been trying to improve his dietary intake of cholesterol and also lower his blood pressure.  There has been no exertional chest pain, shortness of breath, palpitations, lightheadedness, dizziness, or syncope.  There has been no lower extremity edema, PND, orthopnea.  He is a lifelong nonsmoker.  Alcohol intake is minimal.  Caffeine intake is minimal.    Functional capacity is good he uses a static rowing machine remains very physically active denies any anginal sounding symptoms.  There is been no chest pain, shortness of breath, palpitations, lightheadedness, dizziness, or syncope.  There is been no lower extremity edema, PND, orthopnea.    Medical Problems                  Problem List       Esophageal dysphagia    Special screening examination for unspecified viral disease    Left ACL tear    Post-traumatic arthritis of right ankle    Abnormal ECG    Primary hypertension    Mixed hyperlipidemia                  Past Medical History:   Diagnosis Date    Concussion     unsure of LOC- skiing accident 2/21/21    Wears contact lenses      Social History     Socioeconomic History    Marital status: /Civil Union     Spouse name: Not on file    Number of children: Not on file    Years of education: Not on file    Highest education level: Not on file   Occupational History    Not on file   Tobacco Use    Smoking status: Never    Smokeless tobacco: Never   Vaping Use    Vaping status: Never Used   Substance and Sexual Activity    Alcohol use: Not Currently     Comment: socially    Drug use: Never    Sexual activity: Yes     Partners: Female   Other Topics Concern    Not on file   Social History Narrative    Not on file     Social Determinants of Health     Financial Resource Strain: Not on file   Food Insecurity: Not on file   Transportation Needs: Not on file   Physical Activity: Not on file   Stress: Not on file   Social Connections: Not on file   Intimate Partner Violence: Not on file   Housing Stability: Not on file      Family History   Problem Relation Age of Onset    Hypertension Mother     Hypertension Father     Heart attack Maternal Uncle      Past Surgical History:   Procedure Laterality Date    EGD      KNEE ARTHROSCOPY W/ ACL RECONSTRUCTION  03/2021    ORIF TIBIA & FIBULA FRACTURES Right     right ankle    ME ARTHRS AIDED ANT CRUCIATE LIGM RPR/AGMNTJ/RCNSTJ Left 3/8/2021    Procedure: ARTHROSCOPIC RECONSTRUCTION ANTERIOR CRUCIATE LIGAMENT (ACL) WITH AUTOGRAFT (patellar tendon); MENISCUS REPAIR;  Surgeon: Pratik Pozo MD;  Location: [facility];  Service: Orthopedics    TIBIA FRACTURE SURGERY         Current Outpatient Medications:     Ascorbic Acid  "(Vitamin C) 500 MG CAPS, Take 1 capsule by mouth daily, Disp: , Rfl:     atorvastatin (LIPITOR) 10 mg tablet, TAKE 1 TABLET BY MOUTH EVERY DAY, Disp: 90 tablet, Rfl: 1    Multiple Vitamin (multivitamin) tablet, Take 1 tablet by mouth daily, Disp: , Rfl:     QC Zinc 50 MG TABS, Take by mouth, Disp: , Rfl:     tirzepatide (Zepbound) 7.5 mg/0.5 mL auto-injector, Inject 0.5 mL (7.5 mg total) under the skin once a week for 28 days, Disp: 2 mL, Rfl: 0    hydrochlorothiazide (HYDRODIURIL) 25 mg tablet, TAKE 1 TABLET (25 MG TOTAL) BY MOUTH DAILY. (Patient not taking: Reported on 6/7/2024), Disp: 90 tablet, Rfl: 3    ibuprofen (MOTRIN) 800 mg tablet, TAKE 1 TABLET (800 MG TOTAL) BY MOUTH EVERY 8 (EIGHT) HOURS AS NEEDED FOR MILD PAIN (Patient not taking: Reported on 6/7/2024), Disp: 50 tablet, Rfl: 2  No Known Allergies    Labs:     Chemistry        Component Value Date/Time    K 3.3 (L) 01/22/2024 0733    CL 98 01/22/2024 0733    CO2 30 01/22/2024 0733    BUN 16 01/22/2024 0733    CREATININE 0.92 01/22/2024 0733        Component Value Date/Time    CALCIUM 9.3 01/22/2024 0733    ALKPHOS 64 01/22/2024 0733    AST 27 01/22/2024 0733    ALT 39 01/22/2024 0733            No results found for: \"CHOL\"  Lab Results   Component Value Date    HDL 35 (L) 01/22/2024    HDL 46 07/12/2023    HDL 48 02/07/2022     Lab Results   Component Value Date    LDLCALC 58 01/22/2024    LDLCALC 114 (H) 07/12/2023    LDLCALC 62 02/07/2022     Lab Results   Component Value Date    TRIG 205 (H) 01/22/2024    TRIG 351 (H) 07/12/2023    TRIG 167 (H) 02/07/2022     No results found for: \"CHOLHDL\"    Imaging: No results found.    ECG:  Normal sinus rhythm inferior Q-waves      Review of Systems   Constitutional: Negative.   HENT: Negative.     Eyes: Negative.    Cardiovascular: Negative.    Respiratory: Negative.     Endocrine: Negative.    Hematologic/Lymphatic: Negative.    Skin: Negative.    Musculoskeletal: Negative.    Gastrointestinal: Negative.  " "  Genitourinary: Negative.    Neurological: Negative.    Psychiatric/Behavioral: Negative.     All other systems reviewed and are negative.      Vitals:    06/07/24 1135   BP: 132/80   Pulse: 95   SpO2: 98%     Vitals:    06/07/24 1135   Weight: 114 kg (252 lb 3.2 oz)     Height: 6' 1\" (185.4 cm)   Body mass index is 33.27 kg/m².    Physical Exam:  Vital signs reviewed  General:  Alert and cooperative, appears stated age, no acute distress  HEENT:  PERRLA, EOMI, no scleral icterus, no conjunctival pallor  Neck:  No lymphadenopathy, no thyromegaly, no carotid bruits, no elevated JVP  Heart:  Regular rate and rhythm, normal S1/S2, no S3/S4, no murmur, rubs or gallops.  PMI nondisplaced  Lungs:  Clear to auscultation bilaterally, no wheezes rales or rhonchi  Abdomen:  Soft, non-tender, positive bowel sounds, no rebound or guarding,   no organomegaly   Extremities:  Normal range of motion.  No clubbing, cyanosis or edema   Vascular:  2+ pedal pulses  Skin:  No rashes or lesions on exposed skin  Neurologic:  Cranial nerves II-XII grossly intact without focal deficits  Psych:  Normal mood and affect        "

## 2024-07-07 DIAGNOSIS — E78.5 HYPERLIPIDEMIA, UNSPECIFIED HYPERLIPIDEMIA TYPE: ICD-10-CM

## 2024-07-07 DIAGNOSIS — I10 ESSENTIAL HYPERTENSION: ICD-10-CM

## 2024-07-07 RX ORDER — HYDROCHLOROTHIAZIDE 25 MG/1
25 TABLET ORAL DAILY
Qty: 90 TABLET | Refills: 1 | Status: SHIPPED | OUTPATIENT
Start: 2024-07-07

## 2024-07-07 RX ORDER — ATORVASTATIN CALCIUM 10 MG/1
TABLET, FILM COATED ORAL
Qty: 90 TABLET | Refills: 1 | Status: SHIPPED | OUTPATIENT
Start: 2024-07-07

## 2024-07-12 ENCOUNTER — OFFICE VISIT (OUTPATIENT)
Dept: BARIATRICS | Facility: CLINIC | Age: 44
End: 2024-07-12
Payer: COMMERCIAL

## 2024-07-12 VITALS
HEIGHT: 73 IN | SYSTOLIC BLOOD PRESSURE: 136 MMHG | RESPIRATION RATE: 16 BRPM | BODY MASS INDEX: 32.02 KG/M2 | TEMPERATURE: 96.9 F | HEART RATE: 92 BPM | WEIGHT: 241.6 LBS | DIASTOLIC BLOOD PRESSURE: 80 MMHG

## 2024-07-12 DIAGNOSIS — G47.33 OSA (OBSTRUCTIVE SLEEP APNEA): ICD-10-CM

## 2024-07-12 DIAGNOSIS — I10 PRIMARY HYPERTENSION: ICD-10-CM

## 2024-07-12 DIAGNOSIS — R73.03 PREDIABETES: ICD-10-CM

## 2024-07-12 DIAGNOSIS — E66.9 OBESITY (BMI 30-39.9): Primary | ICD-10-CM

## 2024-07-12 DIAGNOSIS — E78.2 MIXED HYPERLIPIDEMIA: ICD-10-CM

## 2024-07-12 PROCEDURE — 99213 OFFICE O/P EST LOW 20 MIN: CPT | Performed by: INTERNAL MEDICINE

## 2024-07-12 RX ORDER — TIRZEPATIDE 10 MG/.5ML
10 INJECTION, SOLUTION SUBCUTANEOUS WEEKLY
Qty: 2 ML | Refills: 0 | Status: SHIPPED | OUTPATIENT
Start: 2024-07-12 | End: 2024-07-17 | Stop reason: SDUPTHER

## 2024-07-12 NOTE — PATIENT INSTRUCTIONS
Highest weight: 278  Lowest Weight: 220  Previous office visit weight: 264  Current weight: 241  Change -23 lbs (-8.7%)  Goal 230 (initial goal - 12.8% ) -220 (18.5%)    General Recommendations:  Nutrition:  Eat breakfast daily.  Do not skip meals.     Food log (ie.) www.myfitnesspal.com, sparkpeople.com, loseit.com, calorieking.com, etc.    Practice mindful eating.  Be sure to set aside time to eat, eat slowly, and savor your food.    Hydration:    At least 64oz of water daily.  No sugar sweetened beverages.  No juice (eat the fruit instead).    Exercise:  Studies have shown that the ideal exercise goal is somewhere between 150 to 300 minutes of moderate intensity exercise a week.  Start with exercising 10 minutes every other day and gradually increase physical activity with a goal of at least 150 minutes of moderate intensity exercise a week, divided over at least 3 days a week.  An example of this would be exercising 30 minutes a day, 5 days a week.  Resistance training can increase muscle mass and increase our resting metabolic rate.   FULL BODY resistance training is recommended 2-3 times a week.  Do not do this on consecutive days to allow for muscle recovery.    Aim for a bare minimum 5000 steps, even on days you do not exercise.    Monitoring:   Weigh yourself daily.  If this causes undue stress, then just weigh yourself once a week.  Weigh yourself the same time of the day with the same amount of clothing on.  Preferably this should be done after waking up, before you eat, and with no clothing or minimal clothing on.    Specific Goals:  Food log (ie.) www.myfitnesspal.com,sparkpeople.com,loseit.com,calorieking.com,etc.     No sugary beverages. At least 64oz of water daily.    Increase physical activity by 10 minutes daily    Continue with Zepbound 5mg weekly.  Increase to 5mg weekly next month.  Nurse visit in 6 weeks.    Follow-up in 4 months

## 2024-07-12 NOTE — PROGRESS NOTES
Assessment/Plan:  Andrews was seen today for follow-up.    Diagnoses and all orders for this visit:    Obesity (BMI 30-39.9)  -     tirzepatide (Zepbound) 10 mg/0.5 mL auto-injector; Inject 0.5 mL (10 mg total) under the skin once a week for 28 days    Mixed hyperlipidemia    Prediabetes    SAVANNA (obstructive sleep apnea)    Primary hypertension       Highest weight: 278  Lowest Weight: 220  Previous office visit weight: 264  Current weight: 241  Change -23 lbs (-8.7%)  Goal 230 (initial goal - 12.8% ) -220 (18.5%)    - Weight not at goal  - Patient is interested in Conservative Program  - Labs reviewed: As below.    General Recommendations:  Nutrition:  Eat breakfast daily.  Do not skip meals.     Food log (ie.) www.SunnyBump.com, sparkpeople.com, loseit.com, calorieking.com, etc.    Practice mindful eating.  Be sure to set aside time to eat, eat slowly, and savor your food.    Hydration:    At least 64oz of water daily.  No sugar sweetened beverages.  No juice (eat the fruit instead).    Exercise:  Studies have shown that the ideal exercise goal is somewhere between 150 to 300 minutes of moderate intensity exercise a week.  Start with exercising 10 minutes every other day and gradually increase physical activity with a goal of at least 150 minutes of moderate intensity exercise a week, divided over at least 3 days a week.  An example of this would be exercising 30 minutes a day, 5 days a week.  Resistance training can increase muscle mass and increase our resting metabolic rate.   FULL BODY resistance training is recommended 2-3 times a week.  Do not do this on consecutive days to allow for muscle recovery.    Aim for a bare minimum 5000 steps, even on days you do not exercise.    Monitoring:   Weigh yourself daily.  If this causes undue stress, then just weigh yourself once a week.  Weigh yourself the same time of the day with the same amount of clothing on.  Preferably this should be done after waking up,  before you eat, and with no clothing or minimal clothing on.    Specific Goals:  Food log (ie.) www.[employer].com,Gorb.com,loseit.com,ZoomInfo.com,etc.     No sugary beverages. At least 64oz of water daily.    Increase physical activity by 10 minutes daily    Continue with Zepbound 5mg weekly.  Increase to 5mg weekly next month.  Nurse visit in 6 weeks.    Follow-up in 4 months    Total time spent reviewing chart, interviewing patient, examining patient, discussing plan, answering all questions, and documentin min.       ______________________________________________________________________    Subjective:   Chief Complaint   Patient presents with    Follow-up     MWM- 3 mo F/u; Waist in     Patient here to discuss weight associated problems and nutrition goals  HPI: Andrews Oliver  is a 44 y.o. male with history of prediabetes, HTN, mixed lipidemia, GERD, and excess weight.  Weight loss plan:  Conservative Program.   Most recent notes and records were reviewed.    Wt Readings from Last 10 Encounters:   24 110 kg (241 lb 9.6 oz)   24 114 kg (252 lb 3.2 oz)   04/15/24 116 kg (255 lb 9.6 oz)   24 121 kg (266 lb 6.4 oz)   24 124 kg (274 lb)   24 123 kg (272 lb)   23 120 kg (264 lb 9.6 oz)   23 118 kg (260 lb)   03/10/23 118 kg (261 lb)   23 119 kg (261 lb 12.8 oz)     Highest weight: 278  Lowest Weight: 220  Previous office visit weight: 264  Current weight: 241  Change -23 lbs (-8.7%)  Goal 230 (initial goal - 12.8% ) -220 (18.5%)    Had to disrupt Zepbound due to supply chain issues.  Currently on dose 3 of the 5mg dose.  He has the 7.5mg dosage in stock.      Hunger/Cravings:  Reduced.  Hydration: Adequate  Alcohol:  No  Exercise:  Yes  Weight Monitoring: Yes      Patient denies personal and family history of  pancreatitis, thyroid cancer, MEN-2 tumors.    Review Of Systems:  Review of Systems   Constitutional:  Negative for activity change,  "appetite change, chills, fatigue and fever.   HENT:  Negative for trouble swallowing.    Respiratory:  Negative for cough and shortness of breath.    Cardiovascular:  Negative for chest pain, palpitations and leg swelling.   Gastrointestinal:  Negative for abdominal pain, constipation, diarrhea, nausea and vomiting.   Endocrine: Negative for cold intolerance and heat intolerance.   Genitourinary:  Negative for difficulty urinating and dysuria.   Musculoskeletal:  Negative for arthralgias, back pain, gait problem and myalgias.   Skin:  Negative for pallor and rash.   Neurological:  Negative for headaches.   Psychiatric/Behavioral:  Negative for dysphoric mood and suicidal ideas. The patient is not nervous/anxious.        Objective:  /80   Pulse 92   Temp (!) 96.9 °F (36.1 °C)   Resp 16   Ht 6' 1\" (1.854 m)   Wt 110 kg (241 lb 9.6 oz)   BMI 31.88 kg/m²   Physical Exam  Vitals and nursing note reviewed.   Constitutional:       General: He is not in acute distress.     Appearance: Normal appearance. He is not ill-appearing or diaphoretic.   Eyes:      General: No scleral icterus.  Cardiovascular:      Rate and Rhythm: Normal rate and regular rhythm.      Pulses: Normal pulses.      Heart sounds: Normal heart sounds. No murmur heard.  Pulmonary:      Effort: Pulmonary effort is normal. No respiratory distress.      Breath sounds: Normal breath sounds. No stridor. No wheezing or rhonchi.   Musculoskeletal:      Cervical back: Neck supple.      Right lower leg: No edema.      Left lower leg: No edema.   Lymphadenopathy:      Cervical: No cervical adenopathy.   Skin:     Capillary Refill: Capillary refill takes less than 2 seconds.      Findings: No lesion or rash.   Neurological:      Mental Status: He is alert and oriented to person, place, and time.      Gait: Gait normal.   Psychiatric:         Mood and Affect: Mood normal.         Behavior: Behavior normal.       Labs and Imaging  Recent labs and imaging " have been personally reviewed.  Lab Results   Component Value Date    WBC 6.08 01/22/2024    HGB 14.4 01/22/2024    HCT 43.8 01/22/2024    MCV 80 (L) 01/22/2024     01/22/2024     Lab Results   Component Value Date    SODIUM 139 01/22/2024    K 3.3 (L) 01/22/2024    CL 98 01/22/2024    CO2 30 01/22/2024    AGAP 11 01/22/2024    BUN 16 01/22/2024    CREATININE 0.92 01/22/2024    GLUF 101 (H) 01/22/2024    CALCIUM 9.3 01/22/2024    AST 27 01/22/2024    ALT 39 01/22/2024    ALKPHOS 64 01/22/2024    TP 7.6 01/22/2024    TBILI 0.61 01/22/2024    EGFR 102 01/22/2024     Lab Results   Component Value Date    HGBA1C 6.2 (H) 01/22/2024     Lab Results   Component Value Date    CCC4DVOYEFGL 1.123 01/22/2024     Lab Results   Component Value Date    CHOLESTEROL 134 01/22/2024     Lab Results   Component Value Date    HDL 35 (L) 01/22/2024     Lab Results   Component Value Date    TRIG 205 (H) 01/22/2024     Lab Results   Component Value Date    LDLCALC 58 01/22/2024

## 2024-07-16 ENCOUNTER — TELEPHONE (OUTPATIENT)
Age: 44
End: 2024-07-16

## 2024-07-16 NOTE — TELEPHONE ENCOUNTER
Pt called in to request a transfer for Zepbound 10 mg script. Please, send script to [facility] [place].

## 2024-07-17 DIAGNOSIS — E66.9 OBESITY (BMI 30-39.9): ICD-10-CM

## 2024-07-17 RX ORDER — TIRZEPATIDE 10 MG/.5ML
10 INJECTION, SOLUTION SUBCUTANEOUS WEEKLY
Qty: 2 ML | Refills: 1 | Status: SHIPPED | OUTPATIENT
Start: 2024-07-17 | End: 2024-09-11

## 2024-07-18 ENCOUNTER — APPOINTMENT (OUTPATIENT)
Dept: LAB | Facility: CLINIC | Age: 44
End: 2024-07-18
Payer: COMMERCIAL

## 2024-07-18 DIAGNOSIS — R73.03 PREDIABETES: ICD-10-CM

## 2024-07-18 DIAGNOSIS — E78.1 HYPERTRIGLYCERIDEMIA: ICD-10-CM

## 2024-07-18 DIAGNOSIS — E66.9 OBESITY (BMI 30-39.9): ICD-10-CM

## 2024-07-18 DIAGNOSIS — I10 PRIMARY HYPERTENSION: ICD-10-CM

## 2024-07-18 LAB
ALBUMIN SERPL BCG-MCNC: 4.8 G/DL (ref 3.5–5)
ALP SERPL-CCNC: 65 U/L (ref 34–104)
ALT SERPL W P-5'-P-CCNC: 30 U/L (ref 7–52)
ANION GAP SERPL CALCULATED.3IONS-SCNC: 9 MMOL/L (ref 4–13)
AST SERPL W P-5'-P-CCNC: 19 U/L (ref 13–39)
BILIRUB SERPL-MCNC: 0.6 MG/DL (ref 0.2–1)
BUN SERPL-MCNC: 22 MG/DL (ref 5–25)
CALCIUM SERPL-MCNC: 9.8 MG/DL (ref 8.4–10.2)
CHLORIDE SERPL-SCNC: 97 MMOL/L (ref 96–108)
CHOLEST SERPL-MCNC: 103 MG/DL
CO2 SERPL-SCNC: 29 MMOL/L (ref 21–32)
CREAT SERPL-MCNC: 1.08 MG/DL (ref 0.6–1.3)
EST. AVERAGE GLUCOSE BLD GHB EST-MCNC: 126 MG/DL
GFR SERPL CREATININE-BSD FRML MDRD: 83 ML/MIN/1.73SQ M
GLUCOSE P FAST SERPL-MCNC: 101 MG/DL (ref 65–99)
HBA1C MFR BLD: 6 %
HDLC SERPL-MCNC: 37 MG/DL
LDLC SERPL CALC-MCNC: 49 MG/DL (ref 0–100)
POTASSIUM SERPL-SCNC: 3.3 MMOL/L (ref 3.5–5.3)
PROT SERPL-MCNC: 8.3 G/DL (ref 6.4–8.4)
SODIUM SERPL-SCNC: 135 MMOL/L (ref 135–147)
TRIGL SERPL-MCNC: 87 MG/DL

## 2024-07-18 PROCEDURE — 80061 LIPID PANEL: CPT

## 2024-07-18 PROCEDURE — 83036 HEMOGLOBIN GLYCOSYLATED A1C: CPT

## 2024-07-18 PROCEDURE — 80053 COMPREHEN METABOLIC PANEL: CPT

## 2024-07-18 PROCEDURE — 36415 COLL VENOUS BLD VENIPUNCTURE: CPT

## 2024-07-19 ENCOUNTER — TELEPHONE (OUTPATIENT)
Dept: BARIATRICS | Facility: CLINIC | Age: 44
End: 2024-07-19

## 2024-07-19 NOTE — TELEPHONE ENCOUNTER
PA for Zepbound 10 mg Approved     Date(s) approved 3/12/25      Patient advised by          [] MyChart Message  [x] Phone call   []LMOM  []L/M to call office as no active Communication consent on file  []Unable to leave detailed message as VM not approved on Communication consent       Pharmacy advised by    []Fax  []Phone call    Approval letter scanned into Media Yes

## 2024-07-19 NOTE — TELEPHONE ENCOUNTER
PA for Zepbound 10 mg     Submitted via    [x]CMM-KEY [id number]  []Surescripts-Case ID #    []Faxed to plan   []Other website    []Phone call Case ID #      Office notes sent, clinical questions answered. Awaiting determination    Turnaround time for your insurance to make a decision on your Prior Authorization can take 7-21 business days.

## 2024-07-22 ENCOUNTER — TELEPHONE (OUTPATIENT)
Dept: FAMILY MEDICINE CLINIC | Facility: CLINIC | Age: 44
End: 2024-07-22

## 2024-07-22 NOTE — TELEPHONE ENCOUNTER
----- Message from Cheng Hudson DO sent at 7/21/2024  9:24 PM EDT -----  Please call the patient regarding his abnormal result. Rec potassium in diet and rec low sugar diet and exercise to increase hdl and lower blood sugar.

## 2024-08-01 ENCOUNTER — OFFICE VISIT (OUTPATIENT)
Dept: FAMILY MEDICINE CLINIC | Facility: CLINIC | Age: 44
End: 2024-08-01
Payer: COMMERCIAL

## 2024-08-01 VITALS
RESPIRATION RATE: 16 BRPM | BODY MASS INDEX: 32.48 KG/M2 | HEIGHT: 71 IN | WEIGHT: 232 LBS | HEART RATE: 95 BPM | OXYGEN SATURATION: 98 % | SYSTOLIC BLOOD PRESSURE: 122 MMHG | DIASTOLIC BLOOD PRESSURE: 78 MMHG | TEMPERATURE: 96.2 F

## 2024-08-01 DIAGNOSIS — E78.2 MIXED HYPERLIPIDEMIA: ICD-10-CM

## 2024-08-01 DIAGNOSIS — E78.6 LOW HDL (UNDER 40): ICD-10-CM

## 2024-08-01 DIAGNOSIS — R73.03 PREDIABETES: ICD-10-CM

## 2024-08-01 DIAGNOSIS — I10 PRIMARY HYPERTENSION: ICD-10-CM

## 2024-08-01 DIAGNOSIS — E66.9 OBESITY (BMI 30-39.9): ICD-10-CM

## 2024-08-01 DIAGNOSIS — Z00.00 HEALTH CARE MAINTENANCE: Primary | ICD-10-CM

## 2024-08-01 PROCEDURE — 3725F SCREEN DEPRESSION PERFORMED: CPT | Performed by: FAMILY MEDICINE

## 2024-08-01 PROCEDURE — 3074F SYST BP LT 130 MM HG: CPT | Performed by: FAMILY MEDICINE

## 2024-08-01 PROCEDURE — 99396 PREV VISIT EST AGE 40-64: CPT | Performed by: FAMILY MEDICINE

## 2024-08-01 PROCEDURE — 3078F DIAST BP <80 MM HG: CPT | Performed by: FAMILY MEDICINE

## 2024-08-01 RX ORDER — CIPROFLOXACIN AND DEXAMETHASONE 3; 1 MG/ML; MG/ML
SUSPENSION/ DROPS AURICULAR (OTIC)
COMMUNITY
Start: 2024-07-26

## 2024-08-01 NOTE — PROGRESS NOTES
Ambulatory Visit  Name: Andrews Oliver      : 1980      MRN: [id number]  Encounter Provider: Cheng Hudson DO  Encounter Date: 2024   Encounter department: [facility]  Chief Complaint   Patient presents with    Well Check     Patient Instructions   Here for General PE and needs low sugar diet and needs to exercise to increase hdl. Saw Cardiology in past. HTN stable with BP med and losing weight has helped and also cholesterol much improved and will continue to monitor and take statin. Use sunscreen and monitor for ticks. Get at least 8 hours sleep per night and at least 150 minutes of exercise per week. Snoring much better and has resolved after losing weight.       Assessment & Plan   1. Health care maintenance  2. Prediabetes  Comments:  low sugar diet and exercise encouraged.  Orders:  -     Comprehensive metabolic panel; Future; Expected date: 2025  -     Hemoglobin A1C; Future; Expected date: 2025  3. Obesity (BMI 30-39.9)  Comments:  low sugar diet, exercise encouraged  4. Low HDL (under 40)  Comments:  exercise  Orders:  -     Comprehensive metabolic panel; Future; Expected date: 2025  -     Lipid Panel with Direct LDL reflex; Future; Expected date: 2025  5. Primary hypertension  Comments:  stable  Orders:  -     Comprehensive metabolic panel; Future; Expected date: 2025  6. Mixed hyperlipidemia  Comments:  exercise to increase hdl  Orders:  -     Comprehensive metabolic panel; Future; Expected date: 2025  -     Lipid Panel with Direct LDL reflex; Future; Expected date: 2025    [unfilled]  History of Present Illness     Well Check sleeps 8 hours per night and exercises at least 150 minutes exercise per week. Patient tries to eat healthy. Patient non smoker and does not drink. 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 Patient  with 2 children turning 2 and and turning 7 girls. Lost almost  "30 pounds less than last summer.         Review of Systems   Constitutional: Negative.    HENT: Negative.     Eyes: Negative.    Respiratory: Negative.     Cardiovascular: Negative.    Gastrointestinal: Negative.    Endocrine: Negative.    Genitourinary: Negative.    Musculoskeletal: Negative.    Skin: Negative.    Allergic/Immunologic: Negative.    Neurological: Negative.    Hematological: Negative.    Psychiatric/Behavioral: Negative.       Current Outpatient Medications on File Prior to Visit   Medication Sig Dispense Refill    Ascorbic Acid (Vitamin C) 500 MG CAPS Take 1 capsule by mouth daily      atorvastatin (LIPITOR) 10 mg tablet TAKE 1 TABLET BY MOUTH EVERY DAY 90 tablet 1    ciprofloxacin-dexamethasone (CIPRODEX) otic suspension INSTILL 4 DROPS IN EACH AFFECTED EAR FOR 7 DAYS      hydroCHLOROthiazide 25 mg tablet TAKE 1 TABLET (25 MG TOTAL) BY MOUTH DAILY. 90 tablet 1    Multiple Vitamin (multivitamin) tablet Take 1 tablet by mouth daily      Omega-3 Fatty Acids (OMEGA 3 PO) Take 1 capsule by mouth in the morning      QC Zinc 50 MG TABS Take by mouth      tirzepatide (Zepbound) 10 mg/0.5 mL auto-injector Inject 0.5 mL (10 mg total) under the skin once a week 2 mL 1    VITAMIN D, CHOLECALCIFEROL, PO Take by mouth       No current facility-administered medications on file prior to visit.      Objective     /78   Pulse 95   Temp (!) 96.2 °F (35.7 °C) (Temporal)   Resp 16   Ht 5' 11.26\" (1.81 m)   Wt 105 kg (232 lb)   SpO2 98%   BMI 32.12 kg/m²     Physical Exam  Constitutional:       Appearance: He is well-developed. He is obese.   HENT:      Head: Normocephalic and atraumatic.      Right Ear: External ear normal.      Left Ear: External ear normal.      Nose: Nose normal.      Mouth/Throat:      Mouth: Mucous membranes are moist.   Eyes:      Conjunctiva/sclera: Conjunctivae normal.      Pupils: Pupils are equal, round, and reactive to light.   Cardiovascular:      Rate and Rhythm: Normal rate " and regular rhythm.      Pulses: Normal pulses.      Heart sounds: Normal heart sounds.   Pulmonary:      Effort: Pulmonary effort is normal.      Breath sounds: Normal breath sounds.   Abdominal:      General: Abdomen is flat. Bowel sounds are normal.      Palpations: Abdomen is soft.   Genitourinary:     Penis: Normal.       Testes: Normal.   Musculoskeletal:         General: Normal range of motion.      Cervical back: Normal range of motion and neck supple.   Skin:     General: Skin is warm and dry.      Capillary Refill: Capillary refill takes less than 2 seconds.   Neurological:      General: No focal deficit present.      Mental Status: He is alert and oriented to person, place, and time. Mental status is at baseline.      Deep Tendon Reflexes: Reflexes are normal and symmetric.   Psychiatric:         Mood and Affect: Mood normal.         Behavior: Behavior normal.         Thought Content: Thought content normal.         Judgment: Judgment normal.       Administrative Statements   I have spent a total time of 30 minutes in caring for this patient on the day of the visit/encounter including Diagnostic results, Prognosis, Risks and benefits of tx options, Instructions for management, Patient and family education, Importance of tx compliance, Risk factor reductions, Impressions, Counseling / Coordination of care, Documenting in the medical record, Reviewing / ordering tests, medicine, procedures  , and Obtaining or reviewing history  .

## 2024-08-01 NOTE — PATIENT INSTRUCTIONS
Here for General PE and needs low sugar diet and needs to exercise to increase hdl. Saw Cardiology in past. HTN stable with BP med and losing weight has helped and also cholesterol much improved and will continue to monitor and take statin. Use sunscreen and monitor for ticks. Get at least 8 hours sleep per night and at least 150 minutes of exercise per week. Snoring much better and has resolved after losing weight.

## 2024-08-06 ENCOUNTER — TELEPHONE (OUTPATIENT)
Age: 44
End: 2024-08-06

## 2024-08-06 DIAGNOSIS — E66.9 OBESITY (BMI 30-39.9): Primary | ICD-10-CM

## 2024-08-06 RX ORDER — TIRZEPATIDE 7.5 MG/.5ML
7.5 INJECTION, SOLUTION SUBCUTANEOUS WEEKLY
Qty: 2 ML | Refills: 0 | Status: SHIPPED | OUTPATIENT
Start: 2024-08-06 | End: 2024-09-03

## 2024-08-06 NOTE — TELEPHONE ENCOUNTER
Pt of Dr. AMAYA calling in to request zepbound 7.5mg script to be sent to [facility] pharmacy on file.   Pt states he is currently on the 7.5mg and would have been going to the 10mg dose which had been sent electronically, however, the supply is low and he is unable to find this dosage.   Pt would like to continue on the 7.5mg dose but also would like to leave the 10mg script in place so they can order if for him and he can be on the wait list.     Denies any ill side effects.

## 2024-08-28 ENCOUNTER — CLINICAL SUPPORT (OUTPATIENT)
Dept: BARIATRICS | Facility: CLINIC | Age: 44
End: 2024-08-28

## 2024-08-28 VITALS
HEART RATE: 90 BPM | RESPIRATION RATE: 17 BRPM | HEIGHT: 71 IN | WEIGHT: 228.4 LBS | TEMPERATURE: 98.2 F | SYSTOLIC BLOOD PRESSURE: 124 MMHG | BODY MASS INDEX: 31.98 KG/M2 | DIASTOLIC BLOOD PRESSURE: 82 MMHG

## 2024-08-28 DIAGNOSIS — R63.5 ABNORMAL WEIGHT GAIN: Primary | ICD-10-CM

## 2024-08-28 PROCEDURE — RECHECK

## 2024-08-28 NOTE — PROGRESS NOTES
Patient last visit weight: 241lbs   Patient current visit weight: 228.4lbs     If you are taking phentermine or other oral weight loss medications, are you experiencing any of the following symptoms:  Headache:   Blurred Vision:   Chest Pain:   Palpitations:  Insomnia:   SPECIFY ORAL MEDICATION AND DOSAGE:     If you are taking an injectable medication,  are you experiencing any of the following symptoms:  Bloating: NO   Nausea: NO   Vomiting: NO   Constipation: NO   Diarrhea: NO   SPECIFY INJECTABLE MEDICATION AND CURRENT DOSAGE: Zepbound 7.5mg. Patient requesting to stay at this dose due to not being able to find 10mg anywhere, even calling pharmacies on list provided by office. Requesting to be sent to [facility] in [place].       Vitals:    Is BP less than 100/60? NO   Is BP greater than 140/90? NO   Is HR greater than 100? NO   **If yes to any of the above, have patient relax and repeat in 5-10 minutes**    Repeat values:    Is BP less than 100/60?  Is BP greater than 140/90?  Is HR greater than 100?  **If values remain outside of ranges above, please consult provider for next steps**

## 2024-08-30 DIAGNOSIS — E66.9 OBESITY (BMI 30-39.9): ICD-10-CM

## 2024-08-30 RX ORDER — TIRZEPATIDE 7.5 MG/.5ML
7.5 INJECTION, SOLUTION SUBCUTANEOUS WEEKLY
Qty: 2 ML | Refills: 1 | Status: SHIPPED | OUTPATIENT
Start: 2024-08-30 | End: 2024-10-25

## 2024-10-07 ENCOUNTER — TELEPHONE (OUTPATIENT)
Age: 44
End: 2024-10-07

## 2024-10-07 NOTE — TELEPHONE ENCOUNTER
Patient called the RX Refill Line. Message is being forwarded to the office.     Patient is requesting Zepbound 7.5 and 10 mg  be sent to the [facility] due to he had issues last refill.    Patient is supposed to be on the 10 mg but it was not available so 7.5 was sent in.     Please contact patient directly for any further information

## 2024-10-08 DIAGNOSIS — E66.9 OBESITY (BMI 30-39.9): Primary | ICD-10-CM

## 2024-10-08 RX ORDER — TIRZEPATIDE 10 MG/.5ML
10 INJECTION, SOLUTION SUBCUTANEOUS WEEKLY
Qty: 2 ML | Refills: 2 | Status: SHIPPED | OUTPATIENT
Start: 2024-10-08 | End: 2024-12-03

## 2024-11-12 ENCOUNTER — TELEPHONE (OUTPATIENT)
Dept: BARIATRICS | Facility: CLINIC | Age: 44
End: 2024-11-12

## 2024-11-12 NOTE — TELEPHONE ENCOUNTER
Pt is calling to advise that he has 2 more injections and is needing a new script sent over to the [facility] in [place]. Pt is also requesting an increase in the dosage. Please contact pt

## 2024-11-15 DIAGNOSIS — E66.9 OBESITY (BMI 30-39.9): ICD-10-CM

## 2024-11-15 RX ORDER — TIRZEPATIDE 10 MG/.5ML
10 INJECTION, SOLUTION SUBCUTANEOUS WEEKLY
Qty: 2 ML | Refills: 2 | Status: SHIPPED | OUTPATIENT
Start: 2024-11-15 | End: 2025-01-10

## 2024-12-17 ENCOUNTER — TELEPHONE (OUTPATIENT)
Dept: BARIATRICS | Facility: CLINIC | Age: 44
End: 2024-12-17

## 2024-12-17 NOTE — TELEPHONE ENCOUNTER
Patient called requesting refill for     tirzepatide (Zepbound) 10 mg/0.5 mL auto-injector   . Patient made aware medication was refilled on 11.15.2024 for 2 mls with 2 refills to [facility] pharmacy. Patient instructed to contact the pharmacy to obtain refills of medication. Patient verbalized understanding.

## 2024-12-18 ENCOUNTER — TELEPHONE (OUTPATIENT)
Dept: BARIATRICS | Facility: CLINIC | Age: 44
End: 2024-12-18

## 2024-12-18 NOTE — TELEPHONE ENCOUNTER
PA for Zepbound 10mg SUBMITTED to [facility]     via    [x]CMM-KEY: [id number]  []Surescripts-Case ID #   []Availity-Auth ID # NDC #   []Faxed to plan   []Other website   []Phone call Case ID #     []PA sent as URGENT    All office notes, labs and other pertaining documents and studies sent. Clinical questions answered. Awaiting determination from insurance company.     Turnaround time for your insurance to make a decision on your Prior Authorization can take 7-21 business days.

## 2024-12-19 NOTE — TELEPHONE ENCOUNTER
PA for Zepbound 10mg  APPROVED     Date(s) approved 12/18/2024 - 12/18/2025    Case #    Patient advised by          []MyChart Message  [x]Phone call   []LMOM  []L/M to call office as no active Communication consent on file  []Unable to leave detailed message as VM not approved on Communication consent       Pharmacy advised by    [x]Fax  []Phone call    Approval letter scanned into Media Yes

## 2025-01-05 DIAGNOSIS — E78.5 HYPERLIPIDEMIA, UNSPECIFIED HYPERLIPIDEMIA TYPE: ICD-10-CM

## 2025-01-05 DIAGNOSIS — I10 ESSENTIAL HYPERTENSION: ICD-10-CM

## 2025-01-07 RX ORDER — ATORVASTATIN CALCIUM 10 MG/1
10 TABLET, FILM COATED ORAL DAILY
Qty: 90 TABLET | Refills: 1 | Status: SHIPPED | OUTPATIENT
Start: 2025-01-07

## 2025-01-07 RX ORDER — HYDROCHLOROTHIAZIDE 25 MG/1
25 TABLET ORAL DAILY
Qty: 90 TABLET | Refills: 1 | Status: SHIPPED | OUTPATIENT
Start: 2025-01-07

## 2025-01-13 ENCOUNTER — TELEPHONE (OUTPATIENT)
Age: 45
End: 2025-01-13

## 2025-01-13 DIAGNOSIS — Z00.00 HEALTH CARE MAINTENANCE: Primary | ICD-10-CM

## 2025-01-13 NOTE — TELEPHONE ENCOUNTER
Patient contacted the office this morning in regards to lab work to be completed prior to scheduled visit on 02/03/25. Advised there are active lab orders in chart. Patient also wanted to know if he could get his blood type checked. Informed I would forward message to pcp if a lab order could be placed. Please send [facility] message back to patient with an update, thank you.

## 2025-01-16 ENCOUNTER — APPOINTMENT (OUTPATIENT)
Dept: LAB | Facility: CLINIC | Age: 45
End: 2025-01-16
Payer: COMMERCIAL

## 2025-01-16 DIAGNOSIS — I10 PRIMARY HYPERTENSION: ICD-10-CM

## 2025-01-16 DIAGNOSIS — R73.03 PREDIABETES: ICD-10-CM

## 2025-01-16 DIAGNOSIS — Z00.00 HEALTH CARE MAINTENANCE: ICD-10-CM

## 2025-01-16 DIAGNOSIS — E78.2 MIXED HYPERLIPIDEMIA: ICD-10-CM

## 2025-01-16 DIAGNOSIS — E78.6 LOW HDL (UNDER 40): ICD-10-CM

## 2025-01-16 LAB
ABO GROUP BLD: NORMAL
EST. AVERAGE GLUCOSE BLD GHB EST-MCNC: 117 MG/DL
HBA1C MFR BLD: 5.7 %
RH BLD: POSITIVE

## 2025-01-16 PROCEDURE — 86901 BLOOD TYPING SEROLOGIC RH(D): CPT

## 2025-01-16 PROCEDURE — 80061 LIPID PANEL: CPT

## 2025-01-16 PROCEDURE — 83036 HEMOGLOBIN GLYCOSYLATED A1C: CPT

## 2025-01-16 PROCEDURE — 80053 COMPREHEN METABOLIC PANEL: CPT

## 2025-01-16 PROCEDURE — 86900 BLOOD TYPING SEROLOGIC ABO: CPT

## 2025-01-16 PROCEDURE — 36415 COLL VENOUS BLD VENIPUNCTURE: CPT

## 2025-01-17 LAB
ALBUMIN SERPL BCG-MCNC: 4.5 G/DL (ref 3.5–5)
ALP SERPL-CCNC: 44 U/L (ref 34–104)
ALT SERPL W P-5'-P-CCNC: 18 U/L (ref 7–52)
ANION GAP SERPL CALCULATED.3IONS-SCNC: 8 MMOL/L (ref 4–13)
AST SERPL W P-5'-P-CCNC: 19 U/L (ref 13–39)
BILIRUB SERPL-MCNC: 0.46 MG/DL (ref 0.2–1)
BUN SERPL-MCNC: 20 MG/DL (ref 5–25)
CALCIUM SERPL-MCNC: 8.4 MG/DL (ref 8.4–10.2)
CHLORIDE SERPL-SCNC: 98 MMOL/L (ref 96–108)
CHOLEST SERPL-MCNC: 81 MG/DL (ref ?–200)
CO2 SERPL-SCNC: 30 MMOL/L (ref 21–32)
CREAT SERPL-MCNC: 0.88 MG/DL (ref 0.6–1.3)
GFR SERPL CREATININE-BSD FRML MDRD: 105 ML/MIN/1.73SQ M
GLUCOSE P FAST SERPL-MCNC: 89 MG/DL (ref 65–99)
HDLC SERPL-MCNC: 35 MG/DL
LDLC SERPL CALC-MCNC: 28 MG/DL (ref 0–100)
POTASSIUM SERPL-SCNC: 4.1 MMOL/L (ref 3.5–5.3)
PROT SERPL-MCNC: 8.1 G/DL (ref 6.4–8.4)
SODIUM SERPL-SCNC: 136 MMOL/L (ref 135–147)
TRIGL SERPL-MCNC: 89 MG/DL (ref ?–150)

## 2025-01-20 RX ORDER — ALBUTEROL SULFATE 90 UG/1
INHALANT RESPIRATORY (INHALATION)
Refills: 1 | OUTPATIENT
Start: 2025-01-20

## 2025-01-27 ENCOUNTER — NURSE TRIAGE (OUTPATIENT)
Age: 45
End: 2025-01-27

## 2025-01-27 ENCOUNTER — OFFICE VISIT (OUTPATIENT)
Dept: BARIATRICS | Facility: CLINIC | Age: 45
End: 2025-01-27
Payer: COMMERCIAL

## 2025-01-27 VITALS
HEART RATE: 95 BPM | RESPIRATION RATE: 16 BRPM | SYSTOLIC BLOOD PRESSURE: 122 MMHG | WEIGHT: 221 LBS | BODY MASS INDEX: 30.94 KG/M2 | HEIGHT: 71 IN | DIASTOLIC BLOOD PRESSURE: 78 MMHG | TEMPERATURE: 97.7 F

## 2025-01-27 DIAGNOSIS — E66.09 CLASS 1 OBESITY DUE TO EXCESS CALORIES WITH BODY MASS INDEX (BMI) OF 34.0 TO 34.9 IN ADULT, UNSPECIFIED WHETHER SERIOUS COMORBIDITY PRESENT: ICD-10-CM

## 2025-01-27 DIAGNOSIS — E66.811 CLASS 1 OBESITY DUE TO EXCESS CALORIES WITH BODY MASS INDEX (BMI) OF 34.0 TO 34.9 IN ADULT, UNSPECIFIED WHETHER SERIOUS COMORBIDITY PRESENT: ICD-10-CM

## 2025-01-27 DIAGNOSIS — E66.811 CLASS 1 OBESITY DUE TO EXCESS CALORIES WITH BODY MASS INDEX (BMI) OF 34.0 TO 34.9 IN ADULT, UNSPECIFIED WHETHER SERIOUS COMORBIDITY PRESENT: Primary | ICD-10-CM

## 2025-01-27 DIAGNOSIS — E66.09 CLASS 1 OBESITY DUE TO EXCESS CALORIES WITH BODY MASS INDEX (BMI) OF 34.0 TO 34.9 IN ADULT, UNSPECIFIED WHETHER SERIOUS COMORBIDITY PRESENT: Primary | ICD-10-CM

## 2025-01-27 PROCEDURE — 99214 OFFICE O/P EST MOD 30 MIN: CPT | Performed by: STUDENT IN AN ORGANIZED HEALTH CARE EDUCATION/TRAINING PROGRAM

## 2025-01-27 RX ORDER — TIRZEPATIDE 10 MG/.5ML
10 INJECTION, SOLUTION SUBCUTANEOUS WEEKLY
Qty: 2 ML | Refills: 5 | Status: SHIPPED | OUTPATIENT
Start: 2025-01-27 | End: 2025-01-27 | Stop reason: SDUPTHER

## 2025-01-27 RX ORDER — TIRZEPATIDE 10 MG/.5ML
10 INJECTION, SOLUTION SUBCUTANEOUS WEEKLY
Qty: 2 ML | Refills: 5 | Status: SHIPPED | OUTPATIENT
Start: 2025-01-27

## 2025-01-27 RX ORDER — TIRZEPATIDE 10 MG/.5ML
10 INJECTION, SOLUTION SUBCUTANEOUS WEEKLY
COMMUNITY
End: 2025-01-27 | Stop reason: SDUPTHER

## 2025-01-27 NOTE — TELEPHONE ENCOUNTER
"Pt calling in to request zepbound be resent to [facility] on file, resent per protocol.    Answer Assessment - Initial Assessment Questions  1. NAME of MEDICINE: \"What medicine(s) are you calling about?\"      zepbound  2. QUESTION: \"What is your question?\" (e.g., double dose of medicine, side effect)      Should go to [facility]  3. PRESCRIBER: \"Who prescribed the medicine?\" Reason: if prescribed by specialist, call should be referred to that group.      Gregory randall    Protocols used: Medication Question Call-Adult-[place]    "

## 2025-01-27 NOTE — PROGRESS NOTES
Assessment & Plan  Class 1 obesity    Current weight: 221  Previous weight: 264  (12/2023)   Total weight loss: 16%    Continue with Zepbound 10 mg.  Patient's BMI is 30 but he does retain a lot of muscle mass so informed him that it is not an accurate measure of determining his adiposity in relation to comorbidities.  Recommended body scan.  He is eating well with frequent meals and good amount of protein as well as fluids throughout the day.  Would recommend to increase fluids and calorie intake on the days of intense workouts such as jujitsu or any kind of increased activity      Mindful Eating and Drinking is key to promote healthy behaviors that lead to decreasing adiposity which can improve insulin resistance and decrease  comorbidities which are linked to visceral fat. Recommend practicing with frequent meals/snacks (3 meals 2-3 snacks daily) as it can potentially improve metabolism and allow for better portion control by decreasing Ghrelin (hunger hormone).  Recommend paying attention to why you are hungry and ask yourself if you had enough fluids (water, flavored water, tea, coffee) and food throughout the day. Pay attention to environmental and behavioral triggers that can also stimulate appetite     Fluid intake which is at least half your body weight in ounces is necessary to help control cravings (decreasing confusion for appetite vs water deprivation) as the human body is made up of 50-70% of Fluids. If there is a diversion for water alone, would recommend flavored water (example-splash of lemonade or ice tea)  to help promote compliance    Protein is necessary to promote satiety, metabolism, and muscle growth/repair.      Carbohydrates are essential as it is the body's main fuel source for daily activities.  Recommend that carbohydrates be consumed mostly during the times you are more active and awake.  High amount of carbohydrates prior to bedtime can be stored as fat as energy/fuel is not needed  while asleep.     Fats: Essential vitamins like A, D, and E, protect your organs, support cell growth, and contribute to maintaining healthy cholesterol levels     Metabolism will slow down if meals are skipped.  Metabolism can also be promoted by nutrition, meal frequency and increased activity levels (including steps at work, exercise, walking)     Daily Calorie Needs: are individualized and will need to take into account any fluid losses, increased activity levels which may need to be adjusted daily. Recommended to not skip any meals even if there is a lack of appetite as it can be suppressed by caffeine or any prior heavy meal due to Leptin (satiety hormone).  Calorie and Fluid intake will need to be increased if there is any increased activity during the day compared to previous days.  With proper fluid and macronutrient intake throughout the day, portion control and decreased cravings will come naturally    Weight check: BMI and weight serve as only guidelines as it is not factor in muscle mass, fat, water. Recommended not to check your weight daily as it can fluctuate depending on fluid and p.o. intake. A decrease in adiposity can be measured by how your clothes fit       Return in about 18 weeks (around 6/2/2025) for followup .       Most recent notes, labs and records were reviewed.      ______________________________________________________________________        Subjective:     Chief Complaint   Patient presents with    Follow-up     MWM F/u; Waist 35.5in       HPI: Andrews Oliver  is a 44 y.o. male with  hyperlipidemia, hypertension, sleep apnea, class I obesity, prediabetes last A1c 5.7 presents to clinic for follow-up.  He is currently on Zepbound 10 mg and is doing well.      Dietary Regimen:  Breakfast: Cherrios 2 percent milk, yogurt , orgain            Lunch: orgain protein drink   Dinner:low fat high protein mixed green salads, carbs in the form vegetables, cesear salad  Fluids: two->  78 ounces  "water    Physical Activity:    Frequency: Works out 5 times a week, incorporates familia    Review Of Systems:  Constitutional:  Negative for diaphoresis  Gastrointestinal:  Negative for abdominal pain  Skin:  Negative for pallor.   Psychiatric/Behavioral:  Negative for agitation, behavioral problems, confusion, dysphoric mood and hallucinations.    All other systems reviewed and are negative.     Objective:  /78 (BP Location: Left arm, Patient Position: Sitting)   Pulse 95   Temp 97.7 °F (36.5 °C) (Tympanic)   Resp 16   Ht 5' 11\" (1.803 m)   Wt 100 kg (221 lb)   BMI 30.82 kg/m²     Wt Readings from Last 30 Encounters:   01/27/25 100 kg (221 lb)   08/28/24 104 kg (228 lb 6.4 oz)   08/01/24 105 kg (232 lb)   07/12/24 110 kg (241 lb 9.6 oz)   06/07/24 114 kg (252 lb 3.2 oz)   04/15/24 116 kg (255 lb 9.6 oz)   03/06/24 121 kg (266 lb 6.4 oz)   02/26/24 124 kg (274 lb)   01/25/24 123 kg (272 lb)   12/11/23 120 kg (264 lb 9.6 oz)   07/17/23 118 kg (260 lb)   03/10/23 118 kg (261 lb)   01/16/23 119 kg (261 lb 12.8 oz)   01/12/23 121 kg (267 lb)   01/06/23 116 kg (255 lb)   12/22/22 117 kg (259 lb)   12/08/22 117 kg (259 lb)   11/29/22 117 kg (259 lb)   09/20/22 113 kg (250 lb)   09/02/22 113 kg (250 lb)   07/08/22 112 kg (248 lb)   02/11/22 113 kg (250 lb)   11/19/21 111 kg (244 lb 6.4 oz)   10/29/21 108 kg (238 lb 6.4 oz)   09/17/21 117 kg (259 lb)   08/20/21 109 kg (241 lb)   07/28/21 110 kg (241 lb 9.6 oz)   07/23/21 117 kg (259 lb)   06/30/21 114 kg (252 lb)   05/25/21 117 kg (259 lb)       Physical Exam  Constitutional:       General: No acute distress. AAOx3  HENT:      Head: Normocephalic and atraumatic.   Eyes:      Extraocular Movements: Extraocular movements intact.      Conjunctiva/sclera: Conjunctivae normal.      Pupils: Pupils are equal, round, and reactive to light.   Pulmonary:      Effort: Pulmonary effort is normal. No evidence of labored breathing   Neurological:      General: No focal " deficit present. Cranial Nerves grossly intact      Mental Status: Mental status is at baseline.   Psychiatric:         Mood and Affect: Mood normal.         Behavior: Behavior normal.     Labs and Imaging  Recent labs and imaging have been personally reviewed.

## 2025-02-10 ENCOUNTER — OFFICE VISIT (OUTPATIENT)
Dept: FAMILY MEDICINE CLINIC | Facility: CLINIC | Age: 45
End: 2025-02-10
Payer: COMMERCIAL

## 2025-02-10 VITALS
HEIGHT: 71 IN | WEIGHT: 225 LBS | SYSTOLIC BLOOD PRESSURE: 124 MMHG | DIASTOLIC BLOOD PRESSURE: 80 MMHG | TEMPERATURE: 97.9 F | OXYGEN SATURATION: 95 % | BODY MASS INDEX: 31.5 KG/M2 | RESPIRATION RATE: 16 BRPM | HEART RATE: 85 BPM

## 2025-02-10 DIAGNOSIS — E78.2 MIXED HYPERLIPIDEMIA: ICD-10-CM

## 2025-02-10 DIAGNOSIS — E66.9 OBESITY (BMI 30-39.9): ICD-10-CM

## 2025-02-10 DIAGNOSIS — Z00.00 HEALTH CARE MAINTENANCE: ICD-10-CM

## 2025-02-10 DIAGNOSIS — E78.1 HYPERTRIGLYCERIDEMIA: Primary | ICD-10-CM

## 2025-02-10 DIAGNOSIS — E78.6 LOW HDL (UNDER 40): ICD-10-CM

## 2025-02-10 DIAGNOSIS — R73.03 PREDIABETES: ICD-10-CM

## 2025-02-10 DIAGNOSIS — I10 PRIMARY HYPERTENSION: ICD-10-CM

## 2025-02-10 PROCEDURE — 99214 OFFICE O/P EST MOD 30 MIN: CPT | Performed by: FAMILY MEDICINE

## 2025-02-10 NOTE — ASSESSMENT & PLAN NOTE
On atorvastatin  Orders:    Comprehensive metabolic panel; Future    Lipid panel; Future    CBC and differential; Future

## 2025-02-10 NOTE — PATIENT INSTRUCTIONS
Here for recheck and rec low sugar diet and hga1c at 5.7. Rec taking all meds as directed to get BMI lower than 25 and also take cholesterol med and BP med as directed. Exercise to increase hdl. Stop cholesterol med and BP med and recheck in 6 months with labs. Patient has not had to use Zepbound since December 2024.

## 2025-02-10 NOTE — ASSESSMENT & PLAN NOTE
{Lose weight as directed to get BMI lower than 25    Orders:    Comprehensive metabolic panel; Future    Lipid panel; Future    Hemoglobin A1C; Future    CBC and differential; Future

## 2025-02-10 NOTE — ASSESSMENT & PLAN NOTE
Stable on bp med  Orders:    Comprehensive metabolic panel; Future    CBC and differential; Future

## 2025-02-10 NOTE — PROGRESS NOTES
Name: Andrews Oliver      : 1980      MRN: [id number]  Encounter Provider: Cheng Hudson DO  Encounter Date: 2/10/2025   Encounter department: [facility]  :  Chief Complaint   Patient presents with    Blood Pressure Check     Patient Instructions   Here for recheck and rec low sugar diet and hga1c at 5.7. Rec taking all meds as directed to get BMI lower than 25 and also take cholesterol med and BP med as directed. Exercise to increase hdl. Stop cholesterol med and BP med and recheck in 6 months with labs. Patient has not had to use Zepbound since 2024.     Assessment & Plan  Hypertriglyceridemia  On atorvastatin  Orders:    Comprehensive metabolic panel; Future    Lipid panel; Future    CBC and differential; Future    Prediabetes  Low sugar diet encouraged, hga1c at 5.7  Orders:    Comprehensive metabolic panel; Future    Hemoglobin A1C; Future    CBC and differential; Future    Primary hypertension  Stable on bp med  Orders:    Comprehensive metabolic panel; Future    CBC and differential; Future    Mixed hyperlipidemia  On atorvastatin and stable with weight  Orders:    Comprehensive metabolic panel; Future    Lipid panel; Future    CBC and differential; Future    Obesity (BMI 30-39.9)  {Lose weight as directed to get BMI lower than 25    Orders:    Comprehensive metabolic panel; Future    Lipid panel; Future    Hemoglobin A1C; Future    CBC and differential; Future    Low HDL (under 40)  Exercise to increase hdl.   Orders:    Comprehensive metabolic panel; Future    Lipid panel; Future    CBC and differential; Future    Health care maintenance    Orders:    CBC and differential; Future          Depression Screening and Follow-up Plan: Patient was screened for depression during today's encounter. They screened negative with a PHQ-2 score of 0.      History of Present Illness   Here for recheck a nd had labs done showing hga1c at 5.7 and lost weight with GLP-1 and  "lipids stable and needs to exercise to increase hdl. Trigs better and is on atorvastatin. Monitor BP and weight and hdl. Sees weight loss counselor. BP stable on med.       Review of Systems   Constitutional: Negative.    HENT: Negative.     Eyes: Negative.    Respiratory: Negative.     Cardiovascular: Negative.    Gastrointestinal: Negative.    Endocrine: Negative.    Genitourinary: Negative.    Musculoskeletal: Negative.    Skin: Negative.    Allergic/Immunologic: Negative.    Neurological: Negative.    Hematological: Negative.    Psychiatric/Behavioral: Negative.         Objective   /80   Pulse 85   Temp 97.9 °F (36.6 °C) (Temporal)   Resp 16   Ht 5' 11\" (1.803 m)   Wt 102 kg (225 lb)   SpO2 95%   BMI 31.38 kg/m²      Physical Exam  Constitutional:       Appearance: He is well-developed. He is obese.   HENT:      Head: Normocephalic and atraumatic.      Right Ear: External ear normal.      Left Ear: External ear normal.      Nose: Nose normal.      Mouth/Throat:      Mouth: Mucous membranes are moist.   Eyes:      Conjunctiva/sclera: Conjunctivae normal.      Pupils: Pupils are equal, round, and reactive to light.   Cardiovascular:      Rate and Rhythm: Normal rate and regular rhythm.      Pulses: Normal pulses.      Heart sounds: Normal heart sounds.   Pulmonary:      Effort: Pulmonary effort is normal.      Breath sounds: Normal breath sounds.   Musculoskeletal:         General: Normal range of motion.      Cervical back: Normal range of motion and neck supple.   Skin:     General: Skin is warm and dry.      Capillary Refill: Capillary refill takes less than 2 seconds.   Neurological:      General: No focal deficit present.      Mental Status: He is alert and oriented to person, place, and time. Mental status is at baseline.      Deep Tendon Reflexes: Reflexes are normal and symmetric.   Psychiatric:         Mood and Affect: Mood normal.         Behavior: Behavior normal.         Thought Content: " Thought content normal.         Judgment: Judgment normal.       Administrative Statements   I have spent a total time of 30 minutes in caring for this patient on the day of the visit/encounter including Diagnostic results, Prognosis, Risks and benefits of tx options, Instructions for management, Patient and family education, Importance of tx compliance, Risk factor reductions, Impressions, Counseling / Coordination of care, Documenting in the medical record, Reviewing / ordering tests, medicine, procedures  , and Obtaining or reviewing history  .

## 2025-02-10 NOTE — ASSESSMENT & PLAN NOTE
On atorvastatin and stable with weight  Orders:    Comprehensive metabolic panel; Future    Lipid panel; Future    CBC and differential; Future

## 2025-02-10 NOTE — ASSESSMENT & PLAN NOTE
Exercise to increase hdl.   Orders:    Comprehensive metabolic panel; Future    Lipid panel; Future    CBC and differential; Future

## 2025-02-10 NOTE — ASSESSMENT & PLAN NOTE
Low sugar diet encouraged, hga1c at 5.7  Orders:    Comprehensive metabolic panel; Future    Hemoglobin A1C; Future    CBC and differential; Future

## 2025-03-28 RX ORDER — DOXYCYCLINE HYCLATE 100 MG
TABLET ORAL
Qty: 30 TABLET | Refills: 2 | OUTPATIENT
Start: 2025-03-28

## 2025-06-12 ENCOUNTER — OFFICE VISIT (OUTPATIENT)
Dept: CARDIOLOGY CLINIC | Facility: CLINIC | Age: 45
End: 2025-06-12
Payer: COMMERCIAL

## 2025-06-12 VITALS
WEIGHT: 225.8 LBS | HEART RATE: 86 BPM | DIASTOLIC BLOOD PRESSURE: 88 MMHG | BODY MASS INDEX: 31.61 KG/M2 | SYSTOLIC BLOOD PRESSURE: 120 MMHG | HEIGHT: 71 IN | OXYGEN SATURATION: 96 %

## 2025-06-12 DIAGNOSIS — I10 PRIMARY HYPERTENSION: Primary | ICD-10-CM

## 2025-06-12 DIAGNOSIS — I77.810 MILD DILATION OF ASCENDING AORTA (HCC): ICD-10-CM

## 2025-06-12 DIAGNOSIS — E78.2 MIXED HYPERLIPIDEMIA: ICD-10-CM

## 2025-06-12 DIAGNOSIS — G47.33 OSA (OBSTRUCTIVE SLEEP APNEA): ICD-10-CM

## 2025-06-12 PROCEDURE — 99214 OFFICE O/P EST MOD 30 MIN: CPT | Performed by: INTERNAL MEDICINE

## 2025-06-12 NOTE — PROGRESS NOTES
Andrews Oliver  1980  [id number]  [facility]  [address]  [address]  [facility] [address] [phone]    1. Primary hypertension  POCT ECG    Echo complete w/ contrast if indicated      2. Mild dilation of ascending aorta (HCC)  Echo complete w/ contrast if indicated      3. SAVANNA (obstructive sleep apnea)        4. Mixed hyperlipidemia            Discussion/Summary: Overall he is doing well with an excellent functional capacity and no symptoms.  He lost about 30 pounds significantly dropped his cholesterol and is off statin.  Sleep study showed moderate apnea with hypoxemia he is trying to get his weight down to 210 and then we will repeat the sleep study to see if he still has any apnea.  LDL is 28 I explained to him I do not want this to go any lower as he needs cholesterol for hormone synthesis.  Will watch diet closely.  Check echocardiogram to follow-up on upper normal ascending aortic dimensions.  See him back yearly.      Interval History:  42-year-old gentleman with a history of dyslipidemia and family history of premature coronary disease presents for new patient consultation on behalf of Dr. Hudson.  He is here today to talk about cardiovascular risk.  His brother recently had open heart surgery and bypass.  Functionally he has been doing great he is very physically active and has lost some weight over the past 6 months.  He has been trying to improve his dietary intake of cholesterol and also lower his blood pressure.  There has been no exertional chest pain, shortness of breath, palpitations, lightheadedness, dizziness, or syncope.  There has been no lower extremity edema, PND, orthopnea.  He is a lifelong nonsmoker.  Alcohol intake is minimal.  Caffeine intake is minimal.    Overall he feels well.  He remains quite active denies any chest pain, shortness of breath, palpitations, lightheadedness, dizziness, or syncope.  Has been lower extreme edema,  PND, orthopnea.  He has significantly changed his diet and lifestyle lost 30 pounds.      Medical Problems                 Problem List       Esophageal dysphagia    Special screening examination for unspecified viral disease    Left ACL tear    Post-traumatic arthritis of right ankle    Abnormal ECG    Primary hypertension    Mixed hyperlipidemia                  Past Medical History:   Diagnosis Date    Concussion     unsure of LOC- skiing accident 2/21/21    Wears contact lenses      Social History     Socioeconomic History    Marital status: /Civil Union     Spouse name: Not on file    Number of children: Not on file    Years of education: Not on file    Highest education level: Not on file   Occupational History    Not on file   Tobacco Use    Smoking status: Never    Smokeless tobacco: Never   Vaping Use    Vaping status: Never Used   Substance and Sexual Activity    Alcohol use: Not Currently     Comment: socially    Drug use: Never    Sexual activity: Yes     Partners: Female   Other Topics Concern    Not on file   Social History Narrative    Not on file     Social Drivers of Health     Financial Resource Strain: Not on file   Food Insecurity: Not on file   Transportation Needs: Not on file   Physical Activity: Not on file   Stress: Not on file   Social Connections: Unknown (6/18/2024)    Received from [facility]    Social Connections     How often do you feel lonely or isolated from those around you? (Adult - for ages 18 years and over): Not on file   Intimate Partner Violence: Not on file   Housing Stability: Not on file      Family History   Problem Relation Name Age of Onset    Hypertension Mother      Hypertension Father      Heart attack Maternal Uncle       Past Surgical History:   Procedure Laterality Date    EGD      KNEE ARTHROSCOPY W/ ACL RECONSTRUCTION  03/2021    ORIF TIBIA & FIBULA FRACTURES Right     right ankle    MA ARTHRS AIDED ANT CRUCIATE LIGM RPR/[id number]/RCNSTJ Left 3/8/2021     "Procedure: ARTHROSCOPIC RECONSTRUCTION ANTERIOR CRUCIATE LIGAMENT (ACL) WITH AUTOGRAFT (patellar tendon); MENISCUS REPAIR;  Surgeon: Pratik Pozo MD;  Location: [facility];  Service: Orthopedics    TIBIA FRACTURE SURGERY         Current Outpatient Medications:     Ascorbic Acid (Vitamin C) 500 MG CAPS, Take 1 capsule by mouth in the morning., Disp: , Rfl:     Omega-3 Fatty Acids (OMEGA 3 PO), Take 1 capsule by mouth in the morning, Disp: , Rfl:     QC Zinc 50 MG TABS, Take by mouth, Disp: , Rfl:     VITAMIN D, CHOLECALCIFEROL, PO, Take by mouth, Disp: , Rfl:     atorvastatin (LIPITOR) 10 mg tablet, TAKE 1 TABLET BY MOUTH EVERY DAY, Disp: 90 tablet, Rfl: 1    ciprofloxacin-dexamethasone (CIPRODEX) otic suspension, INSTILL 4 DROPS IN EACH AFFECTED EAR FOR 7 DAYS, Disp: , Rfl:     hydroCHLOROthiazide 25 mg tablet, TAKE 1 TABLET (25 MG TOTAL) BY MOUTH DAILY., Disp: 90 tablet, Rfl: 1    tirzepatide (Zepbound) 10 mg/0.5 mL auto-injector, Inject 0.5 mL (10 mg total) under the skin once a week (Patient not taking: Reported on 6/12/2025), Disp: 2 mL, Rfl: 5  No Known Allergies    Labs:     Chemistry        Component Value Date/Time    K 4.1 01/16/2025 0748    CL 98 01/16/2025 0748    CO2 30 01/16/2025 0748    BUN 20 01/16/2025 0748    CREATININE 0.88 01/16/2025 0748        Component Value Date/Time    CALCIUM 8.4 01/16/2025 0748    ALKPHOS 44 01/16/2025 0748    AST 19 01/16/2025 0748    ALT 18 01/16/2025 0748            No results found for: \"CHOL\"  Lab Results   Component Value Date    HDL 35 (L) 01/16/2025    HDL 37 (L) 07/18/2024    HDL 35 (L) 01/22/2024     Lab Results   Component Value Date    LDLCALC 28 01/16/2025    LDLCALC 49 07/18/2024    LDLCALC 58 01/22/2024     Lab Results   Component Value Date    TRIG 89 01/16/2025    TRIG 87 07/18/2024    TRIG 205 (H) 01/22/2024     No results found for: \"CHOLHDL\"    Imaging: No results found.    ECG:  Normal sinus rhythm inferior Q-waves      Review of Systems " "  Constitutional: Negative.   HENT: Negative.     Eyes: Negative.    Cardiovascular: Negative.    Respiratory: Negative.     Endocrine: Negative.    Hematologic/Lymphatic: Negative.    Skin: Negative.    Musculoskeletal: Negative.    Gastrointestinal: Negative.    Genitourinary: Negative.    Neurological: Negative.    Psychiatric/Behavioral: Negative.     All other systems reviewed and are negative.      Vitals:    06/12/25 1559   BP: 120/88   Pulse: 86   SpO2: 96%     Vitals:    06/12/25 1559   Weight: 102 kg (225 lb 12.8 oz)     Height: 5' 11\" (180.3 cm)   Body mass index is 31.49 kg/m².    Physical Exam:  Vital signs reviewed  General:  Alert and cooperative, appears stated age, no acute distress  HEENT:  PERRLA, EOMI, no scleral icterus, no conjunctival pallor  Neck:  No lymphadenopathy, no thyromegaly, no carotid bruits, no elevated JVP  Heart:  Regular rate and rhythm, normal S1/S2, no S3/S4, no murmur, rubs or gallops.  PMI nondisplaced  Lungs:  Clear to auscultation bilaterally, no wheezes rales or rhonchi  Abdomen:  Soft, non-tender, positive bowel sounds, no rebound or guarding,   no organomegaly   Extremities:  Normal range of motion.  No clubbing, cyanosis or edema   Vascular:  2+ pedal pulses  Skin:  No rashes or lesions on exposed skin  Neurologic:  Cranial nerves II-XII grossly intact without focal deficits  Psych:  Normal mood and affect          "

## 2025-06-23 ENCOUNTER — TELEPHONE (OUTPATIENT)
Age: 45
End: 2025-06-23

## 2025-06-23 ENCOUNTER — PATIENT MESSAGE (OUTPATIENT)
Dept: FAMILY MEDICINE CLINIC | Facility: CLINIC | Age: 45
End: 2025-06-23

## 2025-06-23 DIAGNOSIS — G47.33 OSA (OBSTRUCTIVE SLEEP APNEA): ICD-10-CM

## 2025-06-23 DIAGNOSIS — E66.811 CLASS 1 OBESITY DUE TO EXCESS CALORIES WITH BODY MASS INDEX (BMI) OF 34.0 TO 34.9 IN ADULT, UNSPECIFIED WHETHER SERIOUS COMORBIDITY PRESENT: ICD-10-CM

## 2025-06-23 DIAGNOSIS — R73.03 PREDIABETES: Primary | ICD-10-CM

## 2025-06-23 DIAGNOSIS — E66.09 CLASS 1 OBESITY DUE TO EXCESS CALORIES WITH BODY MASS INDEX (BMI) OF 34.0 TO 34.9 IN ADULT, UNSPECIFIED WHETHER SERIOUS COMORBIDITY PRESENT: ICD-10-CM

## 2025-06-23 DIAGNOSIS — E78.2 MIXED HYPERLIPIDEMIA: ICD-10-CM

## 2025-06-23 NOTE — TELEPHONE ENCOUNTER
Patient called regarding chronic knee problem. He needs medical records and would like to discuss his path forward with his knee. OV scheduled.

## 2025-06-25 NOTE — PATIENT COMMUNICATION
Patient calls the office 6/25/25 @3:08 pm to answer the questions regarding his R ankle. Patient last saw Dr. James Lachman of [facility] for his right ankle on August 20, 2021. Patient had joint arthrocentesis R ankle on 5/25/2021. Patient has rescheduled his PCP appointment to 7/2/25.

## 2025-07-02 ENCOUNTER — OFFICE VISIT (OUTPATIENT)
Dept: FAMILY MEDICINE CLINIC | Facility: CLINIC | Age: 45
End: 2025-07-02
Payer: COMMERCIAL

## 2025-07-02 VITALS
OXYGEN SATURATION: 98 % | DIASTOLIC BLOOD PRESSURE: 80 MMHG | TEMPERATURE: 97.6 F | SYSTOLIC BLOOD PRESSURE: 128 MMHG | HEIGHT: 72 IN | BODY MASS INDEX: 29.39 KG/M2 | RESPIRATION RATE: 18 BRPM | WEIGHT: 217 LBS | HEART RATE: 101 BPM

## 2025-07-02 DIAGNOSIS — E66.09 CLASS 1 OBESITY DUE TO EXCESS CALORIES WITH BODY MASS INDEX (BMI) OF 34.0 TO 34.9 IN ADULT, UNSPECIFIED WHETHER SERIOUS COMORBIDITY PRESENT: ICD-10-CM

## 2025-07-02 DIAGNOSIS — M25.571 RIGHT ANKLE PAIN, UNSPECIFIED CHRONICITY: Primary | ICD-10-CM

## 2025-07-02 DIAGNOSIS — R73.03 PREDIABETES: ICD-10-CM

## 2025-07-02 DIAGNOSIS — E66.811 CLASS 1 OBESITY DUE TO EXCESS CALORIES WITH BODY MASS INDEX (BMI) OF 34.0 TO 34.9 IN ADULT, UNSPECIFIED WHETHER SERIOUS COMORBIDITY PRESENT: ICD-10-CM

## 2025-07-02 DIAGNOSIS — E78.6 LOW HDL (UNDER 40): ICD-10-CM

## 2025-07-02 DIAGNOSIS — E66.3 OVERWEIGHT (BMI 25.0-29.9): ICD-10-CM

## 2025-07-02 PROBLEM — E78.1 HYPERTRIGLYCERIDEMIA: Status: RESOLVED | Noted: 2023-07-17 | Resolved: 2025-07-02

## 2025-07-02 PROBLEM — I10 PRIMARY HYPERTENSION: Status: RESOLVED | Noted: 2022-09-02 | Resolved: 2025-07-02

## 2025-07-02 PROBLEM — E78.2 MIXED HYPERLIPIDEMIA: Status: RESOLVED | Noted: 2022-09-02 | Resolved: 2025-07-02

## 2025-07-02 PROCEDURE — 99214 OFFICE O/P EST MOD 30 MIN: CPT | Performed by: FAMILY MEDICINE

## 2025-07-02 NOTE — PROGRESS NOTES
Name: Andrews Oliver      : 1980      MRN: [id number]  Encounter Provider: Cheng Hudson DO  Encounter Date: 2025   Encounter department: [facility] PRIMARY CARE  Chief Complaint   Patient presents with   • Follow-up     F/up for chronic ankle issue.      Patient Instructions   Here for right ankle pain and uses advil prn and refuses ankle fusion or surgery in right ankle. Low sugar diet encouraged and rec handicap placard if interested for chronic right ankle pain. Increase hdl and is losing weight. Strength training and grappling judo and [employer] is helping lose weight as well as swimming. Avoid processed foods.     Assessment & Plan  Right ankle pain, unspecified chronicity  Consult orthopedics. Tylenol prn pain, occasional advil prn pain, rest right ankle   Orders:  •  Ambulatory Referral to Orthopedic Surgery; Future    Low HDL (under 40)  Increase hdl via diet and exercise as tolerated       Prediabetes  Low sugar diet encouraged       Overweight (BMI 25.0-29.9)  Stable and lose weight to get BMI lower than 25.          Class 1 obesity         History of Present Illness     Follow-up (F/up for chronic ankle issue. )      Review of Systems   Constitutional: Negative.    HENT: Negative.     Eyes: Negative.    Respiratory: Negative.     Cardiovascular: Negative.    Gastrointestinal: Negative.    Endocrine: Negative.    Genitourinary: Negative.    Musculoskeletal:         Right ankle pain  - chronic   Skin: Negative.    Allergic/Immunologic: Negative.    Neurological: Negative.    Hematological: Negative.    Psychiatric/Behavioral: Negative.       Past Medical History[1]  Past Surgical History[2]  Family History[3]  Social History[4]  Medications[5]  No Known Allergies  Immunization History   Administered Date(s) Administered   • COVID-19 MODERNA VACC 0.5 ML IM 2021, 2021, 2021   • COVID-19 Moderna Vac BIVALENT 12 Yr+ IM 0.5 ML 2022   • INFLUENZA 2018,  "10/21/2019, 10/25/2020, 11/09/2022   • Influenza, injectable, quadrivalent, preservative free 0.5 mL 10/29/2021   • Influenza, seasonal, injectable 10/25/2020   • Influenza, seasonal, injectable, preservative free 10/21/2019     Objective   /80 (BP Location: Left arm, Patient Position: Sitting, Cuff Size: Standard)   Pulse 101   Temp 97.6 °F (36.4 °C) (Temporal)   Resp 18   Ht 6' 0.44\" (1.84 m)   Wt 98.4 kg (217 lb)   SpO2 98%   BMI 29.07 kg/m²     Physical Exam  Constitutional:       Appearance: He is well-developed.   HENT:      Head: Normocephalic and atraumatic.      Right Ear: External ear normal.      Left Ear: External ear normal.      Nose: Nose normal.     Eyes:      Conjunctiva/sclera: Conjunctivae normal.      Pupils: Pupils are equal, round, and reactive to light.       Cardiovascular:      Rate and Rhythm: Normal rate and regular rhythm.      Pulses: Normal pulses.      Heart sounds: Normal heart sounds.   Pulmonary:      Effort: Pulmonary effort is normal.      Breath sounds: Normal breath sounds.     Musculoskeletal:      Cervical back: Normal range of motion and neck supple.      Comments: Ankle pain right     Skin:     General: Skin is warm and dry.      Capillary Refill: Capillary refill takes less than 2 seconds.     Neurological:      General: No focal deficit present.      Mental Status: He is alert and oriented to person, place, and time. Mental status is at baseline.      Deep Tendon Reflexes: Reflexes are normal and symmetric.     Psychiatric:         Mood and Affect: Mood normal.         Behavior: Behavior normal.         Thought Content: Thought content normal.         Judgment: Judgment normal.       Administrative Statements   I have spent a total time of 30 minutes in caring for this patient on the day of the visit/encounter including Diagnostic results, Prognosis, Risks and benefits of tx options, Instructions for management, Patient and family education, Importance of tx " compliance, Risk factor reductions, Impressions, Counseling / Coordination of care, Documenting in the medical record, Reviewing/placing orders in the medical record (including tests, medications, and/or procedures), and Obtaining or reviewing history  .         [1]  Past Medical History:  Diagnosis Date   • Concussion     unsure of LOC- skiing accident 2/21/21   • Hypertriglyceridemia 07/17/2023   • Mixed hyperlipidemia 09/02/2022   • Primary hypertension 09/02/2022   • Wears contact lenses    [2]  Past Surgical History:  Procedure Laterality Date   • EGD     • KNEE ARTHROSCOPY W/ ACL RECONSTRUCTION  03/2021   • ORIF TIBIA & FIBULA FRACTURES Right     right ankle   • WA ARTHRS AIDED ANT CRUCIATE LIGM RPR/[id number]/RCNSTJ Left 3/8/2021    Procedure: ARTHROSCOPIC RECONSTRUCTION ANTERIOR CRUCIATE LIGAMENT (ACL) WITH AUTOGRAFT (patellar tendon); MENISCUS REPAIR;  Surgeon: Pratik Pozo MD;  [facility];  Service: Orthopedics   • TIBIA FRACTURE SURGERY     [3]  Family History  Problem Relation Name Age of Onset   • Hypertension Mother     • Hypertension Father     • Heart attack Maternal Uncle     [4]  Social History  Tobacco Use   • Smoking status: Never   • Smokeless tobacco: Never   Vaping Use   • Vaping status: Never Used   Substance and Sexual Activity   • Alcohol use: Not Currently     Comment: socially   • Drug use: Never   • Sexual activity: Yes     Partners: Female   [5]  Current Outpatient Medications on File Prior to Visit   Medication Sig   • Ascorbic Acid (Vitamin C) 500 MG CAPS Take 1 capsule by mouth in the morning.   • Omega-3 Fatty Acids (OMEGA 3 PO) Take 1 capsule by mouth in the morning   • QC Zinc 50 MG TABS Take by mouth   • VITAMIN D, CHOLECALCIFEROL, PO Take by mouth   • [DISCONTINUED] Semaglutide-Weight Management (WEGOVY) 0.25 MG/0.5ML Inject 0.5 mL (0.25 mg total) under the skin once a week   • [DISCONTINUED] tirzepatide (Zepbound) 10 mg/0.5 mL auto-injector Inject 0.5 mL (10 mg total)  under the skin once a week   • [DISCONTINUED] atorvastatin (LIPITOR) 10 mg tablet TAKE 1 TABLET BY MOUTH EVERY DAY   • [DISCONTINUED] ciprofloxacin-dexamethasone (CIPRODEX) otic suspension INSTILL 4 DROPS IN EACH AFFECTED EAR FOR 7 DAYS   • [DISCONTINUED] hydroCHLOROthiazide 25 mg tablet TAKE 1 TABLET (25 MG TOTAL) BY MOUTH DAILY.

## 2025-07-02 NOTE — PATIENT INSTRUCTIONS
Here for right ankle pain and uses advil prn and refuses ankle fusion or surgery in right ankle. Low sugar diet encouraged and rec handicap placard if interested for chronic right ankle pain. Increase hdl and is losing weight. Strength training and grappling judo and [employer] is helping lose weight as well as swimming. Avoid processed foods.

## 2025-07-03 ENCOUNTER — TELEPHONE (OUTPATIENT)
Dept: FAMILY MEDICINE CLINIC | Facility: CLINIC | Age: 45
End: 2025-07-03

## 2025-07-03 NOTE — TELEPHONE ENCOUNTER
Patient dropped off a placard form to to filled out by Dr. Hudson. (2 copies just in case)   LOV: 7/3/25

## 2025-07-24 DIAGNOSIS — E78.2 MIXED HYPERLIPIDEMIA: ICD-10-CM

## 2025-07-24 DIAGNOSIS — I10 PRIMARY HYPERTENSION: Primary | ICD-10-CM

## 2025-07-27 RX ORDER — HYDROCHLOROTHIAZIDE 25 MG/1
25 TABLET ORAL DAILY
Qty: 90 TABLET | Refills: 1 | Status: SHIPPED | OUTPATIENT
Start: 2025-07-27

## 2025-07-27 RX ORDER — ATORVASTATIN CALCIUM 10 MG/1
10 TABLET, FILM COATED ORAL DAILY
Qty: 90 TABLET | Refills: 1 | Status: SHIPPED | OUTPATIENT
Start: 2025-07-27

## 2025-08-12 ENCOUNTER — APPOINTMENT (OUTPATIENT)
Dept: LAB | Facility: AMBULARY SURGERY CENTER | Age: 45
End: 2025-08-12
Payer: COMMERCIAL

## 2025-08-14 ENCOUNTER — OFFICE VISIT (OUTPATIENT)
Dept: BARIATRICS | Facility: CLINIC | Age: 45
End: 2025-08-14
Payer: COMMERCIAL

## 2025-08-19 ENCOUNTER — OFFICE VISIT (OUTPATIENT)
Dept: FAMILY MEDICINE CLINIC | Facility: CLINIC | Age: 45
End: 2025-08-19
Payer: COMMERCIAL

## 2025-08-19 VITALS
HEART RATE: 91 BPM | SYSTOLIC BLOOD PRESSURE: 124 MMHG | OXYGEN SATURATION: 99 % | WEIGHT: 220.8 LBS | HEIGHT: 73 IN | TEMPERATURE: 96.7 F | BODY MASS INDEX: 29.26 KG/M2 | DIASTOLIC BLOOD PRESSURE: 82 MMHG

## 2025-08-19 DIAGNOSIS — Z12.11 SCREENING FOR COLON CANCER: ICD-10-CM

## 2025-08-19 DIAGNOSIS — Z00.00 ANNUAL PHYSICAL EXAM: Primary | ICD-10-CM

## 2025-08-19 DIAGNOSIS — Z80.0 FAMILY HISTORY OF COLON CANCER: ICD-10-CM

## 2025-08-19 DIAGNOSIS — E66.3 OVERWEIGHT (BMI 25.0-29.9): ICD-10-CM

## 2025-08-19 PROBLEM — E78.6 LOW HDL (UNDER 40): Status: RESOLVED | Noted: 2025-02-10 | Resolved: 2025-08-19

## 2025-08-19 PROBLEM — R73.03 PREDIABETES: Status: RESOLVED | Noted: 2023-07-17 | Resolved: 2025-08-19

## 2025-08-19 PROBLEM — G47.33 OSA (OBSTRUCTIVE SLEEP APNEA): Status: RESOLVED | Noted: 2024-04-08 | Resolved: 2025-08-19

## 2025-08-19 PROBLEM — E66.9 OBESITY (BMI 30-39.9): Status: RESOLVED | Noted: 2023-07-17 | Resolved: 2025-08-19

## 2025-08-19 PROCEDURE — 99396 PREV VISIT EST AGE 40-64: CPT | Performed by: FAMILY MEDICINE

## (undated) DEVICE — ACE WRAP 6 IN UNSTERILE

## (undated) DEVICE — NEEDLE 18 G X 1 1/2

## (undated) DEVICE — PADDING CAST 6IN COTTON STRL

## (undated) DEVICE — GLOVE SRG BIOGEL 8

## (undated) DEVICE — 4-PORT MANIFOLD: Brand: NEPTUNE 2

## (undated) DEVICE — INTENDED FOR TISSUE SEPARATION, AND OTHER PROCEDURES THAT REQUIRE A SHARP SURGICAL BLADE TO PUNCTURE OR CUT.: Brand: BARD-PARKER ® CARBON RIB-BACK BLADES

## (undated) DEVICE — SUT FIBERWIRE #2 1/2 CIRCLE T-5 38IN AR-7200

## (undated) DEVICE — SPONGE LAP 18 X 18 IN STRL RFD

## (undated) DEVICE — BLADE SHAVER DISSECTOR 4MM 13CM COOLCUT

## (undated) DEVICE — SYRINGE 20ML LL

## (undated) DEVICE — SUT VICRYL 2-0 CT-2 27 IN J269H

## (undated) DEVICE — HEAVY DUTY TABLE COVER: Brand: CONVERTORS

## (undated) DEVICE — PLUMEPEN PRO 10FT

## (undated) DEVICE — GLOVE INDICATOR PI UNDERGLOVE SZ 8.5 BLUE

## (undated) DEVICE — GLOVE SRG BIOGEL 7.5

## (undated) DEVICE — SUT STRATAFIX SPIRAL MONOCRYL PLUS 2-0 CT-1 30CM SXMP1B412

## (undated) DEVICE — SUT MONOCRYL 2-0 SH 27 IN Y417H

## (undated) DEVICE — REAMER 10MM LOPRFL

## (undated) DEVICE — KNOT PUSHER/SUTURE CUTTER W/ PORTAL SKID BUNDLE

## (undated) DEVICE — 3M™ STERI-DRAPE™ U-DRAPE 1015: Brand: STERI-DRAPE™

## (undated) DEVICE — BETHLEHEM UNIVERSAL  ARTHRO PK: Brand: CARDINAL HEALTH

## (undated) DEVICE — SCD SEQUENTIAL COMPRESSION COMFORT SLEEVE MEDIUM KNEE LENGTH: Brand: KENDALL SCD

## (undated) DEVICE — TUBING EXTENSION 6 FT CONTIN WAVE

## (undated) DEVICE — TIBURON SPLIT SHEET: Brand: CONVERTORS

## (undated) DEVICE — MAYO STAND COVER: Brand: CONVERTORS

## (undated) DEVICE — DRESSING MEPILEX AG BORDER 4 X 8 IN

## (undated) DEVICE — VAPR COOLPULSE 90 ELECTRODE 90 DEGREES SUCTION WITH INTEGRATED HANDPIECE: Brand: VAPR COOLPULSE

## (undated) DEVICE — BLADE SAGITTAL 25.6 X 9.5MM

## (undated) DEVICE — LIGHT GLOVE GREEN

## (undated) DEVICE — IMPERVIOUS STOCKINETTE: Brand: DEROYAL

## (undated) DEVICE — GAUZE SPONGES,USP TYPE VII GAUZE, 12 PLY: Brand: CURITY

## (undated) DEVICE — ABDOMINAL PAD: Brand: DERMACEA

## (undated) DEVICE — CYSTO TUBING TUR Y IRRIGATION

## (undated) DEVICE — 3M™ IOBAN™ 2 ANTIMICROBIAL INCISE DRAPE 6650EZ: Brand: IOBAN™ 2

## (undated) DEVICE — CHLORAPREP HI-LITE 26ML ORANGE

## (undated) DEVICE — ADHESIVE SKIN HIGH VISCOSITY EXOFIN 1ML

## (undated) DEVICE — COBAN 6 IN STERILE

## (undated) DEVICE — PUDDLE VAC

## (undated) DEVICE — TUBING SUCTION 5MM X 12 FT

## (undated) DEVICE — BLADE SHAVER DISSECTOR 5MM 13CM COOLCUT

## (undated) DEVICE — 10FR FRAZIER SUCTION HANDLE: Brand: CARDINAL HEALTH